# Patient Record
Sex: FEMALE | Race: BLACK OR AFRICAN AMERICAN | NOT HISPANIC OR LATINO
[De-identification: names, ages, dates, MRNs, and addresses within clinical notes are randomized per-mention and may not be internally consistent; named-entity substitution may affect disease eponyms.]

---

## 2017-01-03 ENCOUNTER — CLINICAL ADVICE (OUTPATIENT)
Age: 67
End: 2017-01-03

## 2017-04-20 ENCOUNTER — MEDICATION RENEWAL (OUTPATIENT)
Age: 67
End: 2017-04-20

## 2017-05-24 ENCOUNTER — APPOINTMENT (OUTPATIENT)
Dept: INTERNAL MEDICINE | Facility: CLINIC | Age: 67
End: 2017-05-24

## 2017-05-24 VITALS
SYSTOLIC BLOOD PRESSURE: 150 MMHG | DIASTOLIC BLOOD PRESSURE: 70 MMHG | OXYGEN SATURATION: 97 % | TEMPERATURE: 98.7 F | HEART RATE: 82 BPM | WEIGHT: 138 LBS | BODY MASS INDEX: 22.27 KG/M2

## 2017-06-19 ENCOUNTER — APPOINTMENT (OUTPATIENT)
Dept: INTERNAL MEDICINE | Facility: CLINIC | Age: 67
End: 2017-06-19

## 2017-06-19 VITALS
BODY MASS INDEX: 22.44 KG/M2 | WEIGHT: 139 LBS | SYSTOLIC BLOOD PRESSURE: 122 MMHG | DIASTOLIC BLOOD PRESSURE: 60 MMHG | TEMPERATURE: 99.5 F | OXYGEN SATURATION: 99 %

## 2017-06-20 LAB
ANION GAP SERPL CALC-SCNC: 14 MMOL/L
BUN SERPL-MCNC: 16 MG/DL
CALCIUM SERPL-MCNC: 9.7 MG/DL
CHLORIDE SERPL-SCNC: 105 MMOL/L
CO2 SERPL-SCNC: 23 MMOL/L
CREAT SERPL-MCNC: 1.12 MG/DL
GLUCOSE SERPL-MCNC: 97 MG/DL
POTASSIUM SERPL-SCNC: 4.9 MMOL/L
SODIUM SERPL-SCNC: 142 MMOL/L

## 2017-06-27 ENCOUNTER — OUTPATIENT (OUTPATIENT)
Dept: OUTPATIENT SERVICES | Facility: HOSPITAL | Age: 67
LOS: 1 days | End: 2017-06-27
Payer: MEDICARE

## 2017-06-27 DIAGNOSIS — D64.9 ANEMIA, UNSPECIFIED: ICD-10-CM

## 2017-06-27 DIAGNOSIS — Z51.89 ENCOUNTER FOR OTHER SPECIFIED AFTERCARE: ICD-10-CM

## 2017-06-27 PROCEDURE — 36430 TRANSFUSION BLD/BLD COMPNT: CPT

## 2017-06-27 PROCEDURE — 86923 COMPATIBILITY TEST ELECTRIC: CPT

## 2017-06-27 PROCEDURE — 86900 BLOOD TYPING SEROLOGIC ABO: CPT

## 2017-06-27 PROCEDURE — 36415 COLL VENOUS BLD VENIPUNCTURE: CPT

## 2017-06-27 PROCEDURE — 86901 BLOOD TYPING SEROLOGIC RH(D): CPT

## 2017-06-27 PROCEDURE — P9016: CPT

## 2017-06-27 PROCEDURE — 86850 RBC ANTIBODY SCREEN: CPT

## 2017-06-27 RX ORDER — ACETAMINOPHEN 500 MG
650 TABLET ORAL ONCE
Qty: 0 | Refills: 0 | Status: DISCONTINUED | OUTPATIENT
Start: 2017-06-27 | End: 2017-07-12

## 2017-06-27 RX ORDER — DIPHENHYDRAMINE HCL 50 MG
25 CAPSULE ORAL ONCE
Qty: 0 | Refills: 0 | Status: DISCONTINUED | OUTPATIENT
Start: 2017-06-27 | End: 2017-07-12

## 2017-08-21 ENCOUNTER — RX RENEWAL (OUTPATIENT)
Age: 67
End: 2017-08-21

## 2017-11-09 ENCOUNTER — APPOINTMENT (OUTPATIENT)
Dept: INTERNAL MEDICINE | Facility: CLINIC | Age: 67
End: 2017-11-09
Payer: MEDICARE

## 2017-11-09 VITALS
DIASTOLIC BLOOD PRESSURE: 71 MMHG | SYSTOLIC BLOOD PRESSURE: 134 MMHG | TEMPERATURE: 98.7 F | HEART RATE: 79 BPM | OXYGEN SATURATION: 99 % | WEIGHT: 134 LBS | BODY MASS INDEX: 21.63 KG/M2

## 2017-11-09 DIAGNOSIS — Z12.11 ENCOUNTER FOR SCREENING FOR MALIGNANT NEOPLASM OF COLON: ICD-10-CM

## 2017-11-09 PROCEDURE — 99213 OFFICE O/P EST LOW 20 MIN: CPT | Mod: GE

## 2017-12-11 ENCOUNTER — FORM ENCOUNTER (OUTPATIENT)
Age: 67
End: 2017-12-11

## 2017-12-12 ENCOUNTER — OUTPATIENT (OUTPATIENT)
Dept: OUTPATIENT SERVICES | Facility: HOSPITAL | Age: 67
LOS: 1 days | End: 2017-12-12
Payer: MEDICARE

## 2017-12-12 DIAGNOSIS — R01.1 CARDIAC MURMUR, UNSPECIFIED: ICD-10-CM

## 2017-12-12 PROCEDURE — 93306 TTE W/DOPPLER COMPLETE: CPT

## 2017-12-12 PROCEDURE — 93306 TTE W/DOPPLER COMPLETE: CPT | Mod: 26

## 2018-01-04 ENCOUNTER — RX RENEWAL (OUTPATIENT)
Age: 68
End: 2018-01-04

## 2018-02-15 ENCOUNTER — RX RENEWAL (OUTPATIENT)
Age: 68
End: 2018-02-15

## 2018-03-05 ENCOUNTER — RX RENEWAL (OUTPATIENT)
Age: 68
End: 2018-03-05

## 2018-03-07 ENCOUNTER — RX RENEWAL (OUTPATIENT)
Age: 68
End: 2018-03-07

## 2018-04-02 ENCOUNTER — RX RENEWAL (OUTPATIENT)
Age: 68
End: 2018-04-02

## 2018-04-02 RX ORDER — AMLODIPINE BESYLATE AND BENAZEPRIL HYDROCHLORIDE 10; 20 MG/1; MG/1
10-20 CAPSULE ORAL
Qty: 30 | Refills: 0 | Status: DISCONTINUED | COMMUNITY
Start: 2017-05-24 | End: 2018-04-02

## 2018-04-09 ENCOUNTER — MEDICATION RENEWAL (OUTPATIENT)
Age: 68
End: 2018-04-09

## 2018-05-01 ENCOUNTER — RX RENEWAL (OUTPATIENT)
Age: 68
End: 2018-05-01

## 2018-09-19 ENCOUNTER — APPOINTMENT (OUTPATIENT)
Dept: INTERNAL MEDICINE | Facility: CLINIC | Age: 68
End: 2018-09-19
Payer: MEDICARE

## 2018-09-19 VITALS
WEIGHT: 135 LBS | HEIGHT: 66 IN | OXYGEN SATURATION: 100 % | DIASTOLIC BLOOD PRESSURE: 74 MMHG | HEART RATE: 77 BPM | SYSTOLIC BLOOD PRESSURE: 155 MMHG | TEMPERATURE: 98.3 F | BODY MASS INDEX: 21.69 KG/M2

## 2018-09-19 DIAGNOSIS — Z23 ENCOUNTER FOR IMMUNIZATION: ICD-10-CM

## 2018-09-19 PROCEDURE — 36415 COLL VENOUS BLD VENIPUNCTURE: CPT

## 2018-09-19 PROCEDURE — 99214 OFFICE O/P EST MOD 30 MIN: CPT | Mod: 25,GC

## 2018-09-21 ENCOUNTER — RESULT REVIEW (OUTPATIENT)
Age: 68
End: 2018-09-21

## 2018-09-21 LAB
ANION GAP SERPL CALC-SCNC: 11 MMOL/L
BUN SERPL-MCNC: 21 MG/DL
CALCIUM SERPL-MCNC: 9.5 MG/DL
CHLORIDE SERPL-SCNC: 105 MMOL/L
CO2 SERPL-SCNC: 24 MMOL/L
CREAT SERPL-MCNC: 1.03 MG/DL
CREAT SPEC-SCNC: 87 MG/DL
GLUCOSE SERPL-MCNC: 81 MG/DL
MICROALBUMIN 24H UR DL<=1MG/L-MCNC: 5.8 MG/DL
MICROALBUMIN/CREAT 24H UR-RTO: 66 MG/G
POTASSIUM SERPL-SCNC: 5.1 MMOL/L
SODIUM SERPL-SCNC: 140 MMOL/L

## 2018-11-06 ENCOUNTER — FORM ENCOUNTER (OUTPATIENT)
Age: 68
End: 2018-11-06

## 2018-11-07 ENCOUNTER — OUTPATIENT (OUTPATIENT)
Dept: OUTPATIENT SERVICES | Facility: HOSPITAL | Age: 68
LOS: 1 days | End: 2018-11-07
Payer: MEDICARE

## 2018-11-07 PROCEDURE — 71048 X-RAY EXAM CHEST 4+ VIEWS: CPT

## 2018-11-07 PROCEDURE — 71048 X-RAY EXAM CHEST 4+ VIEWS: CPT | Mod: 26

## 2018-11-12 ENCOUNTER — OUTPATIENT (OUTPATIENT)
Dept: OUTPATIENT SERVICES | Facility: HOSPITAL | Age: 68
LOS: 1 days | End: 2018-11-12
Payer: MEDICARE

## 2018-11-12 ENCOUNTER — TRANSCRIPTION ENCOUNTER (OUTPATIENT)
Age: 68
End: 2018-11-12

## 2018-11-12 ENCOUNTER — APPOINTMENT (OUTPATIENT)
Dept: RADIOLOGY | Facility: CLINIC | Age: 68
End: 2018-11-12
Payer: MEDICARE

## 2018-11-12 PROCEDURE — 77080 DXA BONE DENSITY AXIAL: CPT | Mod: 26

## 2018-11-12 PROCEDURE — 77080 DXA BONE DENSITY AXIAL: CPT

## 2018-11-15 ENCOUNTER — RESULT REVIEW (OUTPATIENT)
Age: 68
End: 2018-11-15

## 2018-11-19 ENCOUNTER — RX CHANGE (OUTPATIENT)
Age: 68
End: 2018-11-19

## 2018-11-19 RX ORDER — BENAZEPRIL HYDROCHLORIDE 20 MG/1
20 TABLET, FILM COATED ORAL DAILY
Qty: 90 | Refills: 1 | Status: DISCONTINUED | COMMUNITY
Start: 2018-04-02 | End: 2018-11-19

## 2018-11-20 ENCOUNTER — RX RENEWAL (OUTPATIENT)
Age: 68
End: 2018-11-20

## 2018-12-17 ENCOUNTER — LABORATORY RESULT (OUTPATIENT)
Age: 68
End: 2018-12-17

## 2018-12-17 ENCOUNTER — APPOINTMENT (OUTPATIENT)
Dept: INTERNAL MEDICINE | Facility: CLINIC | Age: 68
End: 2018-12-17
Payer: MEDICARE

## 2018-12-17 VITALS
WEIGHT: 136 LBS | OXYGEN SATURATION: 99 % | HEIGHT: 66 IN | HEART RATE: 88 BPM | DIASTOLIC BLOOD PRESSURE: 68 MMHG | TEMPERATURE: 97.6 F | SYSTOLIC BLOOD PRESSURE: 140 MMHG | BODY MASS INDEX: 21.86 KG/M2

## 2018-12-17 PROCEDURE — 36415 COLL VENOUS BLD VENIPUNCTURE: CPT

## 2018-12-17 PROCEDURE — G0439: CPT | Mod: GC

## 2018-12-21 ENCOUNTER — RESULT REVIEW (OUTPATIENT)
Age: 68
End: 2018-12-21

## 2019-01-24 ENCOUNTER — RX RENEWAL (OUTPATIENT)
Age: 69
End: 2019-01-24

## 2019-01-29 ENCOUNTER — RX RENEWAL (OUTPATIENT)
Age: 69
End: 2019-01-29

## 2019-02-04 ENCOUNTER — FORM ENCOUNTER (OUTPATIENT)
Age: 69
End: 2019-02-04

## 2019-02-05 ENCOUNTER — OUTPATIENT (OUTPATIENT)
Dept: OUTPATIENT SERVICES | Facility: HOSPITAL | Age: 69
LOS: 1 days | End: 2019-02-05
Payer: MEDICARE

## 2019-02-05 DIAGNOSIS — I31.3 PERICARDIAL EFFUSION (NONINFLAMMATORY): ICD-10-CM

## 2019-02-05 PROCEDURE — 93306 TTE W/DOPPLER COMPLETE: CPT

## 2019-02-05 PROCEDURE — 93306 TTE W/DOPPLER COMPLETE: CPT | Mod: 26

## 2019-02-11 ENCOUNTER — RESULT REVIEW (OUTPATIENT)
Age: 69
End: 2019-02-11

## 2019-02-11 ENCOUNTER — TRANSCRIPTION ENCOUNTER (OUTPATIENT)
Age: 69
End: 2019-02-11

## 2019-02-27 ENCOUNTER — RX RENEWAL (OUTPATIENT)
Age: 69
End: 2019-02-27

## 2019-03-06 ENCOUNTER — TRANSCRIPTION ENCOUNTER (OUTPATIENT)
Age: 69
End: 2019-03-06

## 2019-03-22 ENCOUNTER — APPOINTMENT (OUTPATIENT)
Dept: CARDIOLOGY | Facility: CLINIC | Age: 69
End: 2019-03-22

## 2019-03-26 ENCOUNTER — RX RENEWAL (OUTPATIENT)
Age: 69
End: 2019-03-26

## 2019-03-27 ENCOUNTER — RX RENEWAL (OUTPATIENT)
Age: 69
End: 2019-03-27

## 2019-03-29 ENCOUNTER — APPOINTMENT (OUTPATIENT)
Dept: INTERNAL MEDICINE | Facility: CLINIC | Age: 69
End: 2019-03-29
Payer: MEDICARE

## 2019-03-29 VITALS
HEART RATE: 84 BPM | WEIGHT: 140 LBS | OXYGEN SATURATION: 98 % | SYSTOLIC BLOOD PRESSURE: 150 MMHG | DIASTOLIC BLOOD PRESSURE: 69 MMHG | BODY MASS INDEX: 22.5 KG/M2 | HEIGHT: 66 IN | TEMPERATURE: 97.6 F

## 2019-03-29 DIAGNOSIS — T75.3XXA MOTION SICKNESS, INITIAL ENCOUNTER: ICD-10-CM

## 2019-03-29 PROCEDURE — 36415 COLL VENOUS BLD VENIPUNCTURE: CPT

## 2019-03-29 PROCEDURE — 90732 PPSV23 VACC 2 YRS+ SUBQ/IM: CPT

## 2019-03-29 PROCEDURE — G0009: CPT

## 2019-03-29 PROCEDURE — 99214 OFFICE O/P EST MOD 30 MIN: CPT | Mod: GC,25

## 2019-04-01 LAB
ANION GAP SERPL CALC-SCNC: 11 MMOL/L
BUN SERPL-MCNC: 21 MG/DL
CALCIUM SERPL-MCNC: 9.5 MG/DL
CHLORIDE SERPL-SCNC: 105 MMOL/L
CO2 SERPL-SCNC: 25 MMOL/L
CREAT SERPL-MCNC: 1.05 MG/DL
GLUCOSE SERPL-MCNC: 87 MG/DL
POTASSIUM SERPL-SCNC: 4.9 MMOL/L
SODIUM SERPL-SCNC: 141 MMOL/L

## 2019-04-01 RX ORDER — LOSARTAN POTASSIUM 50 MG/1
50 TABLET, FILM COATED ORAL
Qty: 90 | Refills: 3 | Status: DISCONTINUED | COMMUNITY
Start: 2018-11-19 | End: 2019-04-01

## 2019-04-23 ENCOUNTER — APPOINTMENT (OUTPATIENT)
Dept: INTERNAL MEDICINE | Facility: CLINIC | Age: 69
End: 2019-04-23
Payer: MEDICARE

## 2019-04-23 VITALS
DIASTOLIC BLOOD PRESSURE: 65 MMHG | HEART RATE: 80 BPM | SYSTOLIC BLOOD PRESSURE: 139 MMHG | WEIGHT: 138.25 LBS | TEMPERATURE: 98.2 F | BODY MASS INDEX: 22.22 KG/M2 | HEIGHT: 66 IN | OXYGEN SATURATION: 99 %

## 2019-04-23 PROCEDURE — 99213 OFFICE O/P EST LOW 20 MIN: CPT | Mod: GC

## 2019-05-03 ENCOUNTER — RX RENEWAL (OUTPATIENT)
Age: 69
End: 2019-05-03

## 2019-07-02 ENCOUNTER — RX RENEWAL (OUTPATIENT)
Age: 69
End: 2019-07-02

## 2019-08-26 ENCOUNTER — RX RENEWAL (OUTPATIENT)
Age: 69
End: 2019-08-26

## 2019-10-03 ENCOUNTER — LABORATORY RESULT (OUTPATIENT)
Age: 69
End: 2019-10-03

## 2019-10-03 ENCOUNTER — RX RENEWAL (OUTPATIENT)
Age: 69
End: 2019-10-03

## 2019-10-03 ENCOUNTER — APPOINTMENT (OUTPATIENT)
Dept: INTERNAL MEDICINE | Facility: CLINIC | Age: 69
End: 2019-10-03
Payer: MEDICARE

## 2019-10-03 ENCOUNTER — MED ADMIN CHARGE (OUTPATIENT)
Age: 69
End: 2019-10-03

## 2019-10-03 VITALS — DIASTOLIC BLOOD PRESSURE: 62 MMHG | SYSTOLIC BLOOD PRESSURE: 130 MMHG

## 2019-10-03 VITALS — DIASTOLIC BLOOD PRESSURE: 62 MMHG | SYSTOLIC BLOOD PRESSURE: 132 MMHG

## 2019-10-03 VITALS
WEIGHT: 144 LBS | BODY MASS INDEX: 23.14 KG/M2 | SYSTOLIC BLOOD PRESSURE: 150 MMHG | DIASTOLIC BLOOD PRESSURE: 67 MMHG | OXYGEN SATURATION: 99 % | HEART RATE: 83 BPM | TEMPERATURE: 98 F | HEIGHT: 66 IN

## 2019-10-03 DIAGNOSIS — Z12.39 ENCOUNTER FOR OTHER SCREENING FOR MALIGNANT NEOPLASM OF BREAST: ICD-10-CM

## 2019-10-03 DIAGNOSIS — Z12.31 ENCOUNTER FOR SCREENING MAMMOGRAM FOR MALIGNANT NEOPLASM OF BREAST: ICD-10-CM

## 2019-10-03 DIAGNOSIS — Z23 ENCOUNTER FOR IMMUNIZATION: ICD-10-CM

## 2019-10-03 DIAGNOSIS — I10 ESSENTIAL (PRIMARY) HYPERTENSION: ICD-10-CM

## 2019-10-03 PROCEDURE — 90662 IIV NO PRSV INCREASED AG IM: CPT

## 2019-10-03 PROCEDURE — 99214 OFFICE O/P EST MOD 30 MIN: CPT | Mod: 25,GC

## 2019-10-03 PROCEDURE — 36415 COLL VENOUS BLD VENIPUNCTURE: CPT

## 2019-10-03 PROCEDURE — G0008: CPT

## 2019-10-03 RX ORDER — SCOPALAMINE 1 MG/3D
1 PATCH, EXTENDED RELEASE TRANSDERMAL
Qty: 4 | Refills: 2 | Status: DISCONTINUED | COMMUNITY
Start: 2017-05-24 | End: 2019-10-03

## 2019-10-14 LAB
25(OH)D3 SERPL-MCNC: 13.1 NG/ML
ALBUMIN MFR SERPL ELPH: 63.6 %
ALBUMIN SERPL ELPH-MCNC: 4.8 G/DL
ALBUMIN SERPL-MCNC: 4.7 G/DL
ALBUMIN/GLOB SERPL: 1.7 RATIO
ALP BLD-CCNC: 94 U/L
ALPHA1 GLOB MFR SERPL ELPH: 4.1 %
ALPHA1 GLOB SERPL ELPH-MCNC: 0.3 G/DL
ALPHA2 GLOB MFR SERPL ELPH: 6.9 %
ALPHA2 GLOB SERPL ELPH-MCNC: 0.5 G/DL
ALT SERPL-CCNC: 21 U/L
ANION GAP SERPL CALC-SCNC: 15 MMOL/L
AST SERPL-CCNC: 22 U/L
B-GLOBULIN MFR SERPL ELPH: 9.4 %
B-GLOBULIN SERPL ELPH-MCNC: 0.7 G/DL
BASOPHILS # BLD AUTO: 0.45 K/UL
BASOPHILS NFR BLD AUTO: 4.2 %
BILIRUB SERPL-MCNC: 0.4 MG/DL
BUN SERPL-MCNC: 21 MG/DL
CALCIUM SERPL-MCNC: 9.7 MG/DL
CHLORIDE SERPL-SCNC: 109 MMOL/L
CHOLEST SERPL-MCNC: 83 MG/DL
CHOLEST/HDLC SERPL: 3.6 RATIO
CO2 SERPL-SCNC: 21 MMOL/L
CREAT SERPL-MCNC: 1.24 MG/DL
EOSINOPHIL # BLD AUTO: 0 K/UL
EOSINOPHIL NFR BLD AUTO: 0 %
GAMMA GLOB FLD ELPH-MCNC: 1.2 G/DL
GAMMA GLOB MFR SERPL ELPH: 16 %
GLUCOSE SERPL-MCNC: 81 MG/DL
HCT VFR BLD CALC: 29 %
HDLC SERPL-MCNC: 23 MG/DL
HGB BLD-MCNC: 8.1 G/DL
IGA 24H UR QL IFE: NORMAL
INTERPRETATION SERPL IEP-IMP: NORMAL
LDLC SERPL CALC-MCNC: 28 MG/DL
LYMPHOCYTES # BLD AUTO: 2.02 K/UL
LYMPHOCYTES NFR BLD AUTO: 19 %
M PROTEIN SPEC IFE-MCNC: NORMAL
MAGNESIUM SERPL-MCNC: 2 MG/DL
MAN DIFF?: NORMAL
MCHC RBC-ENTMCNC: 27.9 GM/DL
MCHC RBC-ENTMCNC: 30.2 PG
MCV RBC AUTO: 108.2 FL
MONOCYTES # BLD AUTO: 0.7 K/UL
MONOCYTES NFR BLD AUTO: 6.6 %
NEUTROPHILS # BLD AUTO: 6.6 K/UL
NEUTROPHILS NFR BLD AUTO: 57 %
PHOSPHATE SERPL-MCNC: 3.5 MG/DL
PLATELET # BLD AUTO: 472 K/UL
POTASSIUM SERPL-SCNC: 4.8 MMOL/L
PROT SERPL-MCNC: 7.2 G/DL
PROT SERPL-MCNC: 7.4 G/DL
PROT SERPL-MCNC: 7.4 G/DL
RBC # BLD: 2.68 M/UL
RBC # FLD: 19.1 %
SODIUM SERPL-SCNC: 145 MMOL/L
TRIGL SERPL-MCNC: 161 MG/DL
WBC # FLD AUTO: 10.65 K/UL

## 2019-12-19 ENCOUNTER — RX RENEWAL (OUTPATIENT)
Age: 69
End: 2019-12-19

## 2019-12-19 ENCOUNTER — APPOINTMENT (OUTPATIENT)
Dept: INTERNAL MEDICINE | Facility: CLINIC | Age: 69
End: 2019-12-19
Payer: MEDICARE

## 2019-12-19 VITALS
BODY MASS INDEX: 22.34 KG/M2 | WEIGHT: 139 LBS | OXYGEN SATURATION: 100 % | TEMPERATURE: 97.8 F | HEART RATE: 95 BPM | DIASTOLIC BLOOD PRESSURE: 63 MMHG | SYSTOLIC BLOOD PRESSURE: 146 MMHG | HEIGHT: 66 IN

## 2019-12-19 VITALS — DIASTOLIC BLOOD PRESSURE: 61 MMHG | SYSTOLIC BLOOD PRESSURE: 134 MMHG

## 2019-12-19 PROCEDURE — G0444 DEPRESSION SCREEN ANNUAL: CPT | Mod: 59

## 2019-12-19 PROCEDURE — 99213 OFFICE O/P EST LOW 20 MIN: CPT | Mod: 25,GC

## 2019-12-19 PROCEDURE — G0439: CPT

## 2019-12-20 LAB
CREAT SPEC-SCNC: 79 MG/DL
MICROALBUMIN 24H UR DL<=1MG/L-MCNC: 8.7 MG/DL
MICROALBUMIN/CREAT 24H UR-RTO: 110 MG/G

## 2019-12-23 ENCOUNTER — OUTPATIENT (OUTPATIENT)
Dept: OUTPATIENT SERVICES | Facility: HOSPITAL | Age: 69
LOS: 1 days | End: 2019-12-23
Payer: MEDICARE

## 2019-12-23 ENCOUNTER — APPOINTMENT (OUTPATIENT)
Age: 69
End: 2019-12-23

## 2019-12-23 VITALS
RESPIRATION RATE: 18 BRPM | TEMPERATURE: 98 F | SYSTOLIC BLOOD PRESSURE: 136 MMHG | HEART RATE: 87 BPM | OXYGEN SATURATION: 98 % | DIASTOLIC BLOOD PRESSURE: 74 MMHG

## 2019-12-23 VITALS
OXYGEN SATURATION: 98 % | TEMPERATURE: 98 F | RESPIRATION RATE: 18 BRPM | DIASTOLIC BLOOD PRESSURE: 70 MMHG | HEART RATE: 83 BPM | SYSTOLIC BLOOD PRESSURE: 137 MMHG

## 2019-12-23 DIAGNOSIS — D64.9 ANEMIA, UNSPECIFIED: ICD-10-CM

## 2019-12-23 PROCEDURE — 86850 RBC ANTIBODY SCREEN: CPT

## 2019-12-23 PROCEDURE — 86901 BLOOD TYPING SEROLOGIC RH(D): CPT

## 2019-12-23 PROCEDURE — 86923 COMPATIBILITY TEST ELECTRIC: CPT

## 2019-12-23 PROCEDURE — 36415 COLL VENOUS BLD VENIPUNCTURE: CPT

## 2019-12-23 PROCEDURE — 86900 BLOOD TYPING SEROLOGIC ABO: CPT

## 2019-12-23 PROCEDURE — 36430 TRANSFUSION BLD/BLD COMPNT: CPT

## 2019-12-23 PROCEDURE — P9016: CPT

## 2020-01-09 ENCOUNTER — FORM ENCOUNTER (OUTPATIENT)
Age: 70
End: 2020-01-09

## 2020-01-10 ENCOUNTER — OUTPATIENT (OUTPATIENT)
Dept: OUTPATIENT SERVICES | Facility: HOSPITAL | Age: 70
LOS: 1 days | End: 2020-01-10
Payer: MEDICARE

## 2020-01-10 DIAGNOSIS — I31.3 PERICARDIAL EFFUSION (NONINFLAMMATORY): ICD-10-CM

## 2020-01-10 DIAGNOSIS — D75.81 MYELOFIBROSIS: ICD-10-CM

## 2020-01-10 PROCEDURE — 93306 TTE W/DOPPLER COMPLETE: CPT

## 2020-01-10 PROCEDURE — 93306 TTE W/DOPPLER COMPLETE: CPT | Mod: 26

## 2020-03-23 ENCOUNTER — RX RENEWAL (OUTPATIENT)
Age: 70
End: 2020-03-23

## 2020-04-03 ENCOUNTER — RX RENEWAL (OUTPATIENT)
Age: 70
End: 2020-04-03

## 2020-04-09 ENCOUNTER — APPOINTMENT (OUTPATIENT)
Dept: NEPHROLOGY | Facility: CLINIC | Age: 70
End: 2020-04-09

## 2020-06-24 ENCOUNTER — LABORATORY RESULT (OUTPATIENT)
Age: 70
End: 2020-06-24

## 2020-06-24 ENCOUNTER — APPOINTMENT (OUTPATIENT)
Dept: NEPHROLOGY | Facility: CLINIC | Age: 70
End: 2020-06-24
Payer: MEDICARE

## 2020-06-24 VITALS
WEIGHT: 139 LBS | HEART RATE: 76 BPM | SYSTOLIC BLOOD PRESSURE: 148 MMHG | DIASTOLIC BLOOD PRESSURE: 67 MMHG | HEIGHT: 66 IN | BODY MASS INDEX: 22.34 KG/M2

## 2020-06-24 PROCEDURE — 99205 OFFICE O/P NEW HI 60 MIN: CPT

## 2020-06-24 NOTE — PHYSICAL EXAM
[General Appearance - Alert] : alert [General Appearance - In No Acute Distress] : in no acute distress [Sclera] : the sclera and conjunctiva were normal [PERRL With Normal Accommodation] : pupils were equal in size, round, and reactive to light [Extraocular Movements] : extraocular movements were intact [Outer Ear] : the ears and nose were normal in appearance [Oropharynx] : the oropharynx was normal [Neck Cervical Mass (___cm)] : no neck mass was observed [Neck Appearance] : the appearance of the neck was normal [Jugular Venous Distention Increased] : there was no jugular-venous distention [Auscultation Breath Sounds / Voice Sounds] : lungs were clear to auscultation bilaterally [Heart Rate And Rhythm] : heart rate was normal and rhythm regular [Heart Sounds] : normal S1 and S2 [Heart Sounds Gallop] : no gallops [Murmurs] : no murmurs [Heart Sounds Pericardial Friction Rub] : no pericardial rub [Edema] : there was no peripheral edema [Full Pulse] : the pedal pulses are present [No CVA Tenderness] : no ~M costovertebral angle tenderness [No Spinal Tenderness] : no spinal tenderness [Skin Color & Pigmentation] : normal skin color and pigmentation [] : no rash [Skin Turgor] : normal skin turgor [Deep Tendon Reflexes (DTR)] : deep tendon reflexes were 2+ and symmetric [Sensation] : the sensory exam was normal to light touch and pinprick [No Focal Deficits] : no focal deficits [Oriented To Time, Place, And Person] : oriented to person, place, and time [Impaired Insight] : insight and judgment were intact [Affect] : the affect was normal [FreeTextEntry1] : left eye abnormal
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
## 2020-06-24 NOTE — CONSULT LETTER
[Consult Letter:] : I had the pleasure of evaluating your patient, [unfilled]. [Dear  ___] : Dear  [unfilled], [Please see my note below.] : Please see my note below. [Consult Closing:] : Thank you very much for allowing me to participate in the care of this patient.  If you have any questions, please do not hesitate to contact me. [Sincerely,] : Sincerely, [FreeTextEntry2] : Jf/ Enio [FreeTextEntry1] : Thanks very much - she is a remarkable and resilient individual!  Best regards, Gregorio Mcknight [FreeTextEntry3] : Sincerely, \par \par Nahum Mcknight MD, FACP

## 2020-06-25 LAB
ANA SER IF-ACNC: NEGATIVE
ANION GAP SERPL CALC-SCNC: 13 MMOL/L
APPEARANCE: CLEAR
BACTERIA: NEGATIVE
BILIRUBIN URINE: NEGATIVE
BLOOD URINE: NEGATIVE
BUN SERPL-MCNC: 30 MG/DL
C3 SERPL-MCNC: 98 MG/DL
C4 SERPL-MCNC: 31 MG/DL
CALCIUM SERPL-MCNC: 9.5 MG/DL
CALCIUM SERPL-MCNC: 9.5 MG/DL
CHLORIDE SERPL-SCNC: 106 MMOL/L
CO2 SERPL-SCNC: 23 MMOL/L
COLOR: NORMAL
CREAT SERPL-MCNC: 1.29 MG/DL
CREAT SPEC-SCNC: 82 MG/DL
DEPRECATED KAPPA LC FREE/LAMBDA SER: 1.04 RATIO
GLUCOSE QUALITATIVE U: NEGATIVE
GLUCOSE SERPL-MCNC: 89 MG/DL
HBV SURFACE AG SER QL: NONREACTIVE
HCV AB SER QL: ABNORMAL
HCV S/CO RATIO: 1.05 S/CO
HYALINE CASTS: 1 /LPF
KAPPA LC CSF-MCNC: 3.11 MG/DL
KAPPA LC SERPL-MCNC: 3.23 MG/DL
KETONES URINE: NEGATIVE
LEUKOCYTE ESTERASE URINE: NEGATIVE
MICROALBUMIN 24H UR DL<=1MG/L-MCNC: 6.2 MG/DL
MICROALBUMIN/CREAT 24H UR-RTO: 75 MG/G
MICROSCOPIC-UA: NORMAL
NITRITE URINE: NEGATIVE
PARATHYROID HORMONE INTACT: 107 PG/ML
PH URINE: 6
PHOSPHATE SERPL-MCNC: 4.5 MG/DL
POTASSIUM SERPL-SCNC: 5 MMOL/L
PROTEIN URINE: NORMAL
RED BLOOD CELLS URINE: 1 /HPF
SODIUM SERPL-SCNC: 143 MMOL/L
SPECIFIC GRAVITY URINE: 1.02
SQUAMOUS EPITHELIAL CELLS: 0 /HPF
UROBILINOGEN URINE: NORMAL
WHITE BLOOD CELLS URINE: 1 /HPF

## 2020-06-29 LAB — CRYOGLOB SERPL-MCNC: NEGATIVE

## 2020-06-30 ENCOUNTER — RX RENEWAL (OUTPATIENT)
Age: 70
End: 2020-06-30

## 2020-06-30 LAB
PHOSPHOLIPASE A2 RECEPTOR ELISA: <2 RU/ML
PHOSPHOLIPASE A2 RECEPTOR IFA: NEGATIVE

## 2020-07-07 ENCOUNTER — RX RENEWAL (OUTPATIENT)
Age: 70
End: 2020-07-07

## 2020-07-13 ENCOUNTER — APPOINTMENT (OUTPATIENT)
Dept: INTERNAL MEDICINE | Facility: CLINIC | Age: 70
End: 2020-07-13
Payer: MEDICARE

## 2020-07-13 VITALS
DIASTOLIC BLOOD PRESSURE: 70 MMHG | BODY MASS INDEX: 22.98 KG/M2 | TEMPERATURE: 98 F | OXYGEN SATURATION: 100 % | SYSTOLIC BLOOD PRESSURE: 129 MMHG | WEIGHT: 143 LBS | HEART RATE: 73 BPM | HEIGHT: 66 IN

## 2020-07-13 DIAGNOSIS — L60.8 OTHER NAIL DISORDERS: ICD-10-CM

## 2020-07-13 DIAGNOSIS — H40.059 OCULAR HYPERTENSION, UNSPECIFIED EYE: ICD-10-CM

## 2020-07-13 PROCEDURE — 99214 OFFICE O/P EST MOD 30 MIN: CPT

## 2020-07-13 RX ORDER — ATORVASTATIN CALCIUM 40 MG/1
40 TABLET, FILM COATED ORAL
Qty: 90 | Refills: 0 | Status: DISCONTINUED | COMMUNITY
Start: 2020-03-23 | End: 2020-07-13

## 2020-07-14 ENCOUNTER — NON-APPOINTMENT (OUTPATIENT)
Age: 70
End: 2020-07-14

## 2020-07-14 ENCOUNTER — APPOINTMENT (OUTPATIENT)
Dept: HEART AND VASCULAR | Facility: CLINIC | Age: 70
End: 2020-07-14
Payer: MEDICARE

## 2020-07-14 ENCOUNTER — OUTPATIENT (OUTPATIENT)
Dept: OUTPATIENT SERVICES | Facility: HOSPITAL | Age: 70
LOS: 1 days | End: 2020-07-14
Payer: MEDICARE

## 2020-07-14 ENCOUNTER — APPOINTMENT (OUTPATIENT)
Dept: ULTRASOUND IMAGING | Facility: HOSPITAL | Age: 70
End: 2020-07-14
Payer: MEDICARE

## 2020-07-14 VITALS
SYSTOLIC BLOOD PRESSURE: 136 MMHG | HEART RATE: 81 BPM | WEIGHT: 145 LBS | HEIGHT: 66 IN | BODY MASS INDEX: 23.3 KG/M2 | DIASTOLIC BLOOD PRESSURE: 68 MMHG

## 2020-07-14 VITALS — TEMPERATURE: 99.3 F

## 2020-07-14 PROCEDURE — 76770 US EXAM ABDO BACK WALL COMP: CPT

## 2020-07-14 PROCEDURE — 93000 ELECTROCARDIOGRAM COMPLETE: CPT

## 2020-07-14 PROCEDURE — 76770 US EXAM ABDO BACK WALL COMP: CPT | Mod: 26

## 2020-07-14 PROCEDURE — 99203 OFFICE O/P NEW LOW 30 MIN: CPT | Mod: 25
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
## 2020-07-14 NOTE — PHYSICAL EXAM
[General Appearance - Well Developed] : well developed [Well Groomed] : well groomed [Normal Appearance] : normal appearance [General Appearance - Well Nourished] : well nourished [No Deformities] : no deformities [General Appearance - In No Acute Distress] : no acute distress [Normal Oral Mucosa] : normal oral mucosa [No Oral Pallor] : no oral pallor [Normal Jugular Venous A Waves Present] : normal jugular venous A waves present [No Oral Cyanosis] : no oral cyanosis [Normal Jugular Venous V Waves Present] : normal jugular venous V waves present [No Jugular Venous Vanessa A Waves] : no jugular venous vanessa A waves [Heart Rate And Rhythm] : heart rate and rhythm were normal [Heart Sounds] : normal S1 and S2 [Edema] : no peripheral edema present [Arterial Pulses Normal] : the arterial pulses were normal [Systolic grade ___/6] : A grade [unfilled]/6 systolic murmur was heard. [Respiration, Rhythm And Depth] : normal respiratory rhythm and effort [Exaggerated Use Of Accessory Muscles For Inspiration] : no accessory muscle use [Auscultation Breath Sounds / Voice Sounds] : lungs were clear to auscultation bilaterally [Nail Clubbing] : no clubbing of the fingernails [Cyanosis, Localized] : no localized cyanosis [Petechial Hemorrhages (___cm)] : no petechial hemorrhages [] : no ischemic changes

## 2020-12-15 PROBLEM — Z12.11 ENCOUNTER FOR SCREENING COLONOSCOPY: Status: RESOLVED | Noted: 2017-11-09 | Resolved: 2020-12-15

## 2020-12-21 ENCOUNTER — APPOINTMENT (OUTPATIENT)
Dept: INTERNAL MEDICINE | Facility: CLINIC | Age: 70
End: 2020-12-21
Payer: MEDICARE

## 2020-12-21 VITALS
HEART RATE: 77 BPM | OXYGEN SATURATION: 99 % | BODY MASS INDEX: 23.89 KG/M2 | TEMPERATURE: 99.1 F | SYSTOLIC BLOOD PRESSURE: 147 MMHG | DIASTOLIC BLOOD PRESSURE: 74 MMHG | WEIGHT: 148 LBS

## 2020-12-21 VITALS — SYSTOLIC BLOOD PRESSURE: 138 MMHG | DIASTOLIC BLOOD PRESSURE: 70 MMHG

## 2020-12-21 DIAGNOSIS — I31.3 PERICARDIAL EFFUSION (NONINFLAMMATORY): ICD-10-CM

## 2020-12-21 DIAGNOSIS — Z12.4 ENCOUNTER FOR SCREENING FOR MALIGNANT NEOPLASM OF CERVIX: ICD-10-CM

## 2020-12-21 DIAGNOSIS — B35.1 TINEA UNGUIUM: ICD-10-CM

## 2020-12-21 PROCEDURE — G0439: CPT | Mod: GC

## 2020-12-22 ENCOUNTER — RX RENEWAL (OUTPATIENT)
Age: 70
End: 2020-12-22

## 2020-12-22 RX ORDER — LATANOPROST/PF 0.005 %
0.01 DROPS OPHTHALMIC (EYE)
Qty: 10 | Refills: 0 | Status: ACTIVE | COMMUNITY
Start: 2019-12-19 | End: 1900-01-01

## 2021-01-05 ENCOUNTER — APPOINTMENT (OUTPATIENT)
Dept: HEART AND VASCULAR | Facility: CLINIC | Age: 71
End: 2021-01-05
Payer: MEDICARE

## 2021-01-05 ENCOUNTER — NON-APPOINTMENT (OUTPATIENT)
Age: 71
End: 2021-01-05

## 2021-01-05 VITALS
BODY MASS INDEX: 23.46 KG/M2 | SYSTOLIC BLOOD PRESSURE: 160 MMHG | DIASTOLIC BLOOD PRESSURE: 82 MMHG | HEIGHT: 66 IN | TEMPERATURE: 97.6 F | HEART RATE: 69 BPM | WEIGHT: 146 LBS

## 2021-01-05 VITALS — SYSTOLIC BLOOD PRESSURE: 152 MMHG | DIASTOLIC BLOOD PRESSURE: 72 MMHG

## 2021-01-05 PROCEDURE — 93000 ELECTROCARDIOGRAM COMPLETE: CPT

## 2021-01-05 PROCEDURE — 93306 TTE W/DOPPLER COMPLETE: CPT

## 2021-01-05 PROCEDURE — 99214 OFFICE O/P EST MOD 30 MIN: CPT | Mod: 25
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
## 2021-01-05 NOTE — PHYSICAL EXAM
[General Appearance - Well Developed] : well developed [Normal Appearance] : normal appearance [Well Groomed] : well groomed [General Appearance - Well Nourished] : well nourished [No Deformities] : no deformities [General Appearance - In No Acute Distress] : no acute distress [Normal Oral Mucosa] : normal oral mucosa [No Oral Pallor] : no oral pallor [No Oral Cyanosis] : no oral cyanosis [Normal Jugular Venous A Waves Present] : normal jugular venous A waves present [Normal Jugular Venous V Waves Present] : normal jugular venous V waves present [No Jugular Venous Vanessa A Waves] : no jugular venous vanessa A waves [Respiration, Rhythm And Depth] : normal respiratory rhythm and effort [Exaggerated Use Of Accessory Muscles For Inspiration] : no accessory muscle use [Auscultation Breath Sounds / Voice Sounds] : lungs were clear to auscultation bilaterally [Heart Rate And Rhythm] : heart rate and rhythm were normal [Heart Sounds] : normal S1 and S2 [Systolic grade ___/6] : A grade [unfilled]/6 systolic murmur was heard. [Nail Clubbing] : no clubbing of the fingernails [Cyanosis, Localized] : no localized cyanosis [Petechial Hemorrhages (___cm)] : no petechial hemorrhages [] : no ischemic changes

## 2021-01-07 ENCOUNTER — APPOINTMENT (OUTPATIENT)
Dept: NEPHROLOGY | Facility: CLINIC | Age: 71
End: 2021-01-07
Payer: MEDICARE

## 2021-01-07 VITALS
WEIGHT: 146 LBS | HEIGHT: 66 IN | BODY MASS INDEX: 23.46 KG/M2 | SYSTOLIC BLOOD PRESSURE: 156 MMHG | DIASTOLIC BLOOD PRESSURE: 73 MMHG | HEART RATE: 72 BPM

## 2021-01-07 DIAGNOSIS — J31.0 CHRONIC RHINITIS: ICD-10-CM

## 2021-01-07 DIAGNOSIS — J32.9 CHRONIC RHINITIS: ICD-10-CM

## 2021-01-07 LAB
ANION GAP SERPL CALC-SCNC: 14 MMOL/L
BUN SERPL-MCNC: 28 MG/DL
CALCIUM SERPL-MCNC: 9.1 MG/DL
CHLORIDE SERPL-SCNC: 105 MMOL/L
CO2 SERPL-SCNC: 21 MMOL/L
CREAT SERPL-MCNC: 1.47 MG/DL
CREAT SPEC-SCNC: 108 MG/DL
GLUCOSE SERPL-MCNC: 85 MG/DL
MICROALBUMIN 24H UR DL<=1MG/L-MCNC: 5.9 MG/DL
MICROALBUMIN/CREAT 24H UR-RTO: 55 MG/G
POTASSIUM SERPL-SCNC: 4.4 MMOL/L
SODIUM SERPL-SCNC: 140 MMOL/L

## 2021-01-07 PROCEDURE — 99214 OFFICE O/P EST MOD 30 MIN: CPT

## 2021-01-07 NOTE — PHYSICAL EXAM
[General Appearance - Alert] : alert [General Appearance - In No Acute Distress] : in no acute distress [Sclera] : the sclera and conjunctiva were normal [PERRL With Normal Accommodation] : pupils were equal in size, round, and reactive to light [Extraocular Movements] : extraocular movements were intact [FreeTextEntry1] : left eye abnormal [Outer Ear] : the ears and nose were normal in appearance [Oropharynx] : the oropharynx was normal [Neck Appearance] : the appearance of the neck was normal [Neck Cervical Mass (___cm)] : no neck mass was observed [Jugular Venous Distention Increased] : there was no jugular-venous distention [Auscultation Breath Sounds / Voice Sounds] : lungs were clear to auscultation bilaterally [Heart Rate And Rhythm] : heart rate was normal and rhythm regular [Heart Sounds] : normal S1 and S2 [Heart Sounds Gallop] : no gallops [Murmurs] : no murmurs [Heart Sounds Pericardial Friction Rub] : no pericardial rub [Full Pulse] : the pedal pulses are present [Edema] : there was no peripheral edema [No CVA Tenderness] : no ~M costovertebral angle tenderness [No Spinal Tenderness] : no spinal tenderness [Skin Color & Pigmentation] : normal skin color and pigmentation [Skin Turgor] : normal skin turgor [] : no rash [Deep Tendon Reflexes (DTR)] : deep tendon reflexes were 2+ and symmetric [Sensation] : the sensory exam was normal to light touch and pinprick [No Focal Deficits] : no focal deficits [Oriented To Time, Place, And Person] : oriented to person, place, and time [Impaired Insight] : insight and judgment were intact [Affect] : the affect was normal
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
## 2021-01-07 NOTE — ASSESSMENT
[FreeTextEntry1] : 70-year-old woman with what appears to be suboptimally controlled hypertension, unless there is a huge component of whitecoat effect.  I suspect her BP is largely real and agree with the addition of hydrochlorothiazide.  If her K were lower, I would consider spironolactone, but this is not feasible at this time.  We will await the response to HCTZ over the next several weeks.  I have also asked her to buy an Omron cuff for upper arm and bring it to the next visit.  Depending on home readings as compared to the office, we may need to do 24-hour ambulatory monitoring to better clarify her real world blood pressure.  I have asked her to obtain a BMP, urinalysis, urine microalbumin.  Her kidneys were echogenic on ultrasound back in July, consistent with her CKD 3.

## 2021-01-11 LAB
APPEARANCE: CLEAR
BACTERIA: NEGATIVE
BILIRUBIN URINE: NEGATIVE
BLOOD URINE: NEGATIVE
COLOR: NORMAL
GLUCOSE QUALITATIVE U: NEGATIVE
HYALINE CASTS: 1 /LPF
KETONES URINE: NEGATIVE
LEUKOCYTE ESTERASE URINE: NEGATIVE
MICROSCOPIC-UA: NORMAL
NITRITE URINE: NEGATIVE
PH URINE: 6
PROTEIN URINE: NORMAL
RED BLOOD CELLS URINE: 1 /HPF
SPECIFIC GRAVITY URINE: 1.02
SQUAMOUS EPITHELIAL CELLS: 0 /HPF
UROBILINOGEN URINE: NORMAL
WHITE BLOOD CELLS URINE: 1 /HPF

## 2021-02-08 ENCOUNTER — APPOINTMENT (OUTPATIENT)
Dept: NEPHROLOGY | Facility: CLINIC | Age: 71
End: 2021-02-08
Payer: MEDICARE

## 2021-02-08 VITALS
HEIGHT: 66 IN | WEIGHT: 148 LBS | DIASTOLIC BLOOD PRESSURE: 77 MMHG | HEART RATE: 84 BPM | BODY MASS INDEX: 23.78 KG/M2 | SYSTOLIC BLOOD PRESSURE: 154 MMHG

## 2021-02-08 PROCEDURE — 99214 OFFICE O/P EST MOD 30 MIN: CPT

## 2021-02-08 NOTE — ASSESSMENT
[FreeTextEntry1] : 70-year-old woman with gradual progression of CKD, despite dramatic improvement in microalbuminuria and excellent BP control.  She clearly has whitecoat effect, with office readings in the 150s and home readings in the 120s.  I have checked her Omron home BP device and it is perfectly accurate.  She read 152 systolic here when ours read 154.  I have asked her to obtain labs in 4 months, including BMP, phosphorus, PTH, and urine microalbumin just prior to her next appointment.  She is going to [place] for several weeks and will have a Covid nasopharyngeal swab done 4 days before or less.  She will see Dr. Houser on February 19.  I explained her lab results to her item by item.
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
## 2021-02-08 NOTE — CONSULT LETTER
[Dear  ___] : Dear  [unfilled], [Consult Letter:] : I had the pleasure of evaluating your patient, [unfilled]. [Please see my note below.] : Please see my note below. [Consult Closing:] : Thank you very much for allowing me to participate in the care of this patient.  If you have any questions, please do not hesitate to contact me. [Sincerely,] : Sincerely, [FreeTextEntry2] : Dr Houser [FreeTextEntry3] : Sincerely, \par \par Nahum Mcknight MD, FACP

## 2021-02-08 NOTE — PHYSICAL EXAM
[General Appearance - Alert] : alert [General Appearance - In No Acute Distress] : in no acute distress [Sclera] : the sclera and conjunctiva were normal [PERRL With Normal Accommodation] : pupils were equal in size, round, and reactive to light [Extraocular Movements] : extraocular movements were intact [FreeTextEntry1] : left eye abnormal [Outer Ear] : the ears and nose were normal in appearance [Oropharynx] : the oropharynx was normal [Neck Appearance] : the appearance of the neck was normal [Neck Cervical Mass (___cm)] : no neck mass was observed [Jugular Venous Distention Increased] : there was no jugular-venous distention [Auscultation Breath Sounds / Voice Sounds] : lungs were clear to auscultation bilaterally [Heart Rate And Rhythm] : heart rate was normal and rhythm regular [Heart Sounds] : normal S1 and S2 [Murmurs] : no murmurs [Heart Sounds Gallop] : no gallops [Heart Sounds Pericardial Friction Rub] : no pericardial rub [Full Pulse] : the pedal pulses are present [No CVA Tenderness] : no ~M costovertebral angle tenderness [Edema] : there was no peripheral edema [No Spinal Tenderness] : no spinal tenderness [Skin Color & Pigmentation] : normal skin color and pigmentation [Skin Turgor] : normal skin turgor [] : no rash [Sensation] : the sensory exam was normal to light touch and pinprick [Deep Tendon Reflexes (DTR)] : deep tendon reflexes were 2+ and symmetric [No Focal Deficits] : no focal deficits [Oriented To Time, Place, And Person] : oriented to person, place, and time [Impaired Insight] : insight and judgment were intact [Affect] : the affect was normal

## 2021-03-24 LAB — SARS-COV-2 N GENE NPH QL NAA+PROBE: NOT DETECTED

## 2021-04-06 ENCOUNTER — APPOINTMENT (OUTPATIENT)
Dept: HEART AND VASCULAR | Facility: CLINIC | Age: 71
End: 2021-04-06
Payer: MEDICARE

## 2021-04-06 VITALS
DIASTOLIC BLOOD PRESSURE: 80 MMHG | WEIGHT: 144 LBS | SYSTOLIC BLOOD PRESSURE: 170 MMHG | HEART RATE: 66 BPM | BODY MASS INDEX: 23.14 KG/M2 | HEIGHT: 66 IN

## 2021-04-06 PROCEDURE — 99213 OFFICE O/P EST LOW 20 MIN: CPT

## 2021-04-06 NOTE — REASON FOR VISIT
[FreeTextEntry1] : Kusum Swanson is a 71 yo woman who is here for a BP check.\par \par She saw her nephrologist who checked her home cuff which was accurate and diagnosed "white coat syndrome."\par \par Her BP in the office today is 170/80 (pt didn’t take meds today bc she was running late). Of note - her BP at home this morning was 129/86.

## 2021-04-06 NOTE — ASSESSMENT
[FreeTextEntry1] : Kusum Swanson is a 69 yo woman who is here for a BP check.\par \par She saw her nephrologist who checked her home cuff which was accurate and diagnosed "white coat syndrome."\par \par Her BP in the office today is 170/80 (pt didn’t take meds today bc she was running late). Of note - her BP at home this morning was 129/86.\par \par We will continue her current regimen and she will return in December after her stay in [facility].

## 2021-05-05 ENCOUNTER — RX RENEWAL (OUTPATIENT)
Age: 71
End: 2021-05-05

## 2021-05-17 ENCOUNTER — RX RENEWAL (OUTPATIENT)
Age: 71
End: 2021-05-17

## 2021-05-20 ENCOUNTER — APPOINTMENT (OUTPATIENT)
Dept: INTERNAL MEDICINE | Facility: CLINIC | Age: 71
End: 2021-05-20
Payer: MEDICARE

## 2021-05-20 ENCOUNTER — NON-APPOINTMENT (OUTPATIENT)
Age: 71
End: 2021-05-20

## 2021-05-20 VITALS
SYSTOLIC BLOOD PRESSURE: 125 MMHG | TEMPERATURE: 98 F | DIASTOLIC BLOOD PRESSURE: 65 MMHG | HEART RATE: 71 BPM | OXYGEN SATURATION: 100 %

## 2021-05-20 DIAGNOSIS — G25.2 OTHER SPECIFIED FORMS OF TREMOR: ICD-10-CM

## 2021-05-20 PROCEDURE — 99214 OFFICE O/P EST MOD 30 MIN: CPT | Mod: GC,25

## 2021-05-20 PROCEDURE — 36415 COLL VENOUS BLD VENIPUNCTURE: CPT

## 2021-05-24 LAB
ANION GAP SERPL CALC-SCNC: 13 MMOL/L
BUN SERPL-MCNC: 28 MG/DL
CALCIUM SERPL-MCNC: 9.6 MG/DL
CALCIUM SERPL-MCNC: 9.6 MG/DL
CHLORIDE SERPL-SCNC: 105 MMOL/L
CO2 SERPL-SCNC: 22 MMOL/L
CREAT SERPL-MCNC: 1.52 MG/DL
CREAT SPEC-SCNC: 156 MG/DL
GLUCOSE SERPL-MCNC: 89 MG/DL
MICROALBUMIN 24H UR DL<=1MG/L-MCNC: 2.1 MG/DL
MICROALBUMIN/CREAT 24H UR-RTO: 13 MG/G
PARATHYROID HORMONE INTACT: 107 PG/ML
PHOSPHATE SERPL-MCNC: 4.4 MG/DL
POTASSIUM SERPL-SCNC: 4.9 MMOL/L
SODIUM SERPL-SCNC: 140 MMOL/L
TSH SERPL-ACNC: 5.11 UIU/ML

## 2021-06-07 ENCOUNTER — APPOINTMENT (OUTPATIENT)
Dept: NEPHROLOGY | Facility: CLINIC | Age: 71
End: 2021-06-07
Payer: MEDICARE

## 2021-06-07 VITALS
HEIGHT: 66 IN | WEIGHT: 144 LBS | SYSTOLIC BLOOD PRESSURE: 134 MMHG | BODY MASS INDEX: 23.14 KG/M2 | DIASTOLIC BLOOD PRESSURE: 67 MMHG | HEART RATE: 76 BPM

## 2021-06-07 DIAGNOSIS — R05 COUGH: ICD-10-CM

## 2021-06-07 PROCEDURE — 99214 OFFICE O/P EST MOD 30 MIN: CPT
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
## 2021-06-07 NOTE — PHYSICAL EXAM
[General Appearance - Alert] : alert [General Appearance - In No Acute Distress] : in no acute distress [Sclera] : the sclera and conjunctiva were normal [PERRL With Normal Accommodation] : pupils were equal in size, round, and reactive to light [Extraocular Movements] : extraocular movements were intact [FreeTextEntry1] : left eye abnormal [Outer Ear] : the ears and nose were normal in appearance [Oropharynx] : the oropharynx was normal [Neck Appearance] : the appearance of the neck was normal [Neck Cervical Mass (___cm)] : no neck mass was observed [Jugular Venous Distention Increased] : there was no jugular-venous distention [Auscultation Breath Sounds / Voice Sounds] : lungs were clear to auscultation bilaterally [Heart Rate And Rhythm] : heart rate was normal and rhythm regular [Heart Sounds] : normal S1 and S2 [Heart Sounds Gallop] : no gallops [Murmurs] : no murmurs [Heart Sounds Pericardial Friction Rub] : no pericardial rub [Full Pulse] : the pedal pulses are present [Edema] : there was no peripheral edema [No CVA Tenderness] : no ~M costovertebral angle tenderness [No Spinal Tenderness] : no spinal tenderness [Skin Color & Pigmentation] : normal skin color and pigmentation [Skin Turgor] : normal skin turgor [] : no rash [Deep Tendon Reflexes (DTR)] : deep tendon reflexes were 2+ and symmetric [No Focal Deficits] : no focal deficits [Sensation] : the sensory exam was normal to light touch and pinprick [Oriented To Time, Place, And Person] : oriented to person, place, and time [Impaired Insight] : insight and judgment were intact [Affect] : the affect was normal

## 2021-09-03 ENCOUNTER — APPOINTMENT (OUTPATIENT)
Age: 71
End: 2021-09-03

## 2021-09-03 ENCOUNTER — OUTPATIENT (OUTPATIENT)
Dept: OUTPATIENT SERVICES | Facility: HOSPITAL | Age: 71
LOS: 1 days | End: 2021-09-03
Payer: MEDICARE

## 2021-09-03 VITALS
SYSTOLIC BLOOD PRESSURE: 131 MMHG | WEIGHT: 141.1 LBS | HEART RATE: 63 BPM | TEMPERATURE: 97 F | RESPIRATION RATE: 18 BRPM | HEIGHT: 66 IN | DIASTOLIC BLOOD PRESSURE: 72 MMHG | OXYGEN SATURATION: 100 %

## 2021-09-03 DIAGNOSIS — D64.9 ANEMIA, UNSPECIFIED: ICD-10-CM

## 2021-09-06 ENCOUNTER — RX RENEWAL (OUTPATIENT)
Age: 71
End: 2021-09-06

## 2021-11-08 ENCOUNTER — APPOINTMENT (OUTPATIENT)
Age: 71
End: 2021-11-08

## 2021-11-08 ENCOUNTER — OUTPATIENT (OUTPATIENT)
Dept: OUTPATIENT SERVICES | Facility: HOSPITAL | Age: 71
LOS: 1 days | End: 2021-11-08
Payer: MEDICARE

## 2021-11-08 DIAGNOSIS — D64.9 ANEMIA, UNSPECIFIED: ICD-10-CM

## 2021-11-08 PROCEDURE — 86850 RBC ANTIBODY SCREEN: CPT

## 2021-11-08 PROCEDURE — 86900 BLOOD TYPING SEROLOGIC ABO: CPT

## 2021-11-08 PROCEDURE — P9040: CPT

## 2021-11-08 PROCEDURE — 36430 TRANSFUSION BLD/BLD COMPNT: CPT

## 2021-11-08 PROCEDURE — 86901 BLOOD TYPING SEROLOGIC RH(D): CPT

## 2021-11-08 PROCEDURE — 86923 COMPATIBILITY TEST ELECTRIC: CPT

## 2021-11-08 PROCEDURE — 36415 COLL VENOUS BLD VENIPUNCTURE: CPT

## 2021-11-10 ENCOUNTER — APPOINTMENT (OUTPATIENT)
Dept: NEPHROLOGY | Facility: CLINIC | Age: 71
End: 2021-11-10
Payer: MEDICARE

## 2021-11-10 VITALS
HEART RATE: 80 BPM | SYSTOLIC BLOOD PRESSURE: 132 MMHG | BODY MASS INDEX: 22.98 KG/M2 | WEIGHT: 143 LBS | HEIGHT: 66 IN | DIASTOLIC BLOOD PRESSURE: 66 MMHG

## 2021-11-10 DIAGNOSIS — Z87.09 PERSONAL HISTORY OF OTHER DISEASES OF THE RESPIRATORY SYSTEM: ICD-10-CM

## 2021-11-10 DIAGNOSIS — E88.81 METABOLIC SYNDROME: ICD-10-CM

## 2021-11-10 PROCEDURE — 99214 OFFICE O/P EST MOD 30 MIN: CPT

## 2021-11-10 NOTE — ASSESSMENT
[FreeTextEntry1] : 71-year-old woman with well-controlled hypertension, stable CKD, metabolic acidosis -I have asked her to increase sodium bicarbonate from 650 once a day to 3/day.  I explained that this can slow the progression of renal disease and keep her bones stronger.  It also helps muscle function.  I encouraged her to think positively and that the latest incident with severe anemia does not mean she is dying.  I have ordered labs to include BMP, PTH, and phosphorus, and she will return in 3 to 4 months.  She will have the labs drawn at [facility] on November 22.

## 2021-11-10 NOTE — CONSULT LETTER
[Dear  ___] : Dear  [unfilled], [Consult Letter:] : I had the pleasure of evaluating your patient, [unfilled]. [Please see my note below.] : Please see my note below. [Sincerely,] : Sincerely, [FreeTextEntry2] : [facility] [FreeTextEntry3] : Sincerely, \par \par Nahum Mcknight MD, FACP
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
## 2021-11-15 DIAGNOSIS — D75.81 MYELOFIBROSIS: ICD-10-CM

## 2021-12-03 ENCOUNTER — RX RENEWAL (OUTPATIENT)
Age: 71
End: 2021-12-03

## 2021-12-07 ENCOUNTER — APPOINTMENT (OUTPATIENT)
Dept: HEART AND VASCULAR | Facility: CLINIC | Age: 71
End: 2021-12-07
Payer: MEDICARE

## 2021-12-07 VITALS
SYSTOLIC BLOOD PRESSURE: 142 MMHG | BODY MASS INDEX: 22.82 KG/M2 | WEIGHT: 142 LBS | DIASTOLIC BLOOD PRESSURE: 70 MMHG | HEART RATE: 81 BPM | TEMPERATURE: 97.9 F | OXYGEN SATURATION: 99 % | HEIGHT: 66 IN

## 2021-12-07 PROCEDURE — 93000 ELECTROCARDIOGRAM COMPLETE: CPT

## 2021-12-07 PROCEDURE — 99213 OFFICE O/P EST LOW 20 MIN: CPT | Mod: 25
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
## 2021-12-23 ENCOUNTER — APPOINTMENT (OUTPATIENT)
Dept: INTERNAL MEDICINE | Facility: CLINIC | Age: 71
End: 2021-12-23
Payer: MEDICARE

## 2021-12-23 VITALS
TEMPERATURE: 98 F | OXYGEN SATURATION: 100 % | SYSTOLIC BLOOD PRESSURE: 132 MMHG | DIASTOLIC BLOOD PRESSURE: 72 MMHG | HEART RATE: 76 BPM | BODY MASS INDEX: 22.66 KG/M2 | HEIGHT: 66 IN | WEIGHT: 141 LBS

## 2021-12-23 DIAGNOSIS — Z71.89 OTHER SPECIFIED COUNSELING: ICD-10-CM

## 2021-12-23 DIAGNOSIS — Z00.00 ENCOUNTER FOR GENERAL ADULT MEDICAL EXAMINATION W/OUT ABNORMAL FINDINGS: ICD-10-CM

## 2021-12-23 DIAGNOSIS — Z12.39 ENCOUNTER FOR OTHER SCREENING FOR MALIGNANT NEOPLASM OF BREAST: ICD-10-CM

## 2021-12-23 PROCEDURE — G0439: CPT | Mod: GC

## 2021-12-24 PROBLEM — Z12.39 BREAST CANCER SCREENING: Status: ACTIVE | Noted: 2019-12-19

## 2021-12-24 PROBLEM — Z71.89 ADVANCE CARE PLANNING: Status: ACTIVE | Noted: 2021-12-24

## 2021-12-24 NOTE — PLAN
[FreeTextEntry1] : #HTN\par /71 today.\par - c/w home meds: amlodipine 10mg qd, losartan 100mg qd, HCTZ 12.5mg qd\par - follows with Dr. Keen (cards)\par \par #HLD\par - c/w atorvastatin 40mg qd\par \par #CKDIII\par Cr 1.34 (11/2021)\par - c/w sodium bicarb 650mg bid\par - follows with Dr. Mcknight\par \par #Myelofibrosis\par 2/2 polycythemia vera.\par - c/w Jakafi 20mg bid\par - follows with Dr. Stroud (heme/onc)\par \par #Vitamin D deficiency\par Likely 2/2 CKD\par - c/w vitamin d supplementation\par - DEXA 2018 w/o signs of osteoporosis\par \par #Anemia\par Likely 2/2 CKD\par \par #HCM\par - UTD vaccines\par - UTD C-scope\par - received flu vaccine 11/2021\par - received covid booster 12/12/2021\par - Screening mammogram referral  \par - patient wishes to be DNR/DNI\par \par F/u 6 months: can further discuss GOC and signing MOLST\par \par \par
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
## 2021-12-24 NOTE — END OF VISIT
[FreeTextEntry3] : I saw and evaluated the patient.  The findings and assessment were discussed with the resident and I agree with resident’s plan as documented in the above, in the resident’s note.\par \par Pertinent exam findings: Well-appearing, NAD.\par \par HTN controlled\par Myelofibrosis: follows w/ heme, on jakafi\par CKD III stable\par \par Mammo ordered.\par GOC discussion and advance care planning started.\par PHQ2 okay.

## 2021-12-24 NOTE — PHYSICAL EXAM
[No Acute Distress] : no acute distress [Well Nourished] : well nourished [Well Developed] : well developed [Well-Appearing] : well-appearing [Normal Oropharynx] : the oropharynx was normal [Normal TMs] : both tympanic membranes were normal [No Lymphadenopathy] : no lymphadenopathy [Supple] : supple [Thyroid Normal, No Nodules] : the thyroid was normal and there were no nodules present [Normal Rate] : normal rate  [Regular Rhythm] : with a regular rhythm [Normal S1, S2] : normal S1 and S2 [No Edema] : there was no peripheral edema [Soft] : abdomen soft [Non Tender] : non-tender [Non-distended] : non-distended [Normal Bowel Sounds] : normal bowel sounds [Normal] : affect was normal and insight and judgment were intact [de-identified] : elderly, dysphonia [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

## 2022-01-12 ENCOUNTER — INPATIENT (INPATIENT)
Facility: HOSPITAL | Age: 72
LOS: 1 days | Discharge: HOME | DRG: 811 | End: 2022-01-14
Attending: HOSPITALIST | Admitting: HOSPITALIST
Payer: MEDICARE

## 2022-01-12 VITALS
WEIGHT: 132.94 LBS | HEART RATE: 113 BPM | SYSTOLIC BLOOD PRESSURE: 123 MMHG | TEMPERATURE: 99 F | DIASTOLIC BLOOD PRESSURE: 79 MMHG | HEIGHT: 66 IN | OXYGEN SATURATION: 96 % | RESPIRATION RATE: 18 BRPM

## 2022-01-12 DIAGNOSIS — J06.9 ACUTE UPPER RESPIRATORY INFECTION, UNSPECIFIED: ICD-10-CM

## 2022-01-12 DIAGNOSIS — D46.9 MYELODYSPLASTIC SYNDROME, UNSPECIFIED: ICD-10-CM

## 2022-01-12 DIAGNOSIS — D63.0 ANEMIA IN NEOPLASTIC DISEASE: ICD-10-CM
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
## 2022-01-12 DIAGNOSIS — U07.1 COVID-19: ICD-10-CM

## 2022-01-12 DIAGNOSIS — R79.89 OTHER SPECIFIED ABNORMAL FINDINGS OF BLOOD CHEMISTRY: ICD-10-CM

## 2022-01-12 DIAGNOSIS — E87.2 ACIDOSIS: ICD-10-CM

## 2022-01-12 DIAGNOSIS — R00.0 TACHYCARDIA, UNSPECIFIED: ICD-10-CM

## 2022-01-12 DIAGNOSIS — Z29.9 ENCOUNTER FOR PROPHYLACTIC MEASURES, UNSPECIFIED: ICD-10-CM

## 2022-01-12 DIAGNOSIS — I10 ESSENTIAL (PRIMARY) HYPERTENSION: ICD-10-CM

## 2022-01-12 LAB
ALBUMIN SERPL ELPH-MCNC: 4.4 G/DL — SIGNIFICANT CHANGE UP (ref 3.3–5)
ALP SERPL-CCNC: 78 U/L — SIGNIFICANT CHANGE UP (ref 40–120)
ALT FLD-CCNC: 29 U/L — SIGNIFICANT CHANGE UP (ref 10–45)
ANION GAP SERPL CALC-SCNC: 18 MMOL/L — HIGH (ref 5–17)
ANISOCYTOSIS BLD QL: SLIGHT — SIGNIFICANT CHANGE UP
AST SERPL-CCNC: 27 U/L — SIGNIFICANT CHANGE UP (ref 10–40)
BASOPHILS # BLD AUTO: 0.08 K/UL — SIGNIFICANT CHANGE UP (ref 0–0.2)
BASOPHILS NFR BLD AUTO: 1.8 % — SIGNIFICANT CHANGE UP (ref 0–2)
BILIRUB SERPL-MCNC: 0.4 MG/DL — SIGNIFICANT CHANGE UP (ref 0.2–1.2)
BLD GP AB SCN SERPL QL: NEGATIVE — SIGNIFICANT CHANGE UP
BUN SERPL-MCNC: 85 MG/DL — HIGH (ref 7–23)
CALCIUM SERPL-MCNC: 9.7 MG/DL — SIGNIFICANT CHANGE UP (ref 8.4–10.5)
CHLORIDE SERPL-SCNC: 105 MMOL/L — SIGNIFICANT CHANGE UP (ref 96–108)
CO2 SERPL-SCNC: 18 MMOL/L — LOW (ref 22–31)
CREAT SERPL-MCNC: 3.86 MG/DL — HIGH (ref 0.5–1.3)
CRP SERPL-MCNC: 53.6 MG/L — HIGH (ref 0–4)
DACRYOCYTES BLD QL SMEAR: SIGNIFICANT CHANGE UP
EOSINOPHIL # BLD AUTO: 0 K/UL — SIGNIFICANT CHANGE UP (ref 0–0.5)
EOSINOPHIL NFR BLD AUTO: 0 % — SIGNIFICANT CHANGE UP (ref 0–6)
GIANT PLATELETS BLD QL SMEAR: PRESENT — SIGNIFICANT CHANGE UP
GLUCOSE SERPL-MCNC: 124 MG/DL — HIGH (ref 70–99)
HCT VFR BLD CALC: 21.5 % — LOW (ref 34.5–45)
HGB BLD-MCNC: 6.6 G/DL — CRITICAL LOW (ref 11.5–15.5)
HYPOCHROMIA BLD QL: SLIGHT — SIGNIFICANT CHANGE UP
LYMPHOCYTES # BLD AUTO: 0.76 K/UL — LOW (ref 1–3.3)
LYMPHOCYTES # BLD AUTO: 16.5 % — SIGNIFICANT CHANGE UP (ref 13–44)
MACROCYTES BLD QL: SLIGHT — SIGNIFICANT CHANGE UP
MANUAL SMEAR VERIFICATION: SIGNIFICANT CHANGE UP
MCHC RBC-ENTMCNC: 30.7 GM/DL — LOW (ref 32–36)
MCHC RBC-ENTMCNC: 31.1 PG — SIGNIFICANT CHANGE UP (ref 27–34)
MCV RBC AUTO: 101.4 FL — HIGH (ref 80–100)
MICROCYTES BLD QL: SLIGHT — SIGNIFICANT CHANGE UP
MONOCYTES # BLD AUTO: 0.13 K/UL — SIGNIFICANT CHANGE UP (ref 0–0.9)
MONOCYTES NFR BLD AUTO: 2.8 % — SIGNIFICANT CHANGE UP (ref 2–14)
MYELOCYTES NFR BLD: 15.6 % — HIGH (ref 0–0)
NEUTROPHILS # BLD AUTO: 2.91 K/UL — SIGNIFICANT CHANGE UP (ref 1.8–7.4)
NEUTROPHILS NFR BLD AUTO: 58.7 % — SIGNIFICANT CHANGE UP (ref 43–77)
NEUTS BAND # BLD: 4.6 % — SIGNIFICANT CHANGE UP (ref 0–8)
NRBC # BLD: 2 /100 — HIGH (ref 0–0)
NRBC # BLD: SIGNIFICANT CHANGE UP /100 WBCS (ref 0–0)
OVALOCYTES BLD QL SMEAR: SLIGHT — SIGNIFICANT CHANGE UP
PLAT MORPH BLD: ABNORMAL
PLATELET # BLD AUTO: 231 K/UL — SIGNIFICANT CHANGE UP (ref 150–400)
POIKILOCYTOSIS BLD QL AUTO: SIGNIFICANT CHANGE UP
POLYCHROMASIA BLD QL SMEAR: SLIGHT — SIGNIFICANT CHANGE UP
POTASSIUM SERPL-MCNC: 3.7 MMOL/L — SIGNIFICANT CHANGE UP (ref 3.5–5.3)
POTASSIUM SERPL-SCNC: 3.7 MMOL/L — SIGNIFICANT CHANGE UP (ref 3.5–5.3)
PROT SERPL-MCNC: 8 G/DL — SIGNIFICANT CHANGE UP (ref 6–8.3)
RBC # BLD: 2.12 M/UL — LOW (ref 3.8–5.2)
RBC # FLD: 19.2 % — HIGH (ref 10.3–14.5)
RBC BLD AUTO: ABNORMAL
RH IG SCN BLD-IMP: POSITIVE — SIGNIFICANT CHANGE UP
SARS-COV-2 RNA SPEC QL NAA+PROBE: DETECTED
SODIUM SERPL-SCNC: 141 MMOL/L — SIGNIFICANT CHANGE UP (ref 135–145)
WBC # BLD: 4.6 K/UL — SIGNIFICANT CHANGE UP (ref 3.8–10.5)
WBC # FLD AUTO: 4.6 K/UL — SIGNIFICANT CHANGE UP (ref 3.8–10.5)

## 2022-01-12 PROCEDURE — 99285 EMERGENCY DEPT VISIT HI MDM: CPT | Mod: CS

## 2022-01-12 PROCEDURE — 71045 X-RAY EXAM CHEST 1 VIEW: CPT | Mod: 26

## 2022-01-12 PROCEDURE — 99223 1ST HOSP IP/OBS HIGH 75: CPT

## 2022-01-12 RX ORDER — SODIUM BICARBONATE 1 MEQ/ML
650 SYRINGE (ML) INTRAVENOUS
Refills: 0 | Status: DISCONTINUED | OUTPATIENT
Start: 2022-01-12 | End: 2022-01-14

## 2022-01-12 RX ORDER — ACETAMINOPHEN 500 MG
650 TABLET ORAL EVERY 6 HOURS
Refills: 0 | Status: DISCONTINUED | OUTPATIENT
Start: 2022-01-12 | End: 2022-01-14

## 2022-01-12 RX ORDER — SODIUM CHLORIDE 9 MG/ML
1000 INJECTION INTRAMUSCULAR; INTRAVENOUS; SUBCUTANEOUS ONCE
Refills: 0 | Status: COMPLETED | OUTPATIENT
Start: 2022-01-12 | End: 2022-01-12

## 2022-01-12 RX ORDER — AMLODIPINE BESYLATE 2.5 MG/1
10 TABLET ORAL DAILY
Refills: 0 | Status: DISCONTINUED | OUTPATIENT
Start: 2022-01-13 | End: 2022-01-14

## 2022-01-12 RX ORDER — LANOLIN ALCOHOL/MO/W.PET/CERES
3 CREAM (GRAM) TOPICAL AT BEDTIME
Refills: 0 | Status: DISCONTINUED | OUTPATIENT
Start: 2022-01-12 | End: 2022-01-14

## 2022-01-12 RX ORDER — ATORVASTATIN CALCIUM 80 MG/1
40 TABLET, FILM COATED ORAL AT BEDTIME
Refills: 0 | Status: DISCONTINUED | OUTPATIENT
Start: 2022-01-12 | End: 2022-01-14

## 2022-01-12 RX ADMIN — SODIUM CHLORIDE 3000 MILLILITER(S): 9 INJECTION INTRAMUSCULAR; INTRAVENOUS; SUBCUTANEOUS at 18:23

## 2022-01-12 RX ADMIN — ATORVASTATIN CALCIUM 40 MILLIGRAM(S): 80 TABLET, FILM COATED ORAL at 22:00

## 2022-01-12 RX ADMIN — SODIUM CHLORIDE 2000 MILLILITER(S): 9 INJECTION INTRAMUSCULAR; INTRAVENOUS; SUBCUTANEOUS at 16:53

## 2022-01-12 NOTE — ED PROVIDER NOTE - NS ED ROS FT
CONSTITUTIONAL:  weakness, fatigue  HEENT:  nasal congestion  CARDIOVASCULAR:  No chest pain, chest pressure or chest discomfort. No palpitations.  RESPIRATORY:  cough, no sob   GASTROINTESTINAL:  No anorexia, nausea, vomiting or diarrhea. No abdominal pain or blood.  GENITOURINARY:  Denies hematuria, dysuria.   NEUROLOGICAL:  No headache, dizziness, syncope, paralysis, ataxia, numbness or tingling in the extremities. No change in bowel or bladder control.  MUSCULOSKELETAL:  No muscle, back pain, joint pain or stiffness  LYMPHATICS:  No enlarged nodes.   PSYCHIATRIC:  No history of depression or anxiety.  ENDOCRINOLOGIC:  No reports of sweating, cold or heat intolerance. No polyuria or polydipsia.  ALLERGIES:  No history of asthma, hives, eczema or rhinitis.

## 2022-01-12 NOTE — H&P ADULT - PROBLEM SELECTOR PLAN 8
F: s/p 2LNS  E: cautious repletion given decreased renal function  N: DASH/TLC diet  DVT ppx: SCDs  GI ppx: none    Code: FULL  Dispo: COVID RMF

## 2022-01-12 NOTE — H&P ADULT - ASSESSMENT
72 yo F w/ PMHx of HTN, HLD, MDS/Myelofibrosis on oral chemo (baseline Hgb 7-8, follows w/ Dr. Stroud), who presented after being sent to ED by Dr. Stroud for URI/flu-like symptoms and lethargy x 1 week since 1/6. Admitted for anemia requiring transfusion and elevated creatinine.

## 2022-01-12 NOTE — H&P ADULT - NSHPLABSRESULTS_GEN_ALL_CORE
LABS:                         6.6    4.60  )-----------( 231      ( 12 Jan 2022 17:00 )             21.5     01-12    141  |  105  |  85<H>  ----------------------------<  124<H>  3.7   |  18<L>  |  3.86<H>    Ca    9.7      12 Jan 2022 17:00    TPro  8.0  /  Alb  4.4  /  TBili  0.4  /  DBili  x   /  AST  27  /  ALT  29  /  AlkPhos  78  01-12        RADIOLOGY, EKG & ADDITIONAL TESTS: Reviewed.

## 2022-01-12 NOTE — H&P ADULT - NSHPPHYSICALEXAM_GEN_ALL_CORE
VITAL SIGNS:  T(C): 37.1 (01-12-22 @ 19:39), Max: 37.1 (01-12-22 @ 15:56)  T(F): 98.8 (01-12-22 @ 19:39), Max: 98.8 (01-12-22 @ 19:39)  HR: 92 (01-12-22 @ 19:39) (92 - 113)  BP: 143/66 (01-12-22 @ 19:39) (123/79 - 144/71)  BP(mean): --  RR: 17 (01-12-22 @ 19:39) (17 - 18)  SpO2: 95% (01-12-22 @ 19:39) (95% - 96%)  Wt(kg): --    PHYSICAL EXAM:  Constitutional: WDWN resting comfortably in bed; NAD  Head: NC/AT  Eyes: PERRL, EOMI, anicteric sclera  ENT: no nasal discharge; uvula midline, no oropharyngeal erythema or exudates; dry MM  Neck: supple; no JVD or thyromegaly  Respiratory: CTA B/L; no W/R/R, no retractions  Cardiac: +S1/S2; irregular rhythm, regular rate; no M/R/G; PMI non-displaced  Gastrointestinal: abdomen soft, NT/ND; no rebound or guarding; +BSx4  Extremities: WWP, no clubbing or cyanosis; no peripheral edema  Musculoskeletal: NROM x4; no joint swelling, tenderness or erythema  Vascular: 2+ radial, DP/PT pulses B/L  Dermatologic: skin warm, dry and intact; no rashes, wounds, or scars  Lymphatic: no submandibular or cervical LAD  Neurologic: AAOx3; CNII-XII grossly intact; no focal deficits  Psychiatric: affect and characteristics of appearance, verbalizations, behaviors are appropriate

## 2022-01-12 NOTE — H&P ADULT - PROBLEM SELECTOR PLAN 3
URI symptoms x 1 week starting 1/6, COVID positive this admission. No hypoxemia on presentation. No COVID contacts or travel. Pt is vaccinated J+J x2.   - Obtain approval for Sotrovimab infusion in AM  - F/u inflammatory markers in AM

## 2022-01-12 NOTE — ED PROVIDER NOTE - PHYSICAL EXAMINATION
VITAL SIGNS: I have reviewed nursing notes and confirm.  CONSTITUTIONAL: Well appearing, in no acute distress.   SKIN:  warm and dry, no acute rash.   HEAD:  normocephalic, atraumatic.  EYES: EOM intact; conjunctiva and sclera clear.  ENT: +rhinorrhea, dry MM  NECK: Supple; non tender.  CARD: S1, S2 normal; no murmurs, gallops, or rubs. tachycardic reg rhythm.   RESP:  Clear to auscultation b/l, no wheezes, rales or rhonchi.  ABD: Normal bowel sounds; soft; non-distended; non-tender; no guarding/ rebound.  EXT: Normal ROM. No clubbing, cyanosis or edema. 2+ pulses to b/l ue/le.  NEURO: Alert, oriented, grossly unremarkable  PSYCH: Cooperative, mood and affect appropriate.

## 2022-01-12 NOTE — H&P ADULT - PROBLEM SELECTOR PLAN 2
Presenting with BUN/creatinine 85/3.84, with baseline 28/1.52 in 5/2021. Concern for LUZMARIA on CKD stage 2. Pt denies urinary symptoms, unlikely post-obstructive LUZMARIA. Most likely pre-renal in setting of recent poor PO intake x 1 week and dehydration on exam. DDx include possible worsening renal injury from known MDS.  - S/p 2LNS in the ED, reassess for additional need for IVF in AM  - Trend daily BMP, Mg, Phos to monitor for renal failure  - F/u urinalysis, urine sodium, urine creatinine  - Consider renal ultrasound and Nephrology consult if worsening  - Holding home Losartan and HCTZ Presenting with BUN/creatinine 85/3.84, with baseline 28/1.52 in 5/2021. Concern for LUZMARIA on CKD stage 2. Pt denies urinary symptoms, unlikely post-obstructive LUZMARIA. Most likely pre-renal in setting of recent poor PO intake x 1 week and dehydration on exam. DDx include possible COVID ATN or worsening renal injury from known MDS.  - S/p 2LNS in the ED, reassess for additional need for IVF in AM  - Trend daily BMP, Mg, Phos to monitor for renal failure  - F/u urinalysis, urine sodium, urine creatinine  - Consider renal ultrasound and Nephrology consult if worsening  - Holding home Losartan and HCTZ

## 2022-01-12 NOTE — ED PROVIDER NOTE - CLINICAL SUMMARY MEDICAL DECISION MAKING FREE TEXT BOX
70 yo F h/o htn, MDS/Myelofibrosis on oral chemo (Hg 7-8) sent by hematologist for URI/flu like sx and lethargy x 6 days. PT endorses nasal congestion, myalgias, cough and fatigue. Covid vaccinnated. Decreased PO intake. AFebrile, satting well. + nasal congestion, dry MM, lungs CTABL, RRR. Suspect viral syndrome. r/o covid, r/o PNA. Concerning for dehydration.     Spoke with DR. Ibarra (hematology), states Hg 7.1 today in clinic (baseline), however pt looked lethargic to him so sent to ER. pending labs, covid test, and XR. NS 1L ordered. 72 yo F h/o htn, MDS/Myelofibrosis on oral chemo (Hg 7-8) sent by hematologist for URI/flu like sx and lethargy x 6 days. PT endorses nasal congestion, myalgias, cough and fatigue. Covid vaccinnated. Decreased PO intake. AFebrile, satting well. + nasal congestion, dry MM, lungs CTABL, RRR. Suspect viral syndrome. r/o covid, r/o PNA. Concerning for dehydration.     Spoke with DR. Ibarra (hematology), states Hg 7.1 today in clinic (baseline), however pt looked lethargic to him so sent to ER. pending labs, covid test, and XR. NS 1L ordered.    Hg 6.6, spoke with patient states she usually gets transfused when hemoglobin is in the 6s. Consent obtained. 1U pRBC ordered. Will admit for transfusion and IV hydration. 70 yo F h/o htn, MDS/Myelofibrosis on oral chemo (Hg 7-8) sent by hematologist for URI/flu like sx and lethargy x 6 days. PT endorses nasal congestion, myalgias, cough and fatigue. Covid vaccinnated. Decreased PO intake. AFebrile, satting well. + nasal congestion, dry MM, lungs CTABL, RRR. Suspect viral syndrome. r/o covid, r/o PNA. Concerning for dehydration.     Spoke with DR. Ibarra (hematology), states Hg 7.1 today in clinic (baseline), however pt looked lethargic to him so sent to ER. pending labs, covid test, and XR. NS 1L ordered.    Hg 6.6, spoke with patient states she usually gets transfused when hemoglobin is in the 6s. Consent obtained. 1U pRBC ordered. Will admit for transfusion and IV hydration. Cr 3.46 (Cr 1.5 in 5/21) so concern for LUZMARIA. UA/Urine electrolytes ordered.    Will admit for iV hydration and transfusion. 70 yo F h/o htn, MDS/Myelofibrosis on oral chemo (Hg 7-8) sent by hematologist for URI/flu like sx and lethargy x 6 days. PT endorses nasal congestion, myalgias, cough and fatigue. Covid vaccinnated. Decreased PO intake. AFebrile, satting well. + nasal congestion, dry MM, lungs CTABL, RRR. Suspect viral syndrome. r/o covid, r/o PNA. Concerning for dehydration.     Spoke with DR. Ibarra (hematology), states Hg 7.1 today in clinic (baseline), however pt looked lethargic to him so sent to ER. pending labs, covid test, and XR. NS 1L ordered.    Hg 6.6, spoke with patient states she usually gets transfused when hemoglobin is in the 6s. Consent obtained. 1U pRBC ordered. Will admit for transfusion and IV hydration. Cr 3.46 (Cr 1.5 in 5/21) so concern for LUZMARIA. UA/Urine electrolytes ordered.    covid positive after pt was admitted, pt made aware. PT is on isolation.     Will admit for iV hydration and transfusion.

## 2022-01-12 NOTE — ED ADULT TRIAGE NOTE - CHIEF COMPLAINT QUOTE
x 1 week of cough, generalized weakness and chills. sent in by hemologist to r/o covid. denies SOB, CP, fevers. speaking in clear, full coherent sentences.

## 2022-01-12 NOTE — ED PROVIDER NOTE - CARE PLAN
1 Principal Discharge DX:	Anemia in neoplastic disease  Secondary Diagnosis:	LUZMARIA (acute kidney injury)

## 2022-01-12 NOTE — H&P ADULT - PROBLEM SELECTOR PLAN 6
H/o HTN on Amlodipine 10mg daily, Losartan 100mg daily, HCTZ 12.5mg daily.  - Holding home Losartan and HCTZ for decreased renal function  - C/w Amlodipine 10mg daily

## 2022-01-12 NOTE — H&P ADULT - NSHPSOCIALHISTORY_GEN_ALL_CORE
Tobacco use:  EtOH use:  Illicit drug use:  Sexual hx:  Occupation:  Living situation:  Mobility: Tobacco use: last smoked 50 years ago  EtOH use: denies  Illicit drug use: denies  Occupation: Retired 2015, previously worked at [employer]  Living situation: Lives at home with sister  Mobility: Independent

## 2022-01-12 NOTE — H&P ADULT - PROBLEM SELECTOR PLAN 4
Tachycardic to 110s on admission, EKG with sinus tachycardia although has irregular rhythm on exam. Likely 2/2 dehydration and anemia to Hgb <7. Improved s/p IVF in ED.  - Repeat EKG in AM to r/o new a-fib/flutter

## 2022-01-12 NOTE — H&P ADULT - PROBLEM SELECTOR PLAN 5
AG to 18 on admission, likely 2/2 elevated BUN from reduced renal clearance. Did not meet sepsis criteria, warm and well-perfused on exam, unlikely lactic acidosis.  - F/u AM BMP

## 2022-01-12 NOTE — H&P ADULT - PROBLEM SELECTOR PLAN 1
Admitted for Hgb 6.6, macrocytic, requiring transfusion. Reported baseline Hgb 7-8. History & exam negative for s/s of anemic symptoms or acute blood loss. Low suspicion for GIB or hemolysis. Likely 2/2 known myelodysplasic syndrome, with possible transient worsening of myelosuppression in setting of COVID infection.  - Ordered for 1u pRBC night of 1/12  - Defer post-transfusion CBC to AM as pt is not actively bleeding  - Keep active T&S  - F/u AM B12 and folate level, TSH

## 2022-01-12 NOTE — H&P ADULT - PROBLEM SELECTOR PLAN 7
H/o MDS on Ruxolitinib (Jakafi) 20mg BID, follows with Dr. Stroud.  - Holding home Ruxolitinib for now, f/u Dr. Stroud to possibly restart in AM

## 2022-01-12 NOTE — H&P ADULT - HISTORY OF PRESENT ILLNESS
72 yo F w/ PMHx of HTN, HLD, MDS/Myelofibrosis on oral chemo (baseline Hgb 7-8, follows w/ Dr. Stroud), who presented after being sent to ED by Dr. Stroud for URI/flu-like symptoms and lethargy x 1 week since 1/6. Pt reports feeling cold / having chills, nasal congestion, sore throat, productive cough with thin mucus, generalized fatigue, decreased appetite, and heartburn.  Pt otherwise denied fever, CP, palpitations, SOB, abd pain, N/V/C/D, dysuria or hematuria, hematochezia or melena. No known COVID+ contacts, no recent travel. Covid vaccinated J+J x2.    Date of onset of symptoms: 1/6  Vaccination Status: [ ] Unvaccinated [x] 1 dose (J+J)  [ ] 2 doses [x] Booster (J+J)    ROS:   Fevers: N  Malaise: Y  Myalgias: N  SOB: N          At rest: N         With exertion: N         At baseline: N  Cough: Y         Productive: Y with thin mucus  Nausea: N  Vomiting: N  Diarrhea: N    ED vitals: T 98.7F, /79, , RR 18, SpO2 96% on RA   ED labs s/f: Hgb 6.6, creatinine 3.86  ED imaging: CXR w/ bibasilar scarring  EKG: sinus tachycardia  ED treatment: 2LNS, 1u pRBC

## 2022-01-12 NOTE — ED PROVIDER NOTE - OBJECTIVE STATEMENT
72 yo F h/o htn, MDS/Myelofibrosis on oral chemo (Hg 7-8) sent by hematologist for URI/flu like sx and lethargy x 6 days. PT endorses nasal congestion, myalgias, cough and fatigue. Covid vaccinated. Decreased PO intake. No vomiting, abd pain or diarrhea. Feels weak. No chest pain or sob. No known covid contacts. no recent travel.

## 2022-01-12 NOTE — H&P ADULT - ATTENDING COMMENTS
Patient was seen and examined in the ED. I discussed the case with the medical resident. I reviewed the medical resident's note. I agree to the physical exam, assessment and plan except as indicated below.    70 yo F w/ PMHx of HTN, HLD, MDS/Myelofibrosis on oral chemo (baseline Hgb 7-8, follows w/ Dr. Stroud), was sent to ED by Dr. Stroud for URI/flu-like symptoms and lethargy x 1 week . found to have COVID+, Hb of 6.6 and cr of 3.86     # COVID-19 pneumonia: without any oxygen requirement.     # Macrocytic anemia in the setting of Hx of MDS.    no obvious source of bleeding.   Transfuse for a goal of Hb>7.     # LUZMARIA on CKD vs CKD:  unknown baseline of Cr.   Obtain renal Ultrasound   Follow urine electrolytes.   possibly pre-renal however Pt received 2 L of NS in the ED with one unit of PRBCs.

## 2022-01-13 ENCOUNTER — TRANSCRIPTION ENCOUNTER (OUTPATIENT)
Age: 72
End: 2022-01-13

## 2022-01-13 LAB
A1C WITH ESTIMATED AVERAGE GLUCOSE RESULT: 5.4 % — SIGNIFICANT CHANGE UP (ref 4–5.6)
ANION GAP SERPL CALC-SCNC: 13 MMOL/L — SIGNIFICANT CHANGE UP (ref 5–17)
APPEARANCE UR: CLEAR — SIGNIFICANT CHANGE UP
BACTERIA # UR AUTO: PRESENT /HPF
BILIRUB UR-MCNC: NEGATIVE — SIGNIFICANT CHANGE UP
BUN SERPL-MCNC: 70 MG/DL — HIGH (ref 7–23)
CALCIUM SERPL-MCNC: 9.3 MG/DL — SIGNIFICANT CHANGE UP (ref 8.4–10.5)
CHLORIDE SERPL-SCNC: 113 MMOL/L — HIGH (ref 96–108)
CO2 SERPL-SCNC: 19 MMOL/L — LOW (ref 22–31)
COLOR SPEC: YELLOW — SIGNIFICANT CHANGE UP
CREAT ?TM UR-MCNC: 111 MG/DL — SIGNIFICANT CHANGE UP
CREAT SERPL-MCNC: 2.72 MG/DL — HIGH (ref 0.5–1.3)
CRP SERPL-MCNC: 29.5 MG/L — HIGH (ref 0–4)
D DIMER BLD IA.RAPID-MCNC: 634 NG/ML DDU — HIGH
DIFF PNL FLD: ABNORMAL
EPI CELLS # UR: SIGNIFICANT CHANGE UP /HPF (ref 0–5)
ESTIMATED AVERAGE GLUCOSE: 108 MG/DL — SIGNIFICANT CHANGE UP (ref 68–114)
FERRITIN SERPL-MCNC: 2098 NG/ML — HIGH (ref 15–150)
FOLATE SERPL-MCNC: 17.6 NG/ML — SIGNIFICANT CHANGE UP
GLUCOSE SERPL-MCNC: 106 MG/DL — HIGH (ref 70–99)
GLUCOSE UR QL: NEGATIVE — SIGNIFICANT CHANGE UP
HCT VFR BLD CALC: 21.8 % — LOW (ref 34.5–45)
HCT VFR BLD CALC: 22.5 % — LOW (ref 34.5–45)
HCT VFR BLD CALC: 26 % — LOW (ref 34.5–45)
HCV AB S/CO SERPL IA: 8.23 S/CO — HIGH
HCV AB SERPL-IMP: REACTIVE
HGB BLD-MCNC: 6.7 G/DL — CRITICAL LOW (ref 11.5–15.5)
HGB BLD-MCNC: 6.7 G/DL — CRITICAL LOW (ref 11.5–15.5)
HGB BLD-MCNC: 8 G/DL — LOW (ref 11.5–15.5)
HYALINE CASTS # UR AUTO: SIGNIFICANT CHANGE UP /LPF (ref 0–2)
KETONES UR-MCNC: NEGATIVE — SIGNIFICANT CHANGE UP
LEUKOCYTE ESTERASE UR-ACNC: NEGATIVE — SIGNIFICANT CHANGE UP
MAGNESIUM SERPL-MCNC: 2.2 MG/DL — SIGNIFICANT CHANGE UP (ref 1.6–2.6)
MCHC RBC-ENTMCNC: 29.3 PG — SIGNIFICANT CHANGE UP (ref 27–34)
MCHC RBC-ENTMCNC: 29.7 PG — SIGNIFICANT CHANGE UP (ref 27–34)
MCHC RBC-ENTMCNC: 29.8 GM/DL — LOW (ref 32–36)
MCHC RBC-ENTMCNC: 29.8 PG — SIGNIFICANT CHANGE UP (ref 27–34)
MCHC RBC-ENTMCNC: 30.7 GM/DL — LOW (ref 32–36)
MCHC RBC-ENTMCNC: 30.8 GM/DL — LOW (ref 32–36)
MCV RBC AUTO: 96.7 FL — SIGNIFICANT CHANGE UP (ref 80–100)
MCV RBC AUTO: 96.9 FL — SIGNIFICANT CHANGE UP (ref 80–100)
MCV RBC AUTO: 98.3 FL — SIGNIFICANT CHANGE UP (ref 80–100)
NITRITE UR-MCNC: NEGATIVE — SIGNIFICANT CHANGE UP
NRBC # BLD: 2 /100 WBCS — HIGH (ref 0–0)
PH UR: 6 — SIGNIFICANT CHANGE UP (ref 5–8)
PLATELET # BLD AUTO: 201 K/UL — SIGNIFICANT CHANGE UP (ref 150–400)
PLATELET # BLD AUTO: 209 K/UL — SIGNIFICANT CHANGE UP (ref 150–400)
PLATELET # BLD AUTO: 217 K/UL — SIGNIFICANT CHANGE UP (ref 150–400)
POTASSIUM SERPL-MCNC: 3.5 MMOL/L — SIGNIFICANT CHANGE UP (ref 3.5–5.3)
POTASSIUM SERPL-SCNC: 3.5 MMOL/L — SIGNIFICANT CHANGE UP (ref 3.5–5.3)
PROT UR-MCNC: 100 MG/DL
RBC # BLD: 2.25 M/UL — LOW (ref 3.8–5.2)
RBC # BLD: 2.29 M/UL — LOW (ref 3.8–5.2)
RBC # BLD: 2.69 M/UL — LOW (ref 3.8–5.2)
RBC # FLD: 21.2 % — HIGH (ref 10.3–14.5)
RBC # FLD: 22.3 % — HIGH (ref 10.3–14.5)
RBC # FLD: 22.4 % — HIGH (ref 10.3–14.5)
RBC CASTS # UR COMP ASSIST: ABNORMAL /HPF
SODIUM SERPL-SCNC: 145 MMOL/L — SIGNIFICANT CHANGE UP (ref 135–145)
SODIUM UR-SCNC: 74 MMOL/L — SIGNIFICANT CHANGE UP
SP GR SPEC: 1.02 — SIGNIFICANT CHANGE UP (ref 1–1.03)
TSH SERPL-MCNC: 3.48 UIU/ML — SIGNIFICANT CHANGE UP (ref 0.27–4.2)
UROBILINOGEN FLD QL: 0.2 E.U./DL — SIGNIFICANT CHANGE UP
VIT B12 SERPL-MCNC: 552 PG/ML — SIGNIFICANT CHANGE UP (ref 232–1245)
WBC # BLD: 4.06 K/UL — SIGNIFICANT CHANGE UP (ref 3.8–10.5)
WBC # BLD: 4.23 K/UL — SIGNIFICANT CHANGE UP (ref 3.8–10.5)
WBC # BLD: 4.94 K/UL — SIGNIFICANT CHANGE UP (ref 3.8–10.5)
WBC # FLD AUTO: 4.06 K/UL — SIGNIFICANT CHANGE UP (ref 3.8–10.5)
WBC # FLD AUTO: 4.23 K/UL — SIGNIFICANT CHANGE UP (ref 3.8–10.5)
WBC # FLD AUTO: 4.94 K/UL — SIGNIFICANT CHANGE UP (ref 3.8–10.5)
WBC UR QL: < 5 /HPF — SIGNIFICANT CHANGE UP

## 2022-01-13 PROCEDURE — 99233 SBSQ HOSP IP/OBS HIGH 50: CPT | Mod: GC

## 2022-01-13 PROCEDURE — 76775 US EXAM ABDO BACK WALL LIM: CPT | Mod: 26

## 2022-01-13 RX ORDER — SOTROVIMAB 62.5 MG/ML
500 INJECTION, SOLUTION, CONCENTRATE INTRAVENOUS ONCE
Refills: 0 | Status: DISCONTINUED | OUTPATIENT
Start: 2022-01-13 | End: 2022-01-13

## 2022-01-13 RX ORDER — SODIUM CHLORIDE 9 MG/ML
250 INJECTION INTRAMUSCULAR; INTRAVENOUS; SUBCUTANEOUS
Refills: 0 | Status: DISCONTINUED | OUTPATIENT
Start: 2022-01-13 | End: 2022-01-14

## 2022-01-13 RX ORDER — SOTROVIMAB 62.5 MG/ML
500 INJECTION, SOLUTION, CONCENTRATE INTRAVENOUS ONCE
Refills: 0 | Status: COMPLETED | OUTPATIENT
Start: 2022-01-13 | End: 2022-01-13

## 2022-01-13 RX ORDER — SODIUM CHLORIDE 9 MG/ML
250 INJECTION INTRAMUSCULAR; INTRAVENOUS; SUBCUTANEOUS
Refills: 0 | Status: DISCONTINUED | OUTPATIENT
Start: 2022-01-13 | End: 2022-01-13

## 2022-01-13 RX ORDER — LOSARTAN POTASSIUM 100 MG/1
1 TABLET, FILM COATED ORAL
Qty: 0 | Refills: 0 | DISCHARGE

## 2022-01-13 RX ADMIN — ATORVASTATIN CALCIUM 40 MILLIGRAM(S): 80 TABLET, FILM COATED ORAL at 22:08

## 2022-01-13 RX ADMIN — SOTROVIMAB 116 MILLIGRAM(S): 62.5 INJECTION, SOLUTION, CONCENTRATE INTRAVENOUS at 16:01

## 2022-01-13 RX ADMIN — AMLODIPINE BESYLATE 10 MILLIGRAM(S): 2.5 TABLET ORAL at 05:37

## 2022-01-13 RX ADMIN — Medication 650 MILLIGRAM(S): at 17:22

## 2022-01-13 RX ADMIN — SODIUM CHLORIDE 100 MILLILITER(S): 9 INJECTION INTRAMUSCULAR; INTRAVENOUS; SUBCUTANEOUS at 15:44

## 2022-01-13 RX ADMIN — Medication 650 MILLIGRAM(S): at 05:36

## 2022-01-13 NOTE — PROGRESS NOTE ADULT - PROBLEM SELECTOR PLAN 5
AG to 18 on admission, likely 2/2 elevated BUN from reduced renal clearance. Did not meet sepsis criteria, warm and well-perfused on exam, unlikely lactic acidosis.  - F/u AM BMP AG to 18 on admission, likely 2/2 elevated BUN from reduced renal clearance. Did not meet sepsis criteria, warm and well-perfused on exam, unlikely lactic acidosis.    - F/u BMP

## 2022-01-13 NOTE — DISCHARGE NOTE PROVIDER - NSDCCPCAREPLAN_GEN_ALL_CORE_FT
PRINCIPAL DISCHARGE DIAGNOSIS  Diagnosis: Anemia in neoplastic disease  Assessment and Plan of Treatment: Anemia is the medical term for when a person has too few red blood cells. Red blood cells are the cells in your blood that carry oxygen. If you have too few red blood cells, your body does not get all the oxygen it needs.   You received a blood transfusion during your hospital stay.   Most people with anemia have no symptoms. They find out they have it after their doctor does blood tests for another reason. People who do have symptoms might feel tired or weak, especially if they try to exercise or have headaches. If you experience these symptoms you should see your primary care provider for further evaluation.      SECONDARY DISCHARGE DIAGNOSES  Diagnosis: 2019 novel coronavirus disease (COVID-19)  Assessment and Plan of Treatment: COVID-19 is the disease caused by a coronavirus first discovered in December 2019. Coronaviruses generally cause upper respiratory (nose, throat, and lung) infections, such as a cold. The 2019 virus spreads quickly and easily. It can be spread starting 2 to 3 days before symptoms even begin.  DISCHARGE INSTRUCTIONS:  Call your local emergency number (911 in the ) if:  - You have trouble breathing or shortness of breath at rest.  - You have chest pain or pressure that lasts longer than 5 minutes.  - You become confused or hard to wake.  - Your lips or face are blue.  Seek care immediately if:  - You have a fever of 104°F (40°C) or higher.  Medicines you may need:  - Decongestants help reduce nasal congestion and help you breathe more easily. If you take decongestant pills, they may make you feel restless or cause problems with your sleep. Do not use decongestant sprays for more than a few days.  - Cough suppressants help reduce coughing. Ask your healthcare provider which type of cough medicine is best for you.  - Acetaminophen decreases pain and fever. It is available without a doctor's order. Ask how much to take and how often to take it. Follow directions. Read the labels of all other medicines you are using to see if they also contain acetaminophen, or ask your doctor or pharmacist. Acetaminophen can cause liver damage if not taken correctly. Do not use more than 4 grams (4,000 milligrams) total of acetaminophen in one day.  - NSAIDs, such as ibuprofen, help decrease swelling, pain, and fever. This medicine is available with or without a doctor's order. NSAIDs can cause stomach bleeding or kidney problems in certain people. If you take blood thinner medicine, always ask your healthcare provider if NSAIDs are safe for you. Always read the medicine label and follow directions.       PRINCIPAL DISCHARGE DIAGNOSIS  Diagnosis: Anemia in neoplastic disease  Assessment and Plan of Treatment: Anemia is the medical term for when a person has too few red blood cells. Red blood cells are the cells in your blood that carry oxygen. If you have too few red blood cells, your body does not get all the oxygen it needs.   You received a blood transfusion during your hospital stay.   Most people with anemia have no symptoms. They find out they have it after their doctor does blood tests for another reason. People who do have symptoms might feel tired or weak, especially if they try to exercise or have headaches. If you experience these symptoms you should see your primary care provider for further evaluation.      SECONDARY DISCHARGE DIAGNOSES  Diagnosis: 2019 novel coronavirus disease (COVID-19)  Assessment and Plan of Treatment: COVID-19 is the disease caused by a coronavirus first discovered in December 2019. Coronaviruses generally cause upper respiratory (nose, throat, and lung) infections, such as a cold. The 2019 virus spreads quickly and easily. It can be spread starting 2 to 3 days before symptoms even begin.  DISCHARGE INSTRUCTIONS:  Call your local emergency number (911 in the ) if:  - You have trouble breathing or shortness of breath at rest.  - You have chest pain or pressure that lasts longer than 5 minutes.  - You become confused or hard to wake.  - Your lips or face are blue.  Seek care immediately if:  - You have a fever of 104°F (40°C) or higher.  Medicines you may need:  - Decongestants help reduce nasal congestion and help you breathe more easily. If you take decongestant pills, they may make you feel restless or cause problems with your sleep. Do not use decongestant sprays for more than a few days.  - Cough suppressants help reduce coughing. Ask your healthcare provider which type of cough medicine is best for you.  - Acetaminophen decreases pain and fever. It is available without a doctor's order. Ask how much to take and how often to take it. Follow directions. Read the labels of all other medicines you are using to see if they also contain acetaminophen, or ask your doctor or pharmacist. Acetaminophen can cause liver damage if not taken correctly. Do not use more than 4 grams (4,000 milligrams) total of acetaminophen in one day.  - NSAIDs, such as ibuprofen, help decrease swelling, pain, and fever. This medicine is available with or without a doctor's order. NSAIDs can cause stomach bleeding or kidney problems in certain people. If you take blood thinner medicine, always ask your healthcare provider if NSAIDs are safe for you. Always read the medicine label and follow directions.      Diagnosis: Hypertension  Assessment and Plan of Treatment: Hypertension is the medical term for high blood pressure. Blood pressure refers to the pressure that blood applies to the inner walls of the arteries. Arteries carry blood from the heart to other organs and parts of the body. Untreated high blood pressure increases the strain on the heart and arteries, eventually causing organ damage. High blood pressure increases the risk of heart failure, heart attack (myocardial infarction), stroke, and kidney failure. High blood pressure does not usually cause any symptoms. Treatment of hypertension usually begins with lifestyle changes. Making these lifestyle changes involves little or no risk. Recommended changes often include reducing the amount of salt in your diet, losing weight if you are overweight or obese, avoiding drinking too much alcohol, stopping smoking and exercising at least 30 minutes per day most days of the week. If you are prescribed medication for your hypertension it is important to take these as prescribed to prevent the possible complications of uncontrolled hypertension.  Please continue taking amlodipine 10 mg once daily. Please stop taking hydrochlorothiazide and losartan. Please follow up with your appointment at [facility] clinic on Thursday January 20th to get a repeat lab.

## 2022-01-13 NOTE — DISCHARGE NOTE NURSING/CASE MANAGEMENT/SOCIAL WORK - NSDCPEFALRISK_GEN_ALL_CORE
For information on Fall & Injury Prevention, visit: [email or web address] OR  [email or web address] OR  https://www.cdc.gov/steadi/patient.html

## 2022-01-13 NOTE — DISCHARGE NOTE PROVIDER - NSDCFUSCHEDAPPT_GEN_ALL_CORE_FT
RACHEL ASHTON ; 03/10/2022 ; [facility] [address] [place] [address] RACHEL ASHTON ; 01/20/2022 ; [facility] [address]  RACHEL ASHTON ; 03/10/2022 ; [facility] [address] [facility] [address]

## 2022-01-13 NOTE — PROGRESS NOTE ADULT - PROBLEM SELECTOR PLAN 2
Presenting with BUN/creatinine 85/3.84, with baseline 28/1.52 in 5/2021. Concern for LUZMARIA on CKD stage 2. Pt denies urinary symptoms, unlikely post-obstructive LUZMARIA. Most likely pre-renal in setting of recent poor PO intake x 1 week and dehydration on exam. DDx include possible COVID ATN or worsening renal injury from known MDS.  - S/p 2LNS in the ED, reassess for additional need for IVF in AM  - Trend daily BMP, Mg, Phos to monitor for renal failure  - F/u urinalysis, urine sodium, urine creatinine  - Consider renal ultrasound and Nephrology consult if worsening  - Holding home Losartan and HCTZ Presenting with BUN/creatinine 85/3.84, with baseline 28/1.52 in 5/2021. Concern for LUZMARIA on CKD stage 2. Pt denies urinary symptoms, unlikely post-obstructive LUZMARIA. Most likely pre-renal in setting of recent poor PO intake x 1 week and dehydration on exam. DDx include possible COVID ATN or worsening renal injury from known MDS.  S/p 2LNS in the ED, reassess for additional need for IVF in AM    - Trend BMP  - Holding home losartan and HCTZ  - would order renal US and urine lytes if kidney function worsening.

## 2022-01-13 NOTE — PROGRESS NOTE ADULT - PROBLEM SELECTOR PLAN 3
URI symptoms x 1 week starting 1/6, COVID positive this admission. No hypoxemia on presentation. No COVID contacts or travel. Pt is vaccinated J+J x2.   - Obtain approval for Sotrovimab infusion in AM  - F/u inflammatory markers in AM URI symptoms x 1 week starting 1/6, COVID positive this admission. No hypoxemia on presentation. No COVID contacts or travel. Pt is vaccinated J+J x2.     - Pt will undergo treatment with Sotrovimab infusion  on 1/13  - trend inflammatory markers

## 2022-01-13 NOTE — DISCHARGE NOTE PROVIDER - NSDCMRMEDTOKEN_GEN_ALL_CORE_FT
amLODIPine 10 mg oral tablet: 1 tab(s) orally once a day  dorzolamide 2% ophthalmic solution: 1 drop(s) to each affected eye 3 times a day  erythromycin 0.5% ophthalmic ointment: 1 application to each affected eye once a day (at bedtime)  hydroCHLOROthiazide 12.5 mg oral capsule: 1 cap(s) orally once a day  latanoprost 0.005% ophthalmic solution: 1 drop(s) to each affected eye once a day (in the evening)  Lipitor 40 mg oral tablet: 1 tab(s) orally once a day  losartan 100 mg oral tablet: 1 tab(s) orally once a day  ruxolitinib 20 mg oral tablet: 1 tab(s) orally 2 times a day  sodium bicarbonate 650 mg oral tablet: 1 tab(s) orally 2 times a day   amLODIPine 10 mg oral tablet: 1 tab(s) orally once a day  dorzolamide 2% ophthalmic solution: 1 drop(s) to each affected eye 3 times a day  erythromycin 0.5% ophthalmic ointment: 1 application to each affected eye once a day (at bedtime)  latanoprost 0.005% ophthalmic solution: 1 drop(s) to each affected eye once a day (in the evening)  Lipitor 40 mg oral tablet: 1 tab(s) orally once a day  ruxolitinib 20 mg oral tablet: 1 tab(s) orally 2 times a day  sodium bicarbonate 650 mg oral tablet: 1 tab(s) orally 2 times a day

## 2022-01-13 NOTE — PROGRESS NOTE ADULT - PROBLEM SELECTOR PLAN 1
Admitted for Hgb 6.6, macrocytic, requiring transfusion. Reported baseline Hgb 7-8. History & exam negative for s/s of anemic symptoms or acute blood loss. Low suspicion for GIB or hemolysis. Likely 2/2 known myelodysplasic syndrome, with possible transient worsening of myelosuppression in setting of COVID infection.  - Ordered for 1u pRBC night of 1/12  - Defer post-transfusion CBC to AM as pt is not actively bleeding  - Keep active T&S  - F/u AM B12 and folate level, TSH Admitted for Hgb 6.6, macrocytic, requiring transfusion. Reported baseline Hgb 7-8. History & exam negative for s/s of anemic symptoms or acute blood loss. Low suspicion for GIB or hemolysis. Likely 2/2 known myelodysplasic syndrome, with possible transient worsening of myelosuppression in setting of COVID infection.  s/p 1U pRBC night of 1/12. Repeat CBC x2 showed Hgb of 6.7.    - Pt undergoing an additional 1U pRBC on 1/13/   - Keep active T&S  - F/u B12 and folate level, TSH

## 2022-01-13 NOTE — PROGRESS NOTE ADULT - PROBLEM SELECTOR PLAN 6
H/o HTN on Amlodipine 10mg daily, Losartan 100mg daily, HCTZ 12.5mg daily.  - Holding home Losartan and HCTZ for decreased renal function  - C/w Amlodipine 10mg daily H/o HTN on Amlodipine 10mg daily, Losartan 100mg daily, HCTZ 12.5mg daily.    - Holding home Losartan and HCTZ for decreased renal function  - C/w Amlodipine 10mg daily

## 2022-01-13 NOTE — DISCHARGE NOTE PROVIDER - HOSPITAL COURSE
#Discharge: do not delete  70 yo F w/ PMHx of HTN, HLD, MDS/Myelofibrosis on oral chemo (baseline Hgb 7-8, follows w/ Dr. Stroud), who presented after being sent to ED by Dr. Stroud for URI/flu-like symptoms and lethargy x 1 week since 1/6. Admitted for anemia requiring transfusion and elevated creatinine.    Problem List/Main Diagnoses (system-based):   #Anemia in neoplastic disease  Admitted for Hgb 6.6, macrocytic, requiring transfusion. Reported baseline Hgb 7-8. History & exam negative for s/s of anemic symptoms or acute blood loss. Low suspicion for GIB or hemolysis. Likely 2/2 known myelodysplasic syndrome, with possible transient worsening of myelosuppression in setting of COVID infection.  s/p 2U pRBC   Post-transfusion Hgb __.   B12 and folate level, TSH __.     #Elevated serum creatinine  Presenting with BUN/creatinine 85/3.84, with baseline 28/1.52 in 5/2021. Concern for LUZMARIA on CKD stage 2. Pt denies urinary symptoms, unlikely post-obstructive LUZMARIA. Most likely pre-renal in setting of recent poor PO intake x 1 week and dehydration on exam. DDx include possible COVID ATN or worsening renal injury from known MDS.  S/p 2LNS in the ED, reassess for additional need for IVF in AM  Held home losartan and HCTZ.    #2019 novel coronavirus disease (COVID-19)  URI symptoms x 1 week starting 1/6, COVID positive this admission. No hypoxemia on presentation. No COVID contacts or travel. Pt is vaccinated J+J x2.   Pt undergo treatment with Sotrovimab infusion  on 1/13    #Hypertension  H/o HTN on Amlodipine 10mg daily, Losartan 100mg daily, HCTZ 12.5mg daily.  Held home Losartan and HCTZ for decreased renal function  Cont. Amlodipine 10mg daily.    #MDS (myelodysplastic syndrome)  H/o MDS on Ruxolitinib (Jakafi) 20mg BID, follows with Dr. Stroud.  Held home Ruxolitinib for now, f/u Dr. Stroud to possibly restart in AM.    Inpatient treatment course: Amlodipine, Sotrovimab, pRBC, lipitor.     New medications: None    Labs to be followed outpatient: CBC    Exam to be followed outpatient: None   #Discharge: do not delete  72 yo F w/ PMHx of HTN, HLD, MDS/Myelofibrosis on oral chemo (baseline Hgb 7-8, follows w/ Dr. Stroud), who presented after being sent to ED by Dr. Stroud for URI/flu-like symptoms and lethargy x 1 week since 1/6. Admitted for anemia requiring transfusion and elevated creatinine.    Problem List/Main Diagnoses (system-based):   #Anemia in neoplastic disease  Admitted for Hgb 6.6, macrocytic, requiring transfusion. Reported baseline Hgb 7-8. History & exam negative for s/s of anemic symptoms or acute blood loss. Low suspicion for GIB or hemolysis. Likely 2/2 known myelodysplasic syndrome, with possible transient worsening of myelosuppression in setting of COVID infection.  s/p 2U pRBC   Post-transfusion Hgb 8.   B12 and folate level, TSH normal.     #Elevated serum creatinine  Presenting with BUN/creatinine 85/3.84, with baseline 28/1.52 in 5/2021. Concern for LUZMARIA on CKD stage 2. Pt denies urinary symptoms, unlikely post-obstructive LUZMARIA. Most likely pre-renal in setting of recent poor PO intake x 1 week and dehydration on exam. DDx include possible COVID ATN or worsening renal injury from known MDS.  S/p 2LNS in the ED, reassess for additional need for IVF in AM  Held home losartan and HCTZ.    #2019 novel coronavirus disease (COVID-19)  URI symptoms x 1 week starting 1/6, COVID positive this admission. No hypoxemia on presentation. No COVID contacts or travel. Pt is vaccinated J+J x2.   Pt undergo treatment with Sotrovimab infusion  on 1/13    #Hypertension  H/o HTN on Amlodipine 10mg daily, Losartan 100mg daily, HCTZ 12.5mg daily.  Held home Losartan and HCTZ for decreased renal function  Cont. Amlodipine 10mg daily.    #MDS (myelodysplastic syndrome)  H/o MDS on Ruxolitinib (Jakafi) 20mg BID, follows with Dr. Stroud.  Held home Ruxolitinib for now, f/u Dr. Stroud to possibly restart in AM.    Inpatient treatment course: Amlodipine, Sotrovimab, pRBC, lipitor.     New medications: None    Medications stopped: Losartan, HCTZ    Labs to be followed outpatient: CBC, BMP    Exam to be followed outpatient: None

## 2022-01-13 NOTE — PROGRESS NOTE ADULT - SUBJECTIVE AND OBJECTIVE BOX
*INCOMPLETE*    OVERNIGHT EVENTS:    SUBJECTIVE / INTERVAL HPI: Patient seen and examined at bedside. Denies f/c, n/v, HA, chest pain, SOB, abdominal pain, diarrhea, constipation, melena, hematochezia, hematuria, dysuria    MEDICATIONS  (STANDING):  amLODIPine   Tablet 10 milliGRAM(s) Oral daily  atorvastatin 40 milliGRAM(s) Oral at bedtime  sodium bicarbonate 650 milliGRAM(s) Oral two times a day    MEDICATIONS  (PRN):  acetaminophen     Tablet .. 650 milliGRAM(s) Oral every 6 hours PRN Temp greater or equal to 38C (100.4F), Mild Pain (1 - 3)  melatonin 3 milliGRAM(s) Oral at bedtime PRN Insomnia    Allergies    No Known Allergies    Intolerances        VITAL SIGNS:  Vital Signs Last 24 Hrs  T(C): 36.9 (2022 05:39), Max: 37.2 (2022 01:14)  T(F): 98.4 (2022 05:39), Max: 98.9 (2022 01:14)  HR: 90 (2022 05:39) (90 - 113)  BP: 120/66 (2022 05:39) (120/66 - 150/71)  BP(mean): --  RR: 18 (2022 05:39) (17 - 18)  SpO2: 95% (2022 05:39) (95% - 99%)        PHYSICAL EXAM:  General: NAD, Laying comfortably in bed  HEENT: NC/AT, anicteric sclera, MMM  Neck: supple  Cardiovascular: +S1/S2, RRR, No murmurs, rubs, gallops  Respiratory: CTA B/L, no W/R/R  Gastrointestinal: soft, NT/ND, +BSx4  Extremities: WWP, no edema, clubbing or cyanosis  Vascular: 2+ radial, DP/PT pulses B/L  Neurological: AAOx3, no focal deficits      LABS:                        6.6    4.60  )-----------( 231      ( 2022 17:00 )             21.5     01-12    141  |  105  |  85<H>  ----------------------------<  124<H>  3.7   |  18<L>  |  3.86<H>    Ca    9.7      2022 17:00    TPro  8.0  /  Alb  4.4  /  TBili  0.4  /  DBili  x   /  AST  27  /  ALT  29  /  AlkPhos  78  01-12      Urinalysis Basic - ( 2022 00:50 )    Color: Yellow / Appearance: Clear / S.020 / pH: x  Gluc: x / Ketone: NEGATIVE  / Bili: Negative / Urobili: 0.2 E.U./dL   Blood: x / Protein: 100 mg/dL / Nitrite: NEGATIVE   Leuk Esterase: NEGATIVE / RBC: 5-10 /HPF / WBC < 5 /HPF   Sq Epi: x / Non Sq Epi: 0-5 /HPF / Bacteria: Present /HPF      CAPILLARY BLOOD GLUCOSE            Urinalysis with Rflx Culture (collected 22 @ 00:50)        RADIOLOGY & ADDITIONAL TESTS: Reviewed. OVERNIGHT EVENTS: NAOE    SUBJECTIVE / INTERVAL HPI: Patient seen and examined at bedside. Pt denies any F/C, SOB/cough. Pt reports her appetite has improved. Pt reports continued URI symptoms such as sore throat, productive cough and rhinorrhea but overall improvement. Pt denies any bleeding symptoms, passed a BM this AM w/o blood. Pt denies any other new symptoms.     MEDICATIONS  (STANDING):  amLODIPine   Tablet 10 milliGRAM(s) Oral daily  atorvastatin 40 milliGRAM(s) Oral at bedtime  sodium bicarbonate 650 milliGRAM(s) Oral two times a day    MEDICATIONS  (PRN):  acetaminophen     Tablet .. 650 milliGRAM(s) Oral every 6 hours PRN Temp greater or equal to 38C (100.4F), Mild Pain (1 - 3)  melatonin 3 milliGRAM(s) Oral at bedtime PRN Insomnia    Allergies    No Known Allergies    Intolerances        VITAL SIGNS:  Vital Signs Last 24 Hrs  T(C): 36.9 (2022 05:39), Max: 37.2 (2022 01:14)  T(F): 98.4 (2022 05:39), Max: 98.9 (2022 01:14)  HR: 90 (2022 05:39) (90 - 113)  BP: 120/66 (2022 05:39) (120/66 - 150/71)  BP(mean): --  RR: 18 (2022 05:39) (17 - 18)  SpO2: 95% (2022 05:39) (95% - 99%)        PHYSICAL EXAM:  General: NAD, Laying comfortably in bed  HEENT: NC/AT, anicteric sclera, MMM  Neck: supple  Cardiovascular: +S1/S2, RRR, No murmurs, rubs, gallops  Respiratory: CTA B/L, no W/R/R  Gastrointestinal: soft, NT/ND, +BSx4, MURALI negative  Extremities: WWP, no edema, clubbing or cyanosis  Vascular: 2+ radial, DP/PT pulses B/L  Neurological: AAOx3, no focal deficits      LABS:                        6.6    4.60  )-----------( 231      ( 2022 17:00 )             21.5     01-12    141  |  105  |  85<H>  ----------------------------<  124<H>  3.7   |  18<L>  |  3.86<H>    Ca    9.7      2022 17:00    TPro  8.0  /  Alb  4.4  /  TBili  0.4  /  DBili  x   /  AST  27  /  ALT  29  /  AlkPhos  78  01-12      Urinalysis Basic - ( 2022 00:50 )    Color: Yellow / Appearance: Clear / S.020 / pH: x  Gluc: x / Ketone: NEGATIVE  / Bili: Negative / Urobili: 0.2 E.U./dL   Blood: x / Protein: 100 mg/dL / Nitrite: NEGATIVE   Leuk Esterase: NEGATIVE / RBC: 5-10 /HPF / WBC < 5 /HPF   Sq Epi: x / Non Sq Epi: 0-5 /HPF / Bacteria: Present /HPF      CAPILLARY BLOOD GLUCOSE            Urinalysis with Rflx Culture (collected 22 @ 00:50)        RADIOLOGY & ADDITIONAL TESTS: Reviewed.

## 2022-01-13 NOTE — PROGRESS NOTE ADULT - PROBLEM SELECTOR PLAN 4
Tachycardic to 110s on admission, EKG with sinus tachycardia although has irregular rhythm on exam. Likely 2/2 dehydration and anemia to Hgb <7. Improved s/p IVF in ED.  - Repeat EKG in AM to r/o new a-fib/flutter Tachycardic to 110s on admission, EKG with sinus tachycardia although has irregular rhythm on exam. Likely 2/2 dehydration and anemia to Hgb <7. Improved s/p IVF in ED.    - Repeat EKG in AM to r/o new a-fib/flutter

## 2022-01-13 NOTE — DISCHARGE NOTE PROVIDER - CARE PROVIDER_API CALL
Gerardo Tubbs)  Internal Medicine  [address] [place] [address]  Phone: [phone]  Fax: [phone]  Scheduled Appointment: 01/20/2022 05:30 PM

## 2022-01-13 NOTE — PATIENT PROFILE ADULT - FALL HARM RISK - HARM RISK INTERVENTIONS

## 2022-01-13 NOTE — DISCHARGE NOTE NURSING/CASE MANAGEMENT/SOCIAL WORK - PATIENT PORTAL LINK FT
You can access the FollowMyHealth Patient Portal offered by [facility] by registering at the following website: [email or web address]. By joining [employer]’s FollowMyHealth portal, you will also be able to view your health information using other applications (apps) compatible with our system.

## 2022-01-13 NOTE — PROGRESS NOTE ADULT - PROBLEM SELECTOR PLAN 7
H/o MDS on Ruxolitinib (Jakafi) 20mg BID, follows with Dr. Stroud.  - Holding home Ruxolitinib for now, f/u Dr. Stroud to possibly restart in AM H/o MDS on Ruxolitinib (Jakafi) 20mg BID, follows with Dr. Stroud.    - Holding home Ruxolitinib for now, f/u Dr. Stroud to possibly restart in AM

## 2022-01-14 VITALS
RESPIRATION RATE: 17 BRPM | HEART RATE: 89 BPM | OXYGEN SATURATION: 96 % | TEMPERATURE: 98 F | SYSTOLIC BLOOD PRESSURE: 134 MMHG | DIASTOLIC BLOOD PRESSURE: 77 MMHG

## 2022-01-14 PROCEDURE — 76775 US EXAM ABDO BACK WALL LIM: CPT

## 2022-01-14 PROCEDURE — 82607 VITAMIN B-12: CPT

## 2022-01-14 PROCEDURE — 36430 TRANSFUSION BLD/BLD COMPNT: CPT

## 2022-01-14 PROCEDURE — 85379 FIBRIN DEGRADATION QUANT: CPT

## 2022-01-14 PROCEDURE — 81001 URINALYSIS AUTO W/SCOPE: CPT

## 2022-01-14 PROCEDURE — 86140 C-REACTIVE PROTEIN: CPT

## 2022-01-14 PROCEDURE — 82728 ASSAY OF FERRITIN: CPT

## 2022-01-14 PROCEDURE — P9040: CPT

## 2022-01-14 PROCEDURE — 71045 X-RAY EXAM CHEST 1 VIEW: CPT

## 2022-01-14 PROCEDURE — 86901 BLOOD TYPING SEROLOGIC RH(D): CPT

## 2022-01-14 PROCEDURE — 82570 ASSAY OF URINE CREATININE: CPT

## 2022-01-14 PROCEDURE — U0005: CPT

## 2022-01-14 PROCEDURE — 82746 ASSAY OF FOLIC ACID SERUM: CPT

## 2022-01-14 PROCEDURE — 99285 EMERGENCY DEPT VISIT HI MDM: CPT

## 2022-01-14 PROCEDURE — 83036 HEMOGLOBIN GLYCOSYLATED A1C: CPT

## 2022-01-14 PROCEDURE — 86923 COMPATIBILITY TEST ELECTRIC: CPT

## 2022-01-14 PROCEDURE — 85025 COMPLETE CBC W/AUTO DIFF WBC: CPT

## 2022-01-14 PROCEDURE — 84300 ASSAY OF URINE SODIUM: CPT

## 2022-01-14 PROCEDURE — 80053 COMPREHEN METABOLIC PANEL: CPT

## 2022-01-14 PROCEDURE — 80048 BASIC METABOLIC PNL TOTAL CA: CPT

## 2022-01-14 PROCEDURE — 83735 ASSAY OF MAGNESIUM: CPT

## 2022-01-14 PROCEDURE — 99239 HOSP IP/OBS DSCHRG MGMT >30: CPT

## 2022-01-14 PROCEDURE — 86900 BLOOD TYPING SEROLOGIC ABO: CPT

## 2022-01-14 PROCEDURE — 86803 HEPATITIS C AB TEST: CPT

## 2022-01-14 PROCEDURE — 36415 COLL VENOUS BLD VENIPUNCTURE: CPT

## 2022-01-14 PROCEDURE — 85027 COMPLETE CBC AUTOMATED: CPT

## 2022-01-14 PROCEDURE — 84443 ASSAY THYROID STIM HORMONE: CPT

## 2022-01-14 PROCEDURE — 86850 RBC ANTIBODY SCREEN: CPT

## 2022-01-14 PROCEDURE — U0003: CPT

## 2022-01-14 RX ADMIN — AMLODIPINE BESYLATE 10 MILLIGRAM(S): 2.5 TABLET ORAL at 06:48

## 2022-01-14 RX ADMIN — Medication 650 MILLIGRAM(S): at 06:48

## 2022-01-14 NOTE — CHART NOTE - NSCHARTNOTEFT_GEN_A_CORE
Patient left with transport, arrived at home without a key. No one was home to answer the door so she was brought back by transport.

## 2022-01-14 NOTE — PROVIDER CONTACT NOTE (OTHER) - SITUATION
pt was sent home via ambulette but was brought back to the hospital since nobody was home to receive her.pt does not have a key to her home.

## 2022-01-15 ENCOUNTER — TRANSCRIPTION ENCOUNTER (OUTPATIENT)
Age: 72
End: 2022-01-15

## 2022-01-19 DIAGNOSIS — J12.82 PNEUMONIA DUE TO CORONAVIRUS DISEASE 2019: ICD-10-CM

## 2022-01-19 DIAGNOSIS — E86.0 DEHYDRATION: ICD-10-CM

## 2022-01-19 DIAGNOSIS — U07.1 COVID-19: ICD-10-CM

## 2022-01-19 DIAGNOSIS — D75.81 MYELOFIBROSIS: ICD-10-CM

## 2022-01-19 DIAGNOSIS — N17.9 ACUTE KIDNEY FAILURE, UNSPECIFIED: ICD-10-CM

## 2022-01-19 DIAGNOSIS — N18.2 CHRONIC KIDNEY DISEASE, STAGE 2 (MILD): ICD-10-CM

## 2022-01-19 DIAGNOSIS — D46.9 MYELODYSPLASTIC SYNDROME, UNSPECIFIED: ICD-10-CM

## 2022-01-19 DIAGNOSIS — I12.9 HYPERTENSIVE CHRONIC KIDNEY DISEASE WITH STAGE 1 THROUGH STAGE 4 CHRONIC KIDNEY DISEASE, OR UNSPECIFIED CHRONIC KIDNEY DISEASE: ICD-10-CM

## 2022-01-20 ENCOUNTER — APPOINTMENT (OUTPATIENT)
Dept: INTERNAL MEDICINE | Facility: CLINIC | Age: 72
End: 2022-01-20
Payer: MEDICARE

## 2022-01-20 PROCEDURE — 99442: CPT | Mod: CS,95

## 2022-01-20 NOTE — END OF VISIT
[FreeTextEntry3] : 72 yo female with hx HTN, HPL, CKD, myelodysplastic syndrome, for TTM for hosp d/c for SOB found to have worsening anemia hgb 6 and covid.  \par \par 1) anemia - multifactorial 2/2 MDS in setting myelosuppression due to covid, hgb on d/c 8.  no s/s active bleeding. Has appt with heme in 2 weeks. \par 2) covid - improving but still with some fatigue\par 3) LUZMARIA - on CKD, improved on d/c, repeat at next visit and f/u renal. [Time Spent: ___ minutes] : I have spent [unfilled] minutes of time on the encounter.
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
## 2022-01-20 NOTE — PLAN
[FreeTextEntry1] : #Covid \par No hypoxemia. Pt is vaccinated J+J x2.\par Pt undergo treatment with Sotrovimab infusion on 1/13, denies co, sob, fever\par -ntd at this time \par \par #Anemia\par likely 2/2 to myelodysplastic syndrome and CKD (b12, folate, tsh wnl)\par -currently asymptomatic. follows w Dr Coronado (next apt scheduled for 2/3/2022) and renal\par -repeat cbc at next visit \par \par #Elevated serum creatinine\par -encourage PO intake\par -repeat BMP at next visit \par \par F/u 4 months: can further discuss GOC and signing MOLST from prior visit

## 2022-03-03 LAB
ANION GAP SERPL CALC-SCNC: 12 MMOL/L
BUN SERPL-MCNC: 26 MG/DL
CALCIUM SERPL-MCNC: 9.2 MG/DL
CALCIUM SERPL-MCNC: 9.2 MG/DL
CHLORIDE SERPL-SCNC: 105 MMOL/L
CO2 SERPL-SCNC: 24 MMOL/L
CREAT SERPL-MCNC: 1.38 MG/DL
EGFR: 41 ML/MIN/1.73M2
GLUCOSE SERPL-MCNC: 82 MG/DL
PARATHYROID HORMONE INTACT: 144 PG/ML
PHOSPHATE SERPL-MCNC: 3.9 MG/DL
POTASSIUM SERPL-SCNC: 4.7 MMOL/L
SARS-COV-2 N GENE NPH QL NAA+PROBE: NOT DETECTED
SODIUM SERPL-SCNC: 141 MMOL/L

## 2022-03-10 ENCOUNTER — APPOINTMENT (OUTPATIENT)
Dept: NEPHROLOGY | Facility: CLINIC | Age: 72
End: 2022-03-10
Payer: MEDICARE

## 2022-03-10 VITALS
HEIGHT: 66 IN | DIASTOLIC BLOOD PRESSURE: 63 MMHG | WEIGHT: 141 LBS | BODY MASS INDEX: 22.66 KG/M2 | SYSTOLIC BLOOD PRESSURE: 128 MMHG | HEART RATE: 72 BPM

## 2022-03-10 DIAGNOSIS — L03.90 CELLULITIS, UNSPECIFIED: ICD-10-CM

## 2022-03-10 DIAGNOSIS — R76.8 OTHER SPECIFIED ABNORMAL IMMUNOLOGICAL FINDINGS IN SERUM: ICD-10-CM

## 2022-03-10 DIAGNOSIS — Z86.19 PERSONAL HISTORY OF OTHER INFECTIOUS AND PARASITIC DISEASES: ICD-10-CM

## 2022-03-10 DIAGNOSIS — U07.1 COVID-19: ICD-10-CM

## 2022-03-10 DIAGNOSIS — R25.1 TREMOR, UNSPECIFIED: ICD-10-CM

## 2022-03-10 PROBLEM — I10 ESSENTIAL (PRIMARY) HYPERTENSION: Chronic | Status: ACTIVE | Noted: 2022-01-12

## 2022-03-10 PROBLEM — D46.9 MYELODYSPLASTIC SYNDROME, UNSPECIFIED: Chronic | Status: ACTIVE | Noted: 2022-01-12

## 2022-03-10 PROCEDURE — 99214 OFFICE O/P EST MOD 30 MIN: CPT | Mod: CS

## 2022-03-10 NOTE — CONSULT LETTER
[Dear  ___] : Dear  [unfilled], [Consult Letter:] : I had the pleasure of evaluating your patient, [unfilled]. [Please see my note below.] : Please see my note below. [Consult Closing:] : Thank you very much for allowing me to participate in the care of this patient.  If you have any questions, please do not hesitate to contact me. [Sincerely,] : Sincerely, [FreeTextEntry2] : Dr graham [FreeTextEntry3] : Sincerely, \par \par Nahum Mcknight MD, FACP
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
## 2022-03-15 ENCOUNTER — OUTPATIENT (OUTPATIENT)
Dept: OUTPATIENT SERVICES | Facility: HOSPITAL | Age: 72
LOS: 1 days | End: 2022-03-15
Payer: MEDICARE

## 2022-03-15 ENCOUNTER — APPOINTMENT (OUTPATIENT)
Age: 72
End: 2022-03-15

## 2022-03-15 DIAGNOSIS — D64.9 ANEMIA, UNSPECIFIED: ICD-10-CM
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
## 2022-03-15 PROCEDURE — 36415 COLL VENOUS BLD VENIPUNCTURE: CPT

## 2022-03-15 PROCEDURE — 86900 BLOOD TYPING SEROLOGIC ABO: CPT

## 2022-03-15 PROCEDURE — 86850 RBC ANTIBODY SCREEN: CPT

## 2022-03-15 PROCEDURE — 86901 BLOOD TYPING SEROLOGIC RH(D): CPT

## 2022-03-15 PROCEDURE — 86923 COMPATIBILITY TEST ELECTRIC: CPT

## 2022-03-16 ENCOUNTER — OUTPATIENT (OUTPATIENT)
Dept: OUTPATIENT SERVICES | Facility: HOSPITAL | Age: 72
LOS: 1 days | End: 2022-03-16
Payer: MEDICARE

## 2022-03-16 ENCOUNTER — APPOINTMENT (OUTPATIENT)
Age: 72
End: 2022-03-16

## 2022-03-16 VITALS
OXYGEN SATURATION: 99 % | WEIGHT: 136.91 LBS | SYSTOLIC BLOOD PRESSURE: 127 MMHG | HEIGHT: 66 IN | DIASTOLIC BLOOD PRESSURE: 69 MMHG | HEART RATE: 77 BPM | TEMPERATURE: 97 F | RESPIRATION RATE: 18 BRPM
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
## 2022-03-16 DIAGNOSIS — D64.9 ANEMIA, UNSPECIFIED: ICD-10-CM

## 2022-03-16 PROCEDURE — P9040: CPT

## 2022-03-16 PROCEDURE — 36430 TRANSFUSION BLD/BLD COMPNT: CPT

## 2022-03-16 PROCEDURE — 86923 COMPATIBILITY TEST ELECTRIC: CPT

## 2022-05-12 ENCOUNTER — RX RENEWAL (OUTPATIENT)
Age: 72
End: 2022-05-12

## 2022-06-13 ENCOUNTER — APPOINTMENT (OUTPATIENT)
Dept: NEPHROLOGY | Facility: CLINIC | Age: 72
End: 2022-06-13
Payer: MEDICARE

## 2022-06-13 ENCOUNTER — LABORATORY RESULT (OUTPATIENT)
Age: 72
End: 2022-06-13

## 2022-06-13 ENCOUNTER — APPOINTMENT (OUTPATIENT)
Dept: INTERNAL MEDICINE | Facility: CLINIC | Age: 72
End: 2022-06-13
Payer: MEDICARE

## 2022-06-13 VITALS
BODY MASS INDEX: 23.66 KG/M2 | HEART RATE: 89 BPM | DIASTOLIC BLOOD PRESSURE: 65 MMHG | WEIGHT: 142 LBS | HEIGHT: 65 IN | SYSTOLIC BLOOD PRESSURE: 124 MMHG | OXYGEN SATURATION: 98 %

## 2022-06-13 VITALS
DIASTOLIC BLOOD PRESSURE: 62 MMHG | OXYGEN SATURATION: 100 % | HEART RATE: 67 BPM | TEMPERATURE: 98.1 F | SYSTOLIC BLOOD PRESSURE: 134 MMHG

## 2022-06-13 PROCEDURE — 99213 OFFICE O/P EST LOW 20 MIN: CPT | Mod: 25,GC

## 2022-06-13 PROCEDURE — 36415 COLL VENOUS BLD VENIPUNCTURE: CPT

## 2022-06-13 PROCEDURE — 99214 OFFICE O/P EST MOD 30 MIN: CPT
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
## 2022-06-13 NOTE — ASSESSMENT
[FreeTextEntry1] : 71-year-old woman with a history of hypertension, which is well controlled on 3 drugs.  Her CKD 3 has stabilized and I have asked her to have labs done today to include BMP, Cystatin C, PTH.  I suspect she may need calcitriol, depending in part on what her serum calcium is.  She will return in 4 to 5 months.

## 2022-06-14 ENCOUNTER — EMERGENCY (EMERGENCY)
Facility: HOSPITAL | Age: 72
LOS: 1 days | Discharge: HOME | End: 2022-06-14
Admitting: EMERGENCY MEDICINE
Payer: MEDICARE

## 2022-06-14 VITALS
HEIGHT: 66 IN | WEIGHT: 139.99 LBS | RESPIRATION RATE: 18 BRPM | SYSTOLIC BLOOD PRESSURE: 120 MMHG | TEMPERATURE: 98 F | OXYGEN SATURATION: 99 % | HEART RATE: 81 BPM | DIASTOLIC BLOOD PRESSURE: 61 MMHG

## 2022-06-14 DIAGNOSIS — M79.672 PAIN IN LEFT FOOT: ICD-10-CM
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
## 2022-06-14 DIAGNOSIS — D75.81 MYELOFIBROSIS: ICD-10-CM

## 2022-06-14 DIAGNOSIS — I10 ESSENTIAL (PRIMARY) HYPERTENSION: ICD-10-CM

## 2022-06-14 PROCEDURE — 73630 X-RAY EXAM OF FOOT: CPT | Mod: 26,LT

## 2022-06-14 PROCEDURE — 99283 EMERGENCY DEPT VISIT LOW MDM: CPT

## 2022-06-14 PROCEDURE — 73630 X-RAY EXAM OF FOOT: CPT

## 2022-06-14 NOTE — ED PROVIDER NOTE - PATIENT PORTAL LINK FT
You can access the FollowMyHealth Patient Portal offered by [facility] by registering at the following website: [email or web address]. By joining [employer]’s FollowMyHealth portal, you will also be able to view your health information using other applications (apps) compatible with our system.

## 2022-06-14 NOTE — ED PROVIDER NOTE - NSFOLLOWUPINSTRUCTIONS_ED_ALL_ED_FT
Foot Pain      Many things can cause foot pain. Some common causes are:  •An injury.      •A sprain.      •Arthritis.      •Blisters.      •Bunions.        Follow these instructions at home:      Managing pain, stiffness, and swelling   Bag of ice on a towel on the skin.   If directed, put ice on the painful area:  •Put ice in a plastic bag.      •Place a towel between your skin and the bag.      •Leave the ice on for 20 minutes, 2–3 times a day.      Activity     • Do not stand or walk for long periods.      •Return to your normal activities as told by your health care provider. Ask your health care provider what activities are safe for you.      •Do stretches to relieve foot pain and stiffness as told by your health care provider.      • Do not lift anything that is heavier than 10 lb (4.5 kg), or the limit that you are told, until your health care provider says that it is safe. Lifting a lot of weight can put added pressure on your feet.      Lifestyle     •Wear comfortable, supportive shoes that fit you well. Do not wear high heels.      •Keep your feet clean and dry.      General instructions     •Take over-the-counter and prescription medicines only as told by your health care provider.      •Rub your foot gently.      •Pay attention to any changes in your symptoms.      •Keep all follow-up visits as told by your health care provider. This is important.        Contact a health care provider if:    •Your pain does not get better after a few days of self-care.      •Your pain gets worse.      •You cannot stand on your foot.        Get help right away if:    •Your foot is numb or tingling.      •Your foot or toes are swollen.      •Your foot or toes turn white or blue.      •You have warmth and redness along your foot.        Summary    •Common causes of foot pain are injury, sprain, arthritis, blisters, or bunions.      •Ice, medicines, and comfortable shoes may help foot pain.      •Contact your health care provider if your pain does not get better after a few days of self-care.      This information is not intended to replace advice given to you by your health care provider. Make sure you discuss any questions you have with your health care provider.

## 2022-06-14 NOTE — ED PROVIDER NOTE - MUSCULOSKELETAL, MLM
left foot +mild tenderness to left heel area with no erythema, no wounds, no tenderness in the area of the achilles, dorsiflexion and plantar flexion intact.  DP/PT 2+

## 2022-06-14 NOTE — ED PROVIDER NOTE - OBJECTIVE STATEMENT
72 y/o with pmhx myelofibrosis, HTN p/w Left heel pain X5 days. Pt reports pain first started when she was [place] the city with [place]. Pt tried tylenol and icing her foot last night w/no relief. Pain worse with walking, better with rest. Denies numbness/tingling, loss of sensation, trauma.

## 2022-06-14 NOTE — ED PROVIDER NOTE - CLINICAL SUMMARY MEDICAL DECISION MAKING FREE TEXT BOX
70 y/o f presents c/o atraumatic left heel pain x 4 days; ext NVI, pt ambulatory in ED, xray neg.  Will d/c on naproxen for 1 week, f/u podiatry

## 2022-06-14 NOTE — ED ADULT NURSE NOTE - OBJECTIVE STATEMENT
.  71years female alert mental state (AOX3) received on foot.  -complain of Lt heel pain and swelling over three days.  Hx of HTN.   -denied chest pain, SOB, dizziness, headache, trauma, back pain.  Pt is in the chair comfortably at this time. Will continue to monitor and document any changes. .  71years female alert mental state (AOX3) received on foot.  -complain of Lt foot pain and swelling over three days.  Hx of HTN. pt states that Lt foot pain is worse when she move her foot.   hot sense of Lt foot. tenderness of Lt foot.   -denied chest pain, SOB, dizziness, headache, trauma, back pain, fever, chills, myalgia.  Pt is in the chair comfortably at this time. Will continue to monitor and document any changes.

## 2022-06-16 ENCOUNTER — NON-APPOINTMENT (OUTPATIENT)
Age: 72
End: 2022-06-16

## 2022-06-16 LAB
ANION GAP SERPL CALC-SCNC: 14 MMOL/L
APPEARANCE: ABNORMAL
BACTERIA: ABNORMAL
BASOPHILS # BLD AUTO: 0.21 K/UL
BASOPHILS NFR BLD AUTO: 3 %
BILIRUBIN URINE: NEGATIVE
BLOOD URINE: NEGATIVE
BUN SERPL-MCNC: 28 MG/DL
CALCIUM SERPL-MCNC: 9.9 MG/DL
CALCIUM SERPL-MCNC: 9.9 MG/DL
CHLORIDE SERPL-SCNC: 103 MMOL/L
CHOLEST SERPL-MCNC: 99 MG/DL
CHOLEST/HDLC SERPL: 3.9 RATIO
CO2 SERPL-SCNC: 24 MMOL/L
COLOR: YELLOW
CREAT SERPL-MCNC: 1.41 MG/DL
CREAT SPEC-SCNC: 102 MG/DL
CYSTATIN C SERPL-MCNC: 2.05 MG/L
EGFR: 40 ML/MIN/1.73M2
EOSINOPHIL # BLD AUTO: 0 K/UL
EOSINOPHIL NFR BLD AUTO: 0 %
ESTIMATED AVERAGE GLUCOSE: 97 MG/DL
GFR/BSA.PRED SERPLBLD CYS-BASED-ARV: 27 ML/MIN/1.73M2
GLUCOSE QUALITATIVE U: NEGATIVE
GLUCOSE SERPL-MCNC: 86 MG/DL
HBA1C MFR BLD HPLC: 5 %
HCT VFR BLD CALC: 30.9 %
HDLC SERPL-MCNC: 26 MG/DL
HGB BLD-MCNC: 7.7 G/DL
HYALINE CASTS: 0 /LPF
KETONES URINE: NEGATIVE
LDLC SERPL CALC-MCNC: 48 MG/DL
LEUKOCYTE ESTERASE URINE: NEGATIVE
LYMPHOCYTES # BLD AUTO: 1.35 K/UL
LYMPHOCYTES NFR BLD AUTO: 19 %
MAN DIFF?: NORMAL
MCHC RBC-ENTMCNC: 24.9 GM/DL
MCHC RBC-ENTMCNC: 30.9 PG
MCV RBC AUTO: 124.1 FL
MICROALBUMIN 24H UR DL<=1MG/L-MCNC: 4.3 MG/DL
MICROALBUMIN/CREAT 24H UR-RTO: 42 MG/G
MICROSCOPIC-UA: NORMAL
MONOCYTES # BLD AUTO: 0.14 K/UL
MONOCYTES NFR BLD AUTO: 2 %
NEUTROPHILS # BLD AUTO: 4.18 K/UL
NEUTROPHILS NFR BLD AUTO: 46 %
NITRITE URINE: POSITIVE
NONHDLC SERPL-MCNC: 74 MG/DL
PARATHYROID HORMONE INTACT: 76 PG/ML
PH URINE: 8.5
PLATELET # BLD AUTO: 380 K/UL
POTASSIUM SERPL-SCNC: 5.2 MMOL/L
PROTEIN URINE: NORMAL
RBC # BLD: 2.49 M/UL
RBC # FLD: 20.8 %
RED BLOOD CELLS URINE: 1 /HPF
SODIUM SERPL-SCNC: 141 MMOL/L
SPECIFIC GRAVITY URINE: 1.02
SQUAMOUS EPITHELIAL CELLS: 0 /HPF
TRIGL SERPL-MCNC: 127 MG/DL
UROBILINOGEN URINE: NORMAL
WBC # FLD AUTO: 7.08 K/UL
WHITE BLOOD CELLS URINE: 0 /HPF

## 2022-08-10 ENCOUNTER — RX RENEWAL (OUTPATIENT)
Age: 72
End: 2022-08-10

## 2022-11-10 ENCOUNTER — APPOINTMENT (OUTPATIENT)
Dept: NEPHROLOGY | Facility: CLINIC | Age: 72
End: 2022-11-10

## 2022-11-10 VITALS
WEIGHT: 142 LBS | HEART RATE: 72 BPM | DIASTOLIC BLOOD PRESSURE: 65 MMHG | HEIGHT: 65 IN | BODY MASS INDEX: 23.66 KG/M2 | SYSTOLIC BLOOD PRESSURE: 128 MMHG

## 2022-11-10 LAB
CYSTATIN C SERPL-MCNC: 1.99 MG/L
GFR/BSA.PRED SERPLBLD CYS-BASED-ARV: 28 ML/MIN/1.73M2
POTASSIUM SERPL-SCNC: 4 MMOL/L

## 2022-11-10 PROCEDURE — 99214 OFFICE O/P EST MOD 30 MIN: CPT

## 2022-11-10 NOTE — ASSESSMENT
[FreeTextEntry1] : 72-year-old woman with controlled hypertension on 3 agents, stable CKD -but with the caveat that her GFR is considerably lower by Cystatin C.  I am renewing her sodium bicarbonate prescription.  I have ordered labs to be done today, to include BMP, Cystatin C, plasma potassium, PTH, H&H.

## 2022-11-10 NOTE — CONSULT LETTER
[Dear  ___] : Dear  [unfilled], [Consult Letter:] : I had the pleasure of evaluating your patient, [unfilled]. [Please see my note below.] : Please see my note below. [Consult Closing:] : Thank you very much for allowing me to participate in the care of this patient.  If you have any questions, please do not hesitate to contact me. [Sincerely,] : Sincerely, [FreeTextEntry2] : Dr Stroud [FreeTextEntry3] : Sincerely, \par \par Nahum Mcknight MD, FACP\par
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
## 2022-11-14 ENCOUNTER — NON-APPOINTMENT (OUTPATIENT)
Age: 72
End: 2022-11-14

## 2022-11-15 LAB
ANION GAP SERPL CALC-SCNC: 15 MMOL/L
BUN SERPL-MCNC: 36 MG/DL
CALCIUM SERPL-MCNC: 9.3 MG/DL
CALCIUM SERPL-MCNC: 9.3 MG/DL
CHLORIDE SERPL-SCNC: 110 MMOL/L
CO2 SERPL-SCNC: 18 MMOL/L
CREAT SERPL-MCNC: 1.57 MG/DL
EGFR: 35 ML/MIN/1.73M2
GLUCOSE SERPL-MCNC: 96 MG/DL
HCT VFR BLD CALC: 24.3 %
HGB BLD-MCNC: 7.3 G/DL
PARATHYROID HORMONE INTACT: 111 PG/ML
POTASSIUM SERPL-SCNC: 4.3 MMOL/L
SODIUM SERPL-SCNC: 143 MMOL/L

## 2022-12-07 ENCOUNTER — TRANSCRIPTION ENCOUNTER (OUTPATIENT)
Age: 72
End: 2022-12-07

## 2022-12-08 ENCOUNTER — RX RENEWAL (OUTPATIENT)
Age: 72
End: 2022-12-08

## 2022-12-12 ENCOUNTER — APPOINTMENT (OUTPATIENT)
Dept: HEART AND VASCULAR | Facility: CLINIC | Age: 72
End: 2022-12-12

## 2022-12-16 ENCOUNTER — APPOINTMENT (OUTPATIENT)
Dept: HEART AND VASCULAR | Facility: CLINIC | Age: 72
End: 2022-12-16

## 2022-12-16 VITALS
DIASTOLIC BLOOD PRESSURE: 56 MMHG | TEMPERATURE: 97.9 F | WEIGHT: 140 LBS | HEIGHT: 65 IN | BODY MASS INDEX: 23.32 KG/M2 | OXYGEN SATURATION: 100 % | SYSTOLIC BLOOD PRESSURE: 132 MMHG | HEART RATE: 72 BPM

## 2022-12-16 DIAGNOSIS — R07.89 OTHER CHEST PAIN: ICD-10-CM

## 2022-12-16 PROCEDURE — 93000 ELECTROCARDIOGRAM COMPLETE: CPT

## 2022-12-16 PROCEDURE — 99214 OFFICE O/P EST MOD 30 MIN: CPT

## 2022-12-16 RX ORDER — MULTIVIT-MIN/FOLIC/VIT K/LYCOP 400-300MCG
25 MCG TABLET ORAL
Qty: 90 | Refills: 0 | Status: COMPLETED | COMMUNITY
Start: 2019-10-07 | End: 2022-12-16

## 2022-12-16 RX ORDER — SODIUM BICARBONATE 650 MG/1
650 TABLET ORAL
Qty: 360 | Refills: 2 | Status: COMPLETED | COMMUNITY
Start: 2021-11-10 | End: 2022-12-16
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
## 2022-12-16 NOTE — DISCUSSION/SUMMARY
[FreeTextEntry1] : Ms. Swanson is a 73yo W with HTN, HLD, CKD, Anemia 2/2 myelofibrosis on ruxolitinib who presents to establish care. \par \par CVD Risk: PAGAN and Atypical / Non Anginal chest discomfort. EKG NSR 71 bpm. Will send for echocardiogram to assess structure and NST to rule out CAD. \par HTN: Goal < 140/80. Controlled. Continue with Amlodipine 10mg, HCTZ 12.5mg, Losartan 100mg\par HLD: Goal <100. Controlled at  LDL 48 (6/2022) Continue with Atorvastatin 40mg\par CKD: Bun / Cr 36/1.57 with GFR 35 (11/2022)\par Anemia: Last H/H 7.3/24.3 (11/2022) Continued follow up with oncologist. \par \par Will follow up at [address] [place] [address]. [EKG obtained to assist in diagnosis and management of assessed problem(s)] : EKG obtained to assist in diagnosis and management of assessed problem(s)

## 2022-12-16 NOTE — REVIEW OF SYSTEMS
[Dyspnea on exertion] : dyspnea during exertion [Chest Discomfort] : chest discomfort [Heartburn] : heartburn [Fever] : no fever [Chills] : no chills [SOB] : no shortness of breath [Lower Ext Edema] : no extremity edema [Palpitations] : no palpitations [Orthopnea] : no orthopnea [PND] : no PND [Cough] : no cough [Nausea] : no nausea [Vomiting] : no vomiting [Diarrhea] : diarrhea [Dizziness] : no dizziness [Numbness (Hypoesthesia)] : no numbness [Weakness] : no weakness [Speech Disturbance] : no speech disturbance [Easy Bleeding] : no tendency for easy bleeding

## 2022-12-16 NOTE — PHYSICAL EXAM
[Well Developed] : well developed [Well Nourished] : well nourished [No Carotid Bruit] : no carotid bruit [Normal S1, S2] : normal S1, S2 [Clear Lung Fields] : clear lung fields [Good Air Entry] : good air entry [No Respiratory Distress] : no respiratory distress  [Normal Gait] : normal gait [Moves all extremities] : moves all extremities [No Focal Deficits] : no focal deficits [Normal Speech] : normal speech [Alert and Oriented] : alert and oriented [Normal memory] : normal memory [No Edema] : no edema [de-identified] : 2/6 systolic murmur

## 2022-12-16 NOTE — END OF VISIT
[FreeTextEntry3] : Patient seen and examined. Case discussed with PA. Agree with detailed plan above.

## 2022-12-16 NOTE — CARDIOLOGY SUMMARY
[de-identified] : \par 12/16/22 EKG:NSR 71 bpm [de-identified] : \par 1/10/2020 TTE: normal LV/RV size/fxn, severely dilated LA, mild TR, no pericardial effusion

## 2022-12-27 ENCOUNTER — APPOINTMENT (OUTPATIENT)
Dept: INTERNAL MEDICINE | Facility: CLINIC | Age: 72
End: 2022-12-27
Payer: MEDICARE

## 2022-12-27 VITALS
BODY MASS INDEX: 23.66 KG/M2 | HEART RATE: 73 BPM | SYSTOLIC BLOOD PRESSURE: 124 MMHG | DIASTOLIC BLOOD PRESSURE: 70 MMHG | WEIGHT: 142 LBS | OXYGEN SATURATION: 100 % | HEIGHT: 65 IN | TEMPERATURE: 98 F

## 2022-12-27 DIAGNOSIS — R01.1 CARDIAC MURMUR, UNSPECIFIED: ICD-10-CM

## 2022-12-27 DIAGNOSIS — E21.3 HYPERPARATHYROIDISM, UNSPECIFIED: ICD-10-CM

## 2022-12-27 PROCEDURE — 99397 PER PM REEVAL EST PAT 65+ YR: CPT | Mod: GC,GY

## 2023-01-03 ENCOUNTER — APPOINTMENT (OUTPATIENT)
Dept: HEART AND VASCULAR | Facility: CLINIC | Age: 73
End: 2023-01-03
Payer: MEDICARE

## 2023-01-03 ENCOUNTER — INPATIENT (INPATIENT)
Facility: HOSPITAL | Age: 73
LOS: 1 days | Discharge: HOME | DRG: 287 | End: 2023-01-05
Attending: INTERNAL MEDICINE | Admitting: INTERNAL MEDICINE
Payer: MEDICARE

## 2023-01-03 ENCOUNTER — NON-APPOINTMENT (OUTPATIENT)
Age: 73
End: 2023-01-03

## 2023-01-03 VITALS
OXYGEN SATURATION: 99 % | HEART RATE: 106 BPM | HEIGHT: 65 IN | WEIGHT: 141.1 LBS | SYSTOLIC BLOOD PRESSURE: 150 MMHG | DIASTOLIC BLOOD PRESSURE: 71 MMHG | TEMPERATURE: 98 F | RESPIRATION RATE: 18 BRPM

## 2023-01-03 VITALS — HEIGHT: 65 IN | WEIGHT: 142 LBS | BODY MASS INDEX: 23.66 KG/M2

## 2023-01-03 DIAGNOSIS — D63.8 ANEMIA IN OTHER CHRONIC DISEASES CLASSIFIED ELSEWHERE: ICD-10-CM

## 2023-01-03 DIAGNOSIS — N18.30 CHRONIC KIDNEY DISEASE, STAGE 3 UNSPECIFIED: ICD-10-CM

## 2023-01-03 DIAGNOSIS — I10 ESSENTIAL (PRIMARY) HYPERTENSION: ICD-10-CM

## 2023-01-03 DIAGNOSIS — R94.39 ABNORMAL RESULT OF OTHER CARDIOVASCULAR FUNCTION STUDY: ICD-10-CM
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
## 2023-01-03 LAB
ALBUMIN SERPL ELPH-MCNC: 4.9 G/DL — SIGNIFICANT CHANGE UP (ref 3.3–5)
ALP SERPL-CCNC: 90 U/L — SIGNIFICANT CHANGE UP (ref 40–120)
ALT FLD-CCNC: 30 U/L — SIGNIFICANT CHANGE UP (ref 10–45)
ANION GAP SERPL CALC-SCNC: 11 MMOL/L — SIGNIFICANT CHANGE UP (ref 5–17)
ANISOCYTOSIS BLD QL: SIGNIFICANT CHANGE UP
APTT BLD: 32.4 SEC — SIGNIFICANT CHANGE UP (ref 27.5–35.5)
AST SERPL-CCNC: 25 U/L — SIGNIFICANT CHANGE UP (ref 10–40)
BASOPHILS # BLD AUTO: 0.17 K/UL — SIGNIFICANT CHANGE UP (ref 0–0.2)
BASOPHILS NFR BLD AUTO: 2.7 % — HIGH (ref 0–2)
BILIRUB SERPL-MCNC: 0.4 MG/DL — SIGNIFICANT CHANGE UP (ref 0.2–1.2)
BLD GP AB SCN SERPL QL: NEGATIVE — SIGNIFICANT CHANGE UP
BUN SERPL-MCNC: 32 MG/DL — HIGH (ref 7–23)
CALCIUM SERPL-MCNC: 9.5 MG/DL — SIGNIFICANT CHANGE UP (ref 8.4–10.5)
CHLORIDE SERPL-SCNC: 105 MMOL/L — SIGNIFICANT CHANGE UP (ref 96–108)
CO2 SERPL-SCNC: 24 MMOL/L — SIGNIFICANT CHANGE UP (ref 22–31)
CREAT SERPL-MCNC: 1.41 MG/DL — HIGH (ref 0.5–1.3)
DACRYOCYTES BLD QL SMEAR: SIGNIFICANT CHANGE UP
EGFR: 39 ML/MIN/1.73M2 — LOW
EOSINOPHIL # BLD AUTO: 0 K/UL — SIGNIFICANT CHANGE UP (ref 0–0.5)
EOSINOPHIL NFR BLD AUTO: 0 % — SIGNIFICANT CHANGE UP (ref 0–6)
GIANT PLATELETS BLD QL SMEAR: PRESENT — SIGNIFICANT CHANGE UP
GLUCOSE SERPL-MCNC: 97 MG/DL — SIGNIFICANT CHANGE UP (ref 70–99)
HCT VFR BLD CALC: 24 % — LOW (ref 34.5–45)
HGB BLD-MCNC: 7.5 G/DL — LOW (ref 11.5–15.5)
INR BLD: 1.15 — SIGNIFICANT CHANGE UP (ref 0.88–1.16)
LYMPHOCYTES # BLD AUTO: 2.08 K/UL — SIGNIFICANT CHANGE UP (ref 1–3.3)
LYMPHOCYTES # BLD AUTO: 32.7 % — SIGNIFICANT CHANGE UP (ref 13–44)
MACROCYTES BLD QL: SIGNIFICANT CHANGE UP
MANUAL SMEAR VERIFICATION: SIGNIFICANT CHANGE UP
MCHC RBC-ENTMCNC: 31.3 GM/DL — LOW (ref 32–36)
MCHC RBC-ENTMCNC: 32.1 PG — SIGNIFICANT CHANGE UP (ref 27–34)
MCV RBC AUTO: 102.6 FL — HIGH (ref 80–100)
METAMYELOCYTES # FLD: 3.6 % — HIGH (ref 0–0)
MICROCYTES BLD QL: SLIGHT — SIGNIFICANT CHANGE UP
MONOCYTES # BLD AUTO: 0.29 K/UL — SIGNIFICANT CHANGE UP (ref 0–0.9)
MONOCYTES NFR BLD AUTO: 4.6 % — SIGNIFICANT CHANGE UP (ref 2–14)
MYELOCYTES NFR BLD: 5.5 % — HIGH (ref 0–0)
NEUTROPHILS # BLD AUTO: 3.23 K/UL — SIGNIFICANT CHANGE UP (ref 1.8–7.4)
NEUTROPHILS NFR BLD AUTO: 47.3 % — SIGNIFICANT CHANGE UP (ref 43–77)
NEUTS BAND # BLD: 3.6 % — SIGNIFICANT CHANGE UP (ref 0–8)
NRBC # BLD: 10 /100 — HIGH (ref 0–0)
NRBC # BLD: SIGNIFICANT CHANGE UP /100 WBCS (ref 0–0)
OVALOCYTES BLD QL SMEAR: SIGNIFICANT CHANGE UP
PLAT MORPH BLD: ABNORMAL
PLATELET # BLD AUTO: 286 K/UL — SIGNIFICANT CHANGE UP (ref 150–400)
POIKILOCYTOSIS BLD QL AUTO: SIGNIFICANT CHANGE UP
POLYCHROMASIA BLD QL SMEAR: SLIGHT — SIGNIFICANT CHANGE UP
POTASSIUM SERPL-MCNC: 4.8 MMOL/L — SIGNIFICANT CHANGE UP (ref 3.5–5.3)
POTASSIUM SERPL-SCNC: 4.8 MMOL/L — SIGNIFICANT CHANGE UP (ref 3.5–5.3)
PROT SERPL-MCNC: 8 G/DL — SIGNIFICANT CHANGE UP (ref 6–8.3)
PROTHROM AB SERPL-ACNC: 13.7 SEC — HIGH (ref 10.5–13.4)
RBC # BLD: 2.34 M/UL — LOW (ref 3.8–5.2)
RBC # FLD: 17.9 % — HIGH (ref 10.3–14.5)
RBC BLD AUTO: ABNORMAL
RH IG SCN BLD-IMP: POSITIVE — SIGNIFICANT CHANGE UP
SARS-COV-2 RNA SPEC QL NAA+PROBE: NEGATIVE — SIGNIFICANT CHANGE UP
SODIUM SERPL-SCNC: 140 MMOL/L — SIGNIFICANT CHANGE UP (ref 135–145)
TROPONIN T SERPL-MCNC: <0.01 NG/ML — SIGNIFICANT CHANGE UP (ref 0–0.01)
WBC # BLD: 6.35 K/UL — SIGNIFICANT CHANGE UP (ref 3.8–10.5)
WBC # FLD AUTO: 6.35 K/UL — SIGNIFICANT CHANGE UP (ref 3.8–10.5)

## 2023-01-03 PROCEDURE — 93015 CV STRESS TEST SUPVJ I&R: CPT

## 2023-01-03 PROCEDURE — 78452 HT MUSCLE IMAGE SPECT MULT: CPT

## 2023-01-03 PROCEDURE — 93306 TTE W/DOPPLER COMPLETE: CPT

## 2023-01-03 PROCEDURE — A9500: CPT

## 2023-01-03 PROCEDURE — 99285 EMERGENCY DEPT VISIT HI MDM: CPT

## 2023-01-03 PROCEDURE — 71045 X-RAY EXAM CHEST 1 VIEW: CPT | Mod: 26

## 2023-01-03 RX ORDER — SODIUM BICARBONATE 1 MEQ/ML
1 SYRINGE (ML) INTRAVENOUS
Qty: 0 | Refills: 0 | DISCHARGE

## 2023-01-03 RX ORDER — ATORVASTATIN CALCIUM 80 MG/1
40 TABLET, FILM COATED ORAL AT BEDTIME
Refills: 0 | Status: DISCONTINUED | OUTPATIENT
Start: 2023-01-03 | End: 2023-01-05

## 2023-01-03 RX ORDER — CALCITRIOL 0.5 UG/1
1 CAPSULE ORAL
Qty: 0 | Refills: 0 | DISCHARGE

## 2023-01-03 RX ORDER — SODIUM BICARBONATE 1 MEQ/ML
650 SYRINGE (ML) INTRAVENOUS
Refills: 0 | Status: DISCONTINUED | OUTPATIENT
Start: 2023-01-03 | End: 2023-01-05

## 2023-01-03 RX ORDER — LATANOPROST 0.05 MG/ML
1 SOLUTION/ DROPS OPHTHALMIC; TOPICAL
Qty: 0 | Refills: 0 | DISCHARGE

## 2023-01-03 RX ORDER — SODIUM CHLORIDE 9 MG/ML
1000 INJECTION INTRAMUSCULAR; INTRAVENOUS; SUBCUTANEOUS
Refills: 0 | Status: DISCONTINUED | OUTPATIENT
Start: 2023-01-03 | End: 2023-01-04

## 2023-01-03 RX ORDER — CALCITRIOL 0.5 UG/1
0.25 CAPSULE ORAL DAILY
Refills: 0 | Status: DISCONTINUED | OUTPATIENT
Start: 2023-01-04 | End: 2023-01-05

## 2023-01-03 RX ORDER — RUXOLITINIB 25 MG/1
1 TABLET ORAL
Qty: 0 | Refills: 0 | DISCHARGE

## 2023-01-03 RX ORDER — AMLODIPINE BESYLATE 2.5 MG/1
10 TABLET ORAL ONCE
Refills: 0 | Status: COMPLETED | OUTPATIENT
Start: 2023-01-04 | End: 2023-01-04

## 2023-01-03 RX ORDER — DORZOLAMIDE HYDROCHLORIDE 20 MG/ML
1 SOLUTION/ DROPS OPHTHALMIC
Qty: 0 | Refills: 0 | DISCHARGE

## 2023-01-03 RX ORDER — ERYTHROMYCIN BASE 5 MG/GRAM
1 OINTMENT (GRAM) OPHTHALMIC (EYE)
Qty: 0 | Refills: 0 | DISCHARGE

## 2023-01-03 RX ORDER — LATANOPROST 0.05 MG/ML
1 SOLUTION/ DROPS OPHTHALMIC; TOPICAL AT BEDTIME
Refills: 0 | Status: DISCONTINUED | OUTPATIENT
Start: 2023-01-03 | End: 2023-01-05

## 2023-01-03 RX ORDER — ASCORBIC ACID 60 MG
1000 TABLET,CHEWABLE ORAL DAILY
Refills: 0 | Status: DISCONTINUED | OUTPATIENT
Start: 2023-01-04 | End: 2023-01-05

## 2023-01-03 RX ORDER — ASCORBIC ACID 60 MG
1 TABLET,CHEWABLE ORAL
Qty: 0 | Refills: 0 | DISCHARGE

## 2023-01-03 RX ADMIN — ATORVASTATIN CALCIUM 40 MILLIGRAM(S): 80 TABLET, FILM COATED ORAL at 22:35

## 2023-01-03 RX ADMIN — SODIUM CHLORIDE 75 MILLILITER(S): 9 INJECTION INTRAMUSCULAR; INTRAVENOUS; SUBCUTANEOUS at 22:36

## 2023-01-03 NOTE — H&P ADULT - NSICDXFAMILYHX_GEN_ALL_CORE_FT
FAMILY HISTORY:  No pertinent family history in first degree relatives     FAMILY HISTORY:  Family history of early CAD     FAMILY HISTORY:  Sibling  Still living? Unknown  FH: CAD (coronary artery disease), Age at diagnosis: Age Unknown

## 2023-01-03 NOTE — H&P ADULT - NSICDXPASTMEDICALHX_GEN_ALL_CORE_FT
PAST MEDICAL HISTORY:  Anemia of chronic disease     Blind left eye     HTN (hypertension)     MDS (myelodysplastic syndrome)     Stage 3 chronic kidney disease

## 2023-01-03 NOTE — ED PEDIATRIC NURSE REASSESSMENT NOTE - NS ED NURSE REASSESS COMMENT FT2
Gave report to JENSEN CUMMINS 5LACHMAN.  Patient denies any discomfort at this time.   Patient is alert and oriented. A&O4, steady gait noted.

## 2023-01-03 NOTE — H&P ADULT - PROBLEM SELECTOR PLAN 1
HD stable, CP free, troponin neg x 1, EKG NSR with 1mm ST depression in II only     plan for cardiac cath 1/4/23  AFTER case is discussed with Dr Irizarry  issues: Hgb 7.5, Cr 1.41 - both at baseline   defer renal and heme consult until am (late admission) after case d/w Dr Irizarry     patient consented for cardiac cath, will need to be added to schedule after confirming with Dr Irizarry     pre-cath IVF hydration at 75 cc/h x 12 hrs overnight  as per protocol   as per Dr Jimenez office TTE 1/3/2023 with nl EF, mild MR/TR

## 2023-01-03 NOTE — H&P ADULT - NSHPOUTPATIENTPROVIDERS_GEN_ALL_CORE
cards Dr Jimenez, renal Dr Mcknight, heme Dr Stroud cards Dr Jimenez, renal Dr Mcknight, heme Dr Burton cards Dr Jimenez, renal Dr Mcknight, heme Dr Medinalec

## 2023-01-03 NOTE — H&P ADULT - ASSESSMENT
71yo F with HTN, HLD, CKD III Cr 1.57 in 11/2022 (followed by Dr Mcknight), Anemia of chronic disease secondary to MDS/myelofibrosis -on  ruxolitinib (Jakafi) (baseline  Hgb 8, Hgb was 7.3 in November 2022, saw Dr Estevez 1/2/23 for Hgb 7.2 - was told  no need to transfuse),  who was sent  to [facility] ED 1/03/23 by her cardiologist Dr Jimenez for abnormal stress test  (2.7mm ST depressions in inferior and lateral leads and runs of possible VT with exercise, nuclear images are reportedly normal) with plans for medical optimization prior to likely cardiac cath 1/4/23.    73yo F with HTN, HLD, CKD III Cr 1.57 in 11/2022 (followed by Dr Mcknight), Anemia of chronic disease secondary to MDS/myelofibrosis -on  ruxolitinib (Jakafi) (baseline  Hgb 8, Hgb was 7.3 in November 2022, saw Dr Estevez 1/2/23 for Hgb 7.2 - was told  no need to transfuse),  who was sent  to [facility] ED 1/03/23 by her cardiologist Dr Jimenez for abnormal stress test  (2.7mm ST depressions in inferior and lateral leads and runs of possible VT with exercise, nuclear images are reportedly normal) with plans for medical optimization prior to likely cardiac cath 1/4/23.

## 2023-01-03 NOTE — ED ADULT NURSE NOTE - OBJECTIVE STATEMENT
Pt presents to ED sent by outpatient cardiologist for abn stress test, Pt states " I had a routine cardiologist appointment, I go every year and haven't been in awhile, I told them I was having indigestion and they scheduled me for the stress test, they told me I have to come to the ER for a cardiac cath". Pt denies CP at this time. Hx of MDS.

## 2023-01-03 NOTE — H&P ADULT - NSHPLABSRESULTS_GEN_ALL_CORE
7.5    6.35  )-----------( 286      ( 03 Jan 2023 18:19 )             24.0 7.5    6.35  )-----------( 286      ( 03 Jan 2023 18:19 )             24.0     01-03    140  |  105  |  32<H>  ----------------------------<  97  4.8   |  24  |  1.41<H>    Ca    9.5      03 Jan 2023 18:19  Mg     2.0     01-03    TPro  8.0  /  Alb  4.9  /  TBili  0.4  /  DBili  x   /  AST  25  /  ALT  30  /  AlkPhos  90  01-03    CARDIAC MARKERS ( 03 Jan 2023 18:19 )  x     / <0.01 ng/mL / x     / x     / x        PT/INR - ( 03 Jan 2023 18:19 )   PT: 13.7 sec;   INR: 1.15          PTT - ( 03 Jan 2023 18:19 )  PTT:32.4 sec

## 2023-01-03 NOTE — ED PROVIDER NOTE - OBJECTIVE STATEMENT
73yo F with HTN, HLD, CKD III Cr 1.57 in 11/2022 (followed by Dr Mcknight), Anemia of chronic disease secondary to MDS/myelofibrosis (cosmo Hgb 8, was 7.3 in November 2022, on ruxolitinib (Jakafi), followed  by Dr Estevez),  who was sent  to [facility] ED 1/03/23 by her cardiologist Dr Jimenez for abnormal stress test  (2.7mmST depressions and runs of possible VT with exercise, nuclear images are reportedly normal) with plans for medical optimization prior to likely cardiac cath 1/4/23    Patient with reports of exertional dyspnea walking up a hill, but reports it to be  long standing and unchanged. Denies exertional or rest chest discomfort. ROS negative for edema, orthopnea, PND, dizziness, syncope.  Exercise tolerance is walking  4-5 blocks 73yo F with HTN, HLD, CKD III Cr 1.57 in 11/2022 (followed by Dr Mcknight), Anemia of chronic disease secondary to MDS/myelofibrosis (cosmo Hgb 8, was 7.3 in November 2022, on ruxolitinib (Jakafi), followed  by Dr Estevez),  who was sent  to [facility] ED 1/03/23 by her cardiologist Dr Jimenez for abnormal stress test  (2.7mmST depressions and runs of possible VT with exercise, nuclear images are reportedly normal) with plans for medical optimization prior to likely cardiac cath 1/4/23. Reports she had the test as routine screening/ was having some symptoms of indigestion. No chest pain, sob. Currently asymptomatic.

## 2023-01-03 NOTE — H&P ADULT - PROBLEM SELECTOR PLAN 2
baseline Hgb around 8, recent labs reported with Hgb 7.3-7.1, patient  saw heme Dr Estevez 7/2/23 and reports being told no blood transfusion needed    of note, last transfused in 3/2022 for Hgb in 6's range secondary to myelofibrosis - baseline Hgb around 8, recent labs reported with Hgb 7.3-7.1, patient  saw heme Dr Estevez 7/2/23 and reports being told no blood transfusion needed    at home on ruxolitinib (Jakafi) - does not have it with her on admit, pharmacy called, need special authorization and formed filled in am     of note, last transfused in 3/2022 for Hgb in 6's range secondary to myelofibrosis - baseline Hgb around 8, recent labs reported with Hgb 7.3-7.1, patient  saw heme Dr Estevez 7/2/23 and reports being told no blood transfusion needed    at home on ruxolitinib (Jakafi) - does not have it with her on admit, pharmacy called - don't have it and needs to be ordered after special authorization and formed filled in am     of note, last transfused in 3/2022 for Hgb in 6's range

## 2023-01-03 NOTE — ED ADULT NURSE NOTE - NSIMPLEMENTINTERV_GEN_ALL_ED
Implemented All Universal Safety Interventions:  [place] to call system. Call bell, personal items and telephone within reach. Instruct patient to call for assistance. Room bathroom lighting operational. Non-slip footwear when patient is off stretcher. Physically safe environment: no spills, clutter or unnecessary equipment. Stretcher in lowest position, wheels locked, appropriate side rails in place.

## 2023-01-03 NOTE — H&P ADULT - PROBLEM SELECTOR PLAN 3
at baseline  holding nephrotoxics  HCTZ and losartan on admit prior to cath   precath IVF at 75 cc/h x 12 hrs ordered

## 2023-01-03 NOTE — ED PROVIDER NOTE - CLINICAL SUMMARY MEDICAL DECISION MAKING FREE TEXT BOX
here with abnormal outpatient stress test. currently asymptomatic. concern for cad/ acs. labs/ ecg/cxr ordered. cxr neg for pneumonia, pneumothorax, widened mediastinum to suggest dissection. discussed with cardiology, admit for further workup, likely cath.  has new problems requiring additional workup and treatment  discussed case with cards fellow/pa  reviewed records from outpatient stress test  additional history from outpatient cardiology note  prior smoker increased cad risk

## 2023-01-03 NOTE — H&P ADULT - HISTORY OF PRESENT ILLNESS
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 ED 1/03/23 by her cardiologist Dr Jimenez for abnormal stress test  (2.7mmST depressions and runs of possible VT with exercise, nuclear images are reportedly normal) with plans for medical optimization prior to likely cardiac cath 1/4/23      Patient with reports of exertional dyspnea walking up a hill, but reports it to be  long standing and unchanged. Denies exeertinal or rest  chest discomfort. ROS negative for edema, orthopnea, PND, dizziness, syncope.  Exercise tolerance id walking  4-5 blocks  every other day.          INCOMPLETE     71yo F with HTN, HLD, CKD III Cr 1.57 in 11/2022 (followed by Dr Mcknight), Anemia of chronic disease secondary to myelofibrosis (cosmo Hgb 8, on ruxolitinib (Jakafi), followed  by Dr Estevez),  who was sent  to [facility] ED 1/03/23 by her cardiologist Dr Jimenez for abnormal stress test  (2.7mmST depressions and runs of possible VT with exercise, nuclear images are reportedly normal) with plans for medical optimization prior to likely cardiac cath 1/4/23    Patient with reports of exertional dyspnea walking up a hill, but reports it to be  long standing and unchanged. Denies exeertinal or rest  chest discomfort. ROS negative for edema, orthopnea, PND, dizziness, syncope.  Exercise tolerance id walking  4-5 blocks  every other day.          INCOMPLETE     73yo F with HTN, HLD, CKD III Cr 1.57 in 11/2022 (followed by Dr Mcknight), Anemia of chronic disease secondary to myelofibrosis (cosmo Hgb 8, was 7.3 in November 2022, on ruxolitinib (Jakafi), followed  by Dr Estevez),  who was sent  to [facility] ED 1/03/23 by her cardiologist Dr Jimenez for abnormal stress test  (2.7mmST depressions and runs of possible VT with exercise, nuclear images are reportedly normal) with plans for medical optimization prior to likely cardiac cath 1/4/23    Patient with reports of exertional dyspnea walking up a hill, but reports it to be  long standing and unchanged. Denies exeertinal or rest  chest discomfort. ROS negative for edema, orthopnea, PND, dizziness, syncope.  Exercise tolerance id walking  4-5 blocks  every other day.          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 Denies exeertinal or rest  chest discomfort. ROS negative for edema, orthopnea, PND, dizziness, syncope.  Exercise tolerance id walking  4-5 blocks  every other day.     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 Denies exeertinal or rest  chest discomfort. ROS negative for edema, orthopnea, PND, dizziness, syncope.  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 ED 1/03/23 by her cardiologist Dr Jimenez for abnormal stress test  (2.7mmST depressions and runs of possible VT with exercise, nuclear images are reportedly normal) with plans for medical optimization prior to likely cardiac cath 1/4/23    Patient with reports of exertional dyspnea walking up a hill, but reports it to be  long standing and unchanged. Denies exeertinal or rest  chest discomfort. ROS negative for edema, orthopnea, PND, dizziness, syncope.  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 Denies exertional or rest chest discomfort. ROS negative for edema, orthopnea, PND, dizziness, syncope.  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 ED 1/03/23 by her cardiologist Dr Jimenez for abnormal stress test  (2.7mmST depressions in inferior and lateral leads and runs of possible VT with exercise, nuclear images are reportedly normal) with plans for medical optimization prior to likely cardiac cath 1/4/23    Patient with reports of exertional dyspnea walking up a hill but it is  long standing and unchanged. Denies exertional or rest chest discomfort but noted burning indigestion sensation mid sternum occasionally for the past several weeks after meals, lasting about 5 min.  ROS negative for edema, orthopnea, PND, dizziness, syncope.  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 ED 1/03/23 by her cardiologist Dr Jimenez for abnormal stress test  (2.7mm ST depressions in inferior and lateral leads and runs of possible VT with exercise, nuclear images are reportedly normal) with plans for medical optimization prior to likely cardiac cath 1/4/23.     Patient with reports of exertional dyspnea walking up a hill but reports this to be long standing and unchanged. Denies exertional or rest chest discomfort but noted burning indigestion sensation mid sternum occasionally for the past several weeks after meals, lasting about 5 min.  ROS negative for edema, orthopnea, PND, dizziness, syncope.  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 ED 1/03/23 by her cardiologist Dr Jimenez for abnormal stress test  (2.7mm ST depressions in inferior and lateral leads and runs of possible VT with exercise, nuclear images are reportedly normal) with plans for medical optimization prior to likely cardiac cath 1/4/23.     Patient with reports of exertional dyspnea walking up a hill but reports this to be long standing and unchanged. Denies exertional or rest chest discomfort but noted burning indigestion sensation mid sternum occasionally for the past several weeks after meals, lasting about 5 min.  ROS negative for edema, orthopnea, PND, dizziness, syncope.  Exercise tolerance is walking  4-5 blocks     Patient underwent cardiac work up today:   office  TTE 1/3/2023 with nl EF, mild MR/TR (verbal report)  NST 1/3/2023 - verbal report   + 2.7mm ST depressions inferior and lateral leads and runs of possible VT with exercise, nuclear images are normal    In ED : asymptomatic,  /71--> 144/58, HR 70's,  RR 18, O2 sat 99% RA, EKG : NSR at 80, 1mm ST depression in II, troponin neg x 1  admit labs: Hgb 7.5, Cr 1.41, K 4.8, Mg 2.0

## 2023-01-04 ENCOUNTER — TRANSCRIPTION ENCOUNTER (OUTPATIENT)
Age: 73
End: 2023-01-04

## 2023-01-04 ENCOUNTER — NON-APPOINTMENT (OUTPATIENT)
Age: 73
End: 2023-01-04

## 2023-01-04 DIAGNOSIS — D64.9 ANEMIA, UNSPECIFIED: ICD-10-CM

## 2023-01-04 DIAGNOSIS — D75.81 MYELOFIBROSIS: ICD-10-CM

## 2023-01-04 LAB
ANION GAP SERPL CALC-SCNC: 10 MMOL/L — SIGNIFICANT CHANGE UP (ref 5–17)
ANION GAP SERPL CALC-SCNC: 8 MMOL/L — SIGNIFICANT CHANGE UP (ref 5–17)
BLD GP AB SCN SERPL QL: NEGATIVE — SIGNIFICANT CHANGE UP
BUN SERPL-MCNC: 22 MG/DL — SIGNIFICANT CHANGE UP (ref 7–23)
BUN SERPL-MCNC: 28 MG/DL — HIGH (ref 7–23)
CALCIUM SERPL-MCNC: 9 MG/DL — SIGNIFICANT CHANGE UP (ref 8.4–10.5)
CALCIUM SERPL-MCNC: 9.4 MG/DL — SIGNIFICANT CHANGE UP (ref 8.4–10.5)
CHLORIDE SERPL-SCNC: 108 MMOL/L — SIGNIFICANT CHANGE UP (ref 96–108)
CHLORIDE SERPL-SCNC: 109 MMOL/L — HIGH (ref 96–108)
CHOLEST SERPL-MCNC: 107 MG/DL — SIGNIFICANT CHANGE UP
CO2 SERPL-SCNC: 22 MMOL/L — SIGNIFICANT CHANGE UP (ref 22–31)
CO2 SERPL-SCNC: 24 MMOL/L — SIGNIFICANT CHANGE UP (ref 22–31)
CREAT SERPL-MCNC: 1.23 MG/DL — SIGNIFICANT CHANGE UP (ref 0.5–1.3)
CREAT SERPL-MCNC: 1.42 MG/DL — HIGH (ref 0.5–1.3)
EGFR: 39 ML/MIN/1.73M2 — LOW
EGFR: 46 ML/MIN/1.73M2 — LOW
GLUCOSE BLDC GLUCOMTR-MCNC: 84 MG/DL — SIGNIFICANT CHANGE UP (ref 70–99)
GLUCOSE SERPL-MCNC: 92 MG/DL — SIGNIFICANT CHANGE UP (ref 70–99)
GLUCOSE SERPL-MCNC: 92 MG/DL — SIGNIFICANT CHANGE UP (ref 70–99)
HCT VFR BLD CALC: 21.3 % — LOW (ref 34.5–45)
HCT VFR BLD CALC: 21.6 % — LOW (ref 34.5–45)
HCT VFR BLD CALC: 26 % — LOW (ref 34.5–45)
HCT VFR BLD CALC: 29.2 % — LOW (ref 34.5–45)
HDLC SERPL-MCNC: 25 MG/DL — LOW
HGB BLD-MCNC: 6.4 G/DL — CRITICAL LOW (ref 11.5–15.5)
HGB BLD-MCNC: 6.7 G/DL — CRITICAL LOW (ref 11.5–15.5)
HGB BLD-MCNC: 8.2 G/DL — LOW (ref 11.5–15.5)
HGB BLD-MCNC: 9.4 G/DL — LOW (ref 11.5–15.5)
LIPID PNL WITH DIRECT LDL SERPL: 54 MG/DL — SIGNIFICANT CHANGE UP
MAGNESIUM SERPL-MCNC: 1.8 MG/DL — SIGNIFICANT CHANGE UP (ref 1.6–2.6)
MAGNESIUM SERPL-MCNC: 1.9 MG/DL — SIGNIFICANT CHANGE UP (ref 1.6–2.6)
MCHC RBC-ENTMCNC: 30 GM/DL — LOW (ref 32–36)
MCHC RBC-ENTMCNC: 31 GM/DL — LOW (ref 32–36)
MCHC RBC-ENTMCNC: 31.3 PG — SIGNIFICANT CHANGE UP (ref 27–34)
MCHC RBC-ENTMCNC: 31.4 PG — SIGNIFICANT CHANGE UP (ref 27–34)
MCHC RBC-ENTMCNC: 31.5 GM/DL — LOW (ref 32–36)
MCHC RBC-ENTMCNC: 31.6 PG — SIGNIFICANT CHANGE UP (ref 27–34)
MCHC RBC-ENTMCNC: 31.8 PG — SIGNIFICANT CHANGE UP (ref 27–34)
MCHC RBC-ENTMCNC: 32.2 GM/DL — SIGNIFICANT CHANGE UP (ref 32–36)
MCV RBC AUTO: 101.9 FL — HIGH (ref 80–100)
MCV RBC AUTO: 106 FL — HIGH (ref 80–100)
MCV RBC AUTO: 97.3 FL — SIGNIFICANT CHANGE UP (ref 80–100)
MCV RBC AUTO: 99.6 FL — SIGNIFICANT CHANGE UP (ref 80–100)
NON HDL CHOLESTEROL: 82 MG/DL — SIGNIFICANT CHANGE UP
NRBC # BLD: 11 /100 WBCS — HIGH (ref 0–0)
NRBC # BLD: 7 /100 WBCS — HIGH (ref 0–0)
PHOSPHATE SERPL-MCNC: 3.2 MG/DL — SIGNIFICANT CHANGE UP (ref 2.5–4.5)
PLATELET # BLD AUTO: 183 K/UL — SIGNIFICANT CHANGE UP (ref 150–400)
PLATELET # BLD AUTO: 199 K/UL — SIGNIFICANT CHANGE UP (ref 150–400)
PLATELET # BLD AUTO: 199 K/UL — SIGNIFICANT CHANGE UP (ref 150–400)
PLATELET # BLD AUTO: 203 K/UL — SIGNIFICANT CHANGE UP (ref 150–400)
POTASSIUM SERPL-MCNC: 3.8 MMOL/L — SIGNIFICANT CHANGE UP (ref 3.5–5.3)
POTASSIUM SERPL-MCNC: 3.8 MMOL/L — SIGNIFICANT CHANGE UP (ref 3.5–5.3)
POTASSIUM SERPL-SCNC: 3.8 MMOL/L — SIGNIFICANT CHANGE UP (ref 3.5–5.3)
POTASSIUM SERPL-SCNC: 3.8 MMOL/L — SIGNIFICANT CHANGE UP (ref 3.5–5.3)
RBC # BLD: 2.01 M/UL — LOW (ref 3.8–5.2)
RBC # BLD: 2.12 M/UL — LOW (ref 3.8–5.2)
RBC # BLD: 2.61 M/UL — LOW (ref 3.8–5.2)
RBC # BLD: 3 M/UL — LOW (ref 3.8–5.2)
RBC # FLD: 17.5 % — HIGH (ref 10.3–14.5)
RBC # FLD: 17.6 % — HIGH (ref 10.3–14.5)
RBC # FLD: 17.9 % — HIGH (ref 10.3–14.5)
RBC # FLD: 18.1 % — HIGH (ref 10.3–14.5)
RH IG SCN BLD-IMP: POSITIVE — SIGNIFICANT CHANGE UP
SODIUM SERPL-SCNC: 139 MMOL/L — SIGNIFICANT CHANGE UP (ref 135–145)
SODIUM SERPL-SCNC: 142 MMOL/L — SIGNIFICANT CHANGE UP (ref 135–145)
TRIGL SERPL-MCNC: 140 MG/DL — SIGNIFICANT CHANGE UP
TROPONIN T SERPL-MCNC: 0.01 NG/ML — SIGNIFICANT CHANGE UP (ref 0–0.01)
WBC # BLD: 3.8 K/UL — SIGNIFICANT CHANGE UP (ref 3.8–10.5)
WBC # BLD: 3.86 K/UL — SIGNIFICANT CHANGE UP (ref 3.8–10.5)
WBC # BLD: 4.09 K/UL — SIGNIFICANT CHANGE UP (ref 3.8–10.5)
WBC # BLD: 4.63 K/UL — SIGNIFICANT CHANGE UP (ref 3.8–10.5)
WBC # FLD AUTO: 3.8 K/UL — SIGNIFICANT CHANGE UP (ref 3.8–10.5)
WBC # FLD AUTO: 3.86 K/UL — SIGNIFICANT CHANGE UP (ref 3.8–10.5)
WBC # FLD AUTO: 4.09 K/UL — SIGNIFICANT CHANGE UP (ref 3.8–10.5)
WBC # FLD AUTO: 4.63 K/UL — SIGNIFICANT CHANGE UP (ref 3.8–10.5)

## 2023-01-04 PROCEDURE — 93458 L HRT ARTERY/VENTRICLE ANGIO: CPT | Mod: 26

## 2023-01-04 PROCEDURE — 99223 1ST HOSP IP/OBS HIGH 75: CPT

## 2023-01-04 PROCEDURE — 99152 MOD SED SAME PHYS/QHP 5/>YRS: CPT

## 2023-01-04 RX ORDER — METOPROLOL TARTRATE 50 MG
25 TABLET ORAL EVERY 12 HOURS
Refills: 0 | Status: DISCONTINUED | OUTPATIENT
Start: 2023-01-04 | End: 2023-01-05

## 2023-01-04 RX ORDER — MAGNESIUM SULFATE 500 MG/ML
2 VIAL (ML) INJECTION ONCE
Refills: 0 | Status: COMPLETED | OUTPATIENT
Start: 2023-01-04 | End: 2023-01-04

## 2023-01-04 RX ORDER — METOPROLOL TARTRATE 50 MG
12.5 TABLET ORAL ONCE
Refills: 0 | Status: COMPLETED | OUTPATIENT
Start: 2023-01-04 | End: 2023-01-04

## 2023-01-04 RX ORDER — APIXABAN 2.5 MG/1
5 TABLET, FILM COATED ORAL EVERY 12 HOURS
Refills: 0 | Status: DISCONTINUED | OUTPATIENT
Start: 2023-01-04 | End: 2023-01-05

## 2023-01-04 RX ORDER — METOPROLOL TARTRATE 50 MG
2.5 TABLET ORAL ONCE
Refills: 0 | Status: COMPLETED | OUTPATIENT
Start: 2023-01-04 | End: 2023-01-04

## 2023-01-04 RX ORDER — POTASSIUM CHLORIDE 20 MEQ
20 PACKET (EA) ORAL ONCE
Refills: 0 | Status: COMPLETED | OUTPATIENT
Start: 2023-01-04 | End: 2023-01-04

## 2023-01-04 RX ORDER — METOPROLOL TARTRATE 50 MG
25 TABLET ORAL EVERY 12 HOURS
Refills: 0 | Status: DISCONTINUED | OUTPATIENT
Start: 2023-01-04 | End: 2023-01-04

## 2023-01-04 RX ORDER — SODIUM CHLORIDE 9 MG/ML
500 INJECTION INTRAMUSCULAR; INTRAVENOUS; SUBCUTANEOUS
Refills: 0 | Status: DISCONTINUED | OUTPATIENT
Start: 2023-01-04 | End: 2023-01-05

## 2023-01-04 RX ORDER — SODIUM CHLORIDE 9 MG/ML
1000 INJECTION INTRAMUSCULAR; INTRAVENOUS; SUBCUTANEOUS
Refills: 0 | Status: DISCONTINUED | OUTPATIENT
Start: 2023-01-04 | End: 2023-01-04

## 2023-01-04 RX ADMIN — Medication 2.5 MILLIGRAM(S): at 15:04

## 2023-01-04 RX ADMIN — SODIUM CHLORIDE 150 MILLILITER(S): 9 INJECTION INTRAMUSCULAR; INTRAVENOUS; SUBCUTANEOUS at 12:42

## 2023-01-04 RX ADMIN — APIXABAN 5 MILLIGRAM(S): 2.5 TABLET, FILM COATED ORAL at 18:54

## 2023-01-04 RX ADMIN — LATANOPROST 1 DROP(S): 0.05 SOLUTION/ DROPS OPHTHALMIC; TOPICAL at 21:32

## 2023-01-04 RX ADMIN — LATANOPROST 1 DROP(S): 0.05 SOLUTION/ DROPS OPHTHALMIC; TOPICAL at 00:39

## 2023-01-04 RX ADMIN — Medication 25 MILLIGRAM(S): at 18:54

## 2023-01-04 RX ADMIN — Medication 12.5 MILLIGRAM(S): at 14:45

## 2023-01-04 RX ADMIN — Medication 650 MILLIGRAM(S): at 18:28

## 2023-01-04 RX ADMIN — Medication 25 GRAM(S): at 18:28

## 2023-01-04 RX ADMIN — Medication 20 MILLIEQUIVALENT(S): at 17:39

## 2023-01-04 RX ADMIN — ATORVASTATIN CALCIUM 40 MILLIGRAM(S): 80 TABLET, FILM COATED ORAL at 21:31

## 2023-01-04 RX ADMIN — Medication 1000 MILLIGRAM(S): at 14:06

## 2023-01-04 RX ADMIN — CALCITRIOL 0.25 MICROGRAM(S): 0.5 CAPSULE ORAL at 14:05

## 2023-01-04 RX ADMIN — Medication 2.5 MILLIGRAM(S): at 14:44

## 2023-01-04 RX ADMIN — SODIUM CHLORIDE 30 MILLILITER(S): 9 INJECTION INTRAMUSCULAR; INTRAVENOUS; SUBCUTANEOUS at 09:16

## 2023-01-04 RX ADMIN — Medication 650 MILLIGRAM(S): at 05:26

## 2023-01-04 RX ADMIN — AMLODIPINE BESYLATE 10 MILLIGRAM(S): 2.5 TABLET ORAL at 05:27
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
## 2023-01-04 NOTE — DISCHARGE NOTE PROVIDER - NSDCFUADDAPPT_GEN_ALL_CORE_FT
Please reach out with Dr. Jimenez to see if she would like a follow up in 1-2 weeks  Appointment w/ Dr. Jimenez on 01/19/23 at 2PM  [address] [id number] [phone]    EP will reach out to you to schedule an appointment for management of your a-fib

## 2023-01-04 NOTE — DISCHARGE NOTE PROVIDER - CARE PROVIDER_API CALL
Abelardo Stroud)  Hematology; Medical Oncology  [address], [address]  [place] [address]  Phone: [phone]  Fax: [phone]  Scheduled Appointment: 01/20/2023 01:15 PM

## 2023-01-04 NOTE — PROVIDER CONTACT NOTE (CRITICAL VALUE NOTIFICATION) - BACKGROUND
pt admitted for abnormal stress test.   pt pending possible cath.  pt baseline hgb 8  hgb during admission 7.5
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
## 2023-01-04 NOTE — DISCHARGE NOTE PROVIDER - NSDCMRMEDTOKEN_GEN_ALL_CORE_FT
amLODIPine 10 mg oral tablet: 1 tab(s) orally once a day  calcitriol 0.25 mcg oral capsule: 1 cap(s) orally once a day  hydroCHLOROthiazide 12.5 mg oral tablet: 1 tab(s) orally once a day  latanoprost 0.005% ophthalmic solution: 1 drop(s) to each affected eye once a day (in the evening)  Lipitor 40 mg oral tablet: 1 tab(s) orally once a day  losartan 100 mg oral tablet: 1 tab(s) orally once a day  ruxolitinib 20 mg oral tablet: 1 tab(s) orally 2 times a day  sodium bicarbonate 650 mg oral tablet: 1 tab(s) orally 2 times a day  Vitamin C 1000 mg oral tablet: 1 tab(s) orally once a day   amiodarone 200 mg oral tablet: Please take 1 tablet every 12 hours for 5 days, then 1 tablet once a day moving foward  amLODIPine 10 mg oral tablet: 1 tab(s) orally once a day  apixaban 5 mg oral tablet: 1 tab(s) orally every 12 hours  calcitriol 0.25 mcg oral capsule: 1 cap(s) orally once a day  latanoprost 0.005% ophthalmic solution: 1 drop(s) to each affected eye once a day (in the evening)  Lipitor 10 mg oral tablet: 1 tab(s) orally once a day  metoprolol tartrate 25 mg oral tablet: 1 tab(s) orally every 12 hours  ruxolitinib 20 mg oral tablet: 1 tab(s) orally 2 times a day  sodium bicarbonate 650 mg oral tablet: 1 tab(s) orally 2 times a day  Vitamin C 1000 mg oral tablet: 1 tab(s) orally once a day

## 2023-01-04 NOTE — DISCHARGE NOTE PROVIDER - NSDCFUSCHEDAPPT_GEN_ALL_CORE_FT
Nahum Mcknight  [facility]  NE[address]  Scheduled Appointment: 03/14/2023     Radha Jimenez  [facility]  HEART[address]  Scheduled Appointment: 01/19/2023    Nahum Mcknight  [facility]  [address]  Scheduled Appointment: 03/14/2023

## 2023-01-04 NOTE — PROGRESS NOTE ADULT - ASSESSMENT
71yo F with HTN, HLD, CKD III Cr 1.57 in 11/2022 (followed by Dr Mcknight), Anemia of chronic disease secondary to MDS/myelofibrosis -on  ruxolitinib (Jakafi) (baseline  Hgb 8, Hgb was 7.3 in November 2022, saw Dr Estevez 1/2/23 for Hgb 7.2 - was told  no need to transfuse),  who was sent  to [facility] ED 1/03/23 by her cardiologist Dr Jimenez for abnormal stress test  (2.7mm ST depressions in inferior and lateral leads and runs of possible VT with exercise, nuclear images are reportedly normal) with plans for medical optimization prior to likely cardiac cath 1/4/23.

## 2023-01-04 NOTE — PROGRESS NOTE ADULT - SUBJECTIVE AND OBJECTIVE BOX
Interventional Cardiology PA Pre-cath Note    Hospital course:  71 y/o Female, FHx CAD, w/ PMHx of HTN, HLD, CKD stage III (Baseline Cr 1.57 11/2022), AOCD 2/2 MDS/myelofibrosis on Ruxolitinib (Baseline Hgb 8) sent into [facility] ED 1/3/22 by outpatient cardiologist, Dr. Jimenez, after found to have abnormal stress test. Pt endorses PAGAN walking uphill, but reports this is longstanding and unchanged in nature from prior episodes. ET walking 4-5 blocks. Also reports occasionally midsternal burning/indigestion after meals, lasting approximately 5 minutes for the past several weeks. Stress test 1/3/22 revealed 2.7mm STD in inferolateral leads and runs of possible VT w/ exercise; nuclear images reportedly normal. TTE 1/3/2023 with nl EF, mild MR/TR. Vitals in ER unremarkable. NSR at 80bpm w/ STD in II. Troponin T negative x1. Labs notable for Hgb 7.5, Cr 1.41. Admitted to cardiac telemetry with plan for cardiac catheterization w/ Dr. Long 1/4/22. CBC on 1/4/22 revealed severe anemia w/ Hgb 6.4 (repeat CBC confirming Hgb 6.7). Per interventional team, pt to be given 2U pRBC prior to procedure and will proceed with cath as scheduled.    RADIOLOGY  CXR 1/3/22: Mild bibasilar atelectasis/infiltrates.    CARDIOLOGY  TTE 1/3/2023: nl EF, mild MR/TR.   Stress test 1/3/22: 2.7mm STD in inferolateral leads and runs of possible VT w/ exercise; nuclear images reportedly normal    PMH:  HTN, HLD, CKD stage III (Baseline Cr 1.57 11/2022), AOCD 2/2 MDS/myelofibrosis on Ruxolitinib (Baseline Hgb 8)   PSH:  N/A  FHx: CAD    Social Hx: Denies tobacco, alcohol, or recreational drug use    ALLERGIES: NKDA, NKFA. Denies shellfish/contrast dye allergy    MEDS:   ascorbic acid 1000 milliGRAM(s) Oral daily  atorvastatin 40 milliGRAM(s) Oral at bedtime  calcitriol   Capsule 0.25 MICROGram(s) Oral daily  latanoprost 0.005% Ophthalmic Solution 1 Drop(s) Right EYE at bedtime  sodium bicarbonate 650 milliGRAM(s) Oral two times a day  sodium chloride 0.9%. 1000 milliLiter(s) IV Continuous <Continuous>    VITALS:  T(C): 37.1 (01-04-23 @ 09:06), Max: 37.1 (01-04-23 @ 09:06)  HR: 71 (01-04-23 @ 08:30) (68 - 106)  BP: 134/64 (01-04-23 @ 08:30) (115/65 - 150/71)  RR: 18 (01-04-23 @ 08:30) (18 - 18)  SpO2: 98% (01-04-23 @ 08:30) (98% - 100%)    PHYSICAL EXAMINATION:  As per HPI  HEENT: NCAT, +L prosthetic eye  NECK: No JVD, No carotid bruits B/L, +2 Carotid pulses B/L  PULM:  CTA B/L No W/R/R  CARD: RRR, +S1, +S2, No M/R/G  ABD: ND, +BS, NT, no masses  EXT: Warm, pedal edema yes/no, pitting/non-pitting  RECTAL: Guaiac negative/positive   NEURO: A & O x 3, no focal neurologic deficits  PULSES:	 radial 2+ b/l, femoral 2 + b/l ( no bruits b/l), DP 1+ b/l, PT 2+ b/l     Labs:                        6.7    4.09  )-----------( 199      ( 04 Jan 2023 07:54 )             21.6     01-04    142  |  108  |  28<H>  ----------------------------<  92  3.8   |  24  |  1.42<H>    Ca    9.4      04 Jan 2023 05:30  Mg     1.9     01-04    TPro  8.0  /  Alb  4.9  /  TBili  0.4  /  DBili  x   /  AST  25  /  ALT  30  /  AlkPhos  90  01-03    CARDIAC MARKERS ( 04 Jan 2023 05:30 )  x     / 0.01 ng/mL / x     / x     / x      CARDIAC MARKERS ( 03 Jan 2023 18:19 )  x     / <0.01 ng/mL / x     / x     / x        A/P:    71 y/o Female, FHx CAD, w/ PMHx of HTN, HLD, CKD stage III (Baseline Cr 1.57 11/2022), AOCD 2/2 MDS/myelofibrosis on Ruxolitinib (Baseline Hgb 8) sent into [facility] ED 1/3/22 by outpatient cardiologist, Dr. Jimenez, after found to have abnormal stress test. Hospital course c/b anemia, undergoing 2U pRBC transfusion. Plan for cardiac catheterization w/ Dr. Long.    - ASA II, Mallampati III  - H/H 6.7. 2U pRBC prior to cath per IC team. No load per IC fellow, will load on table if needed.  - Cr 1.42. Hx of CKD stage III; baseline Cr 1.57. s/p 75cc/hr x12hrs overnight.  - Suitable candidate for moderate sedation  - Pt consented by admitting provider Interventional Cardiology PA Pre-cath Note    Hospital course:  73 y/o Female, FHx CAD, w/ PMHx of HTN, HLD, CKD stage III (Baseline Cr 1.57 11/2022), AOCD 2/2 MDS/myelofibrosis on Ruxolitinib (Baseline Hgb 8) sent into [facility] ED 1/3/22 by outpatient cardiologist, Dr. Jimenez, after found to have abnormal stress test. Pt endorses PAGAN walking uphill, but reports this is longstanding and unchanged in nature from prior episodes. ET walking 4-5 blocks. Also reports occasionally midsternal burning/indigestion after meals, lasting approximately 5 minutes for the past several weeks. Stress test 1/3/22 revealed 2.7mm STD in inferolateral leads and runs of possible VT w/ exercise; nuclear images reportedly normal. TTE 1/3/2023 with nl EF, mild MR/TR. Vitals in ER unremarkable. NSR at 80bpm w/ STD in II. Troponin T negative x1. Labs notable for Hgb 7.5, Cr 1.41. Admitted to cardiac telemetry with plan for cardiac catheterization w/ Dr. Long 1/4/22. CBC on 1/4/22 revealed severe anemia w/ Hgb 6.4 (repeat CBC confirming Hgb 6.7). Per interventional team, pt to be given 2U pRBC prior to procedure and will proceed with cath as scheduled.    RADIOLOGY  CXR 1/3/22: Mild bibasilar atelectasis/infiltrates.    CARDIOLOGY  TTE 1/3/2023: nl EF, mild MR/TR.   Stress test 1/3/22: 2.7mm STD in inferolateral leads and runs of possible VT w/ exercise; nuclear images reportedly normal    PMH:  HTN, HLD, CKD stage III (Baseline Cr 1.57 11/2022), AOCD 2/2 MDS/myelofibrosis on Ruxolitinib (Baseline Hgb 8)   PSH:  N/A  FHx: CAD    Social Hx: Denies tobacco, alcohol, or recreational drug use    ALLERGIES: NKDA, NKFA. Denies shellfish/contrast dye allergy    MEDS:   ascorbic acid 1000 milliGRAM(s) Oral daily  atorvastatin 40 milliGRAM(s) Oral at bedtime  calcitriol   Capsule 0.25 MICROGram(s) Oral daily  latanoprost 0.005% Ophthalmic Solution 1 Drop(s) Right EYE at bedtime  sodium bicarbonate 650 milliGRAM(s) Oral two times a day  sodium chloride 0.9%. 1000 milliLiter(s) IV Continuous <Continuous>    VITALS:  T(C): 37.1 (01-04-23 @ 09:06), Max: 37.1 (01-04-23 @ 09:06)  HR: 71 (01-04-23 @ 08:30) (68 - 106)  BP: 134/64 (01-04-23 @ 08:30) (115/65 - 150/71)  RR: 18 (01-04-23 @ 08:30) (18 - 18)  SpO2: 98% (01-04-23 @ 08:30) (98% - 100%)    PHYSICAL EXAMINATION:  As per HPI  HEENT: NCAT, +L prosthetic eye  NECK: No JVD, No carotid bruits B/L, +2 Carotid pulses B/L  PULM:  CTA B/L No W/R/R  CARD: RRR, +S1, +S2, No M/R/G  ABD: ND, +BS, NT, no masses  EXT: Warm, no pedal edema  NEURO: A & O x 3, no focal neurologic deficits  PULSES:	 radial 2+ b/l, femoral 2 + b/l (no bruits b/l), DP 1+ b/l, PT 2+ b/l     Labs:                        6.7    4.09  )-----------( 199      ( 04 Jan 2023 07:54 )             21.6     01-04    142  |  108  |  28<H>  ----------------------------<  92  3.8   |  24  |  1.42<H>    Ca    9.4      04 Jan 2023 05:30  Mg     1.9     01-04    TPro  8.0  /  Alb  4.9  /  TBili  0.4  /  DBili  x   /  AST  25  /  ALT  30  /  AlkPhos  90  01-03    CARDIAC MARKERS ( 04 Jan 2023 05:30 )  x     / 0.01 ng/mL / x     / x     / x      CARDIAC MARKERS ( 03 Jan 2023 18:19 )  x     / <0.01 ng/mL / x     / x     / x        A/P:    73 y/o Female, FHx CAD, w/ PMHx of HTN, HLD, CKD stage III (Baseline Cr 1.57 11/2022), AOCD 2/2 MDS/myelofibrosis on Ruxolitinib (Baseline Hgb 8) sent into [facility] ED 1/3/22 by outpatient cardiologist, Dr. Jimenez, after found to have abnormal stress test. Hospital course c/b anemia, undergoing 2U pRBC transfusion. Plan for cardiac catheterization w/ Dr. Long.    - ASA II, Mallampati III  - H/H 6.7. 2U pRBC prior to cath per IC team. No load per IC fellow, will load on table if needed.  - Cr 1.42. Hx of CKD stage III; baseline Cr 1.57. s/p 75cc/hr x12hrs overnight.  - Suitable candidate for moderate sedation  - Pt consented by admitting provider
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
## 2023-01-04 NOTE — PROGRESS NOTE ADULT - PROBLEM SELECTOR PLAN 3
Home on ruxolitinib (Jakafi) - does not have it with her on admit, pharmacy called - don't have it and needs to be ordered after special authorization and formed filled in am

## 2023-01-04 NOTE — CHART NOTE - NSCHARTNOTEFT_GEN_A_CORE
New onset of AF/Flatter with RVR.    Patient was noticed to be with HR in the 140s-160s at 2:15pm. Patient just came back from diagnostic angiogram that was normal, patient completed 1U pRBC during her angiogram as had AM hgb 6.4, hemodynamically stable, with no acute bleed, in the setting of myelofibrosis requiring occasional blood transfusions.    At bedside patient is talking on the phone, asymptomatic, admits of mild pain and numbness at TR bend location. denies CP/SOB, dizziness, light headiness or palpitations. Vitals - -160, /87, T 98 and satting 99% on RA. Gluc 84. Labs during event hgb 8.2 from 6.7 after 1U pRBC, K3.8, Mg 1.8, Phos 3.2, Cr 1.23 (improving).  EKG: AF with RVR @127, 1mm STD on V5-6    Gave Lopressor IV 2.5mg x2 and Lopressor 12.5mg oral, HR corrected to 110-120s. EP consulted for new onset AF w/RVR

## 2023-01-04 NOTE — PROGRESS NOTE ADULT - ATTENDING COMMENTS
73yo F with HTN, HLD, CKD III Cr 1.57 in 11/2022 (followed by Dr Mcknight), Anemia of chronic disease secondary to MDS/myelofibrosis -on Jakafi for few years, (baseline  Hgb 8, Hgb was 7.3 in November 2022, saw Dr Estevez 1/2/23 for Hgb 7.2 - was told  no need to transfuse), baseline SOB at modest workload, no rest CP or SOB,  who was sent  to [facility] ED 1/03/23 by her cardiologist Dr Jimenez for abnormal stress test  (2.7mm ST depressions in inferior and lateral leads and runs of possible VT with exercise, nuclear images are reportedly normal).  Pain free at this time  Exam w conjunctival pallor, splenomegaly.   Trop neg  By hx, PAGAN is chronic and not substantively worse  Suspect sx mostly demand related with worsening anemia  Plan  cardiac cath today as per PMD

## 2023-01-04 NOTE — DISCHARGE NOTE PROVIDER - NSDCCPCAREPLAN_GEN_ALL_CORE_FT
PRINCIPAL DISCHARGE DIAGNOSIS  Diagnosis: Abnormal stress test  Assessment and Plan of Treatment: You presented after abnormal findings on your stress test. A stress test is used to see if there a re underlying issues in oxygen reaching your heart during physical activity when heart rate normally increases. Because we needed to rule out underlying occlusion in the blood vessles supplying your heart, we did a cardiac catheterization which did not find any major occlusions to explain your symtpoms. You did not need further intervention and you should follow up with your cardiologist in 2 weeks. If you develop worsening symtpoms of shortness of breath or develop new chest pain or pressure, reach out to your nearest emergency room.      SECONDARY DISCHARGE DIAGNOSES  Diagnosis: Anemia  Assessment and Plan of Treatment: When you came to the hospital you were found to have anemia. Anemia is the medical term for when a person has too few red blood cells. Red blood cells carry oxygen throughout your body; if you have too few red blood cells, your body is not able to get all the oxygen it needs. Most people with anemia have no symptoms, however common symptoms include: increased fatigue, shortness of breath, tiring easily, and headaches. Your anemia is likely caused by your diagnosis of myelofibrosis and this anemia is what is likely contributing to your shortness of breath and weakness. It can also explain the results of the stress test as your heart wasnt recieving enough blood during the test. If you experience any of these symptoms, or have any episodes of bloody vomit or bloody stool (can be red stool or black stool), please see your primary care provider or go to your emergency room for further evaluation.     PRINCIPAL DISCHARGE DIAGNOSIS  Diagnosis: Abnormal stress test  Assessment and Plan of Treatment: You presented after abnormal findings on your stress test. A stress test is used to see if there a re underlying issues in oxygen reaching your heart during physical activity when heart rate normally increases. Because we needed to rule out underlying occlusion in the blood vessles supplying your heart, we did a cardiac catheterization which did not find any major occlusions to explain your symtpoms. You did not need further intervention and you should follow up with your cardiologist in 2 weeks. If you develop worsening symtpoms of shortness of breath or develop new chest pain or pressure, reach out to your nearest emergency room.      SECONDARY DISCHARGE DIAGNOSES  Diagnosis: A-fib  Assessment and Plan of Treatment: You have a new diagnosis of atrial fibrillation on this admission, which is an issue with the electrical signlas of the heart leading to irregular heart rhythm. This is a condition where the atria, the small chambers of your heart,  not contract properly which increases your risk of stroke or heart attack.    -Please continue to take your new medications of Eliquis 5mg twice a day, Metoprolol 25mg bianca a day, and Amiodorone 200mg twice a day for 5 days (last day 01/09) then once a day moving foward.    -Please follow up with your cardiologist within 1-2 weeks of discharge for evaluation and to determine whether any changes need to be made to your medications.   - If you begin to experience new onset severe bleeding, chest pain, dizziness, falls, or very low heart rates (40s and below) please stop taking elqiuis and metoprolol and go to your nearest emergency department if you cannot contact your cardiologist.    Diagnosis: Anemia  Assessment and Plan of Treatment: When you came to the hospital you were found to have anemia. Anemia is the medical term for when a person has too few red blood cells. Red blood cells carry oxygen throughout your body; if you have too few red blood cells, your body is not able to get all the oxygen it needs. Most people with anemia have no symptoms, however common symptoms include: increased fatigue, shortness of breath, tiring easily, and headaches. Your anemia is likely caused by your diagnosis of myelofibrosis and this anemia is what is likely contributing to your shortness of breath and weakness. It can also explain the results of the stress test as your heart wasnt recieving enough blood during the test. If you experience any of these symptoms, or have any episodes of bloody vomit or bloody stool (can be red stool or black stool), please see your primary care provider or go to your emergency room for further evaluation.

## 2023-01-04 NOTE — PROGRESS NOTE ADULT - PROBLEM SELECTOR PLAN 5
Pt presents w/ Cr of 1.4, likely at baseline  Plan:  - holding nephrotoxics  HCTZ and losartan on admit prior to cath   - precath IVF at 75 cc/h x 12 hrs ordered

## 2023-01-04 NOTE — PROGRESS NOTE ADULT - PROBLEM SELECTOR PLAN 6
Presents with controlled BP, 134/64  Plan:  - continue norvasc 10mg   - hold HCTZ and losartan prior to cath given CKD stage III

## 2023-01-04 NOTE — PROGRESS NOTE ADULT - PROBLEM SELECTOR PLAN 2
Pt presents with chronic anemia, likely secondary to myelofibrosis - baseline Hgb around 8. Patient saw heme Dr Estevez 7/2/23 and reports being told no blood transfusion needed unless HgB < 7. AM labs today showing HgB 6.4. Last transfused in 3/2022 for Hgb in 6's range  Plan:  - ordered 2 units of blood prior to cath, f/u post transfusion cbc  - maintain active type and screen  - monitor H&H

## 2023-01-04 NOTE — PROGRESS NOTE ADULT - PROBLEM SELECTOR PLAN 1
Pt sent to [facility] ED 1/03/23 by her cardiologist Dr Jimenez for abnormal stress test  (2.7mm ST depressions in inferior and lateral leads and runs of possible VT with exercise, nuclear images are reportedly normal) with plans for medical optimization prior to cardiac cath on 1/4/23. HD stable, CP free, troponin neg x 1, EKG NSR with 1mm ST depression in II only. Possibly 2/2 ACS vs Anemia  Plan:  - cardiac cath on 1/4/23    - outpatient TTE on 1/3/2023 with nl EF, mild MR/TR    pre-cath IVF hydration at 75 cc/h x 12 hrs overnight  as per protocol

## 2023-01-05 ENCOUNTER — TRANSCRIPTION ENCOUNTER (OUTPATIENT)
Age: 73
End: 2023-01-05

## 2023-01-05 VITALS — TEMPERATURE: 98 F

## 2023-01-05 DIAGNOSIS — I48.91 UNSPECIFIED ATRIAL FIBRILLATION: ICD-10-CM

## 2023-01-05 PROBLEM — H54.40 BLINDNESS, ONE EYE, UNSPECIFIED EYE: Chronic | Status: ACTIVE | Noted: 2023-01-03

## 2023-01-05 PROBLEM — D63.8 ANEMIA IN OTHER CHRONIC DISEASES CLASSIFIED ELSEWHERE: Chronic | Status: ACTIVE | Noted: 2023-01-03

## 2023-01-05 PROBLEM — N18.30 CHRONIC KIDNEY DISEASE, STAGE 3 UNSPECIFIED: Chronic | Status: ACTIVE | Noted: 2023-01-03

## 2023-01-05 LAB
ALBUMIN SERPL ELPH-MCNC: 4.7 G/DL — SIGNIFICANT CHANGE UP (ref 3.3–5)
ALP SERPL-CCNC: 97 U/L — SIGNIFICANT CHANGE UP (ref 40–120)
ALT FLD-CCNC: 29 U/L — SIGNIFICANT CHANGE UP (ref 10–45)
ANION GAP SERPL CALC-SCNC: 9 MMOL/L — SIGNIFICANT CHANGE UP (ref 5–17)
ANISOCYTOSIS BLD QL: SLIGHT — SIGNIFICANT CHANGE UP
AST SERPL-CCNC: 21 U/L — SIGNIFICANT CHANGE UP (ref 10–40)
BASOPHILS # BLD AUTO: 0.13 K/UL — SIGNIFICANT CHANGE UP (ref 0–0.2)
BASOPHILS NFR BLD AUTO: 2.7 % — HIGH (ref 0–2)
BILIRUB SERPL-MCNC: 0.7 MG/DL — SIGNIFICANT CHANGE UP (ref 0.2–1.2)
BUN SERPL-MCNC: 20 MG/DL — SIGNIFICANT CHANGE UP (ref 7–23)
CALCIUM SERPL-MCNC: 9.6 MG/DL — SIGNIFICANT CHANGE UP (ref 8.4–10.5)
CHLORIDE SERPL-SCNC: 108 MMOL/L — SIGNIFICANT CHANGE UP (ref 96–108)
CO2 SERPL-SCNC: 22 MMOL/L — SIGNIFICANT CHANGE UP (ref 22–31)
CREAT SERPL-MCNC: 1.33 MG/DL — HIGH (ref 0.5–1.3)
DACRYOCYTES BLD QL SMEAR: SIGNIFICANT CHANGE UP
EGFR: 42 ML/MIN/1.73M2 — LOW
ELLIPTOCYTES BLD QL SMEAR: SLIGHT — SIGNIFICANT CHANGE UP
EOSINOPHIL # BLD AUTO: 0 K/UL — SIGNIFICANT CHANGE UP (ref 0–0.5)
EOSINOPHIL NFR BLD AUTO: 0 % — SIGNIFICANT CHANGE UP (ref 0–6)
GIANT PLATELETS BLD QL SMEAR: PRESENT — SIGNIFICANT CHANGE UP
GLUCOSE SERPL-MCNC: 88 MG/DL — SIGNIFICANT CHANGE UP (ref 70–99)
HCT VFR BLD CALC: 31.2 % — LOW (ref 34.5–45)
HGB BLD-MCNC: 10.1 G/DL — LOW (ref 11.5–15.5)
LYMPHOCYTES # BLD AUTO: 0.63 K/UL — LOW (ref 1–3.3)
LYMPHOCYTES # BLD AUTO: 13.5 % — SIGNIFICANT CHANGE UP (ref 13–44)
MAGNESIUM SERPL-MCNC: 2.2 MG/DL — SIGNIFICANT CHANGE UP (ref 1.6–2.6)
MANUAL SMEAR VERIFICATION: SIGNIFICANT CHANGE UP
MCHC RBC-ENTMCNC: 31.7 PG — SIGNIFICANT CHANGE UP (ref 27–34)
MCHC RBC-ENTMCNC: 32.4 GM/DL — SIGNIFICANT CHANGE UP (ref 32–36)
MCV RBC AUTO: 97.8 FL — SIGNIFICANT CHANGE UP (ref 80–100)
METAMYELOCYTES # FLD: 0.9 % — HIGH (ref 0–0)
MICROCYTES BLD QL: SLIGHT — SIGNIFICANT CHANGE UP
MONOCYTES # BLD AUTO: 0.13 K/UL — SIGNIFICANT CHANGE UP (ref 0–0.9)
MONOCYTES NFR BLD AUTO: 2.7 % — SIGNIFICANT CHANGE UP (ref 2–14)
MYELOCYTES NFR BLD: 4.5 % — HIGH (ref 0–0)
NEUTROPHILS # BLD AUTO: 3.54 K/UL — SIGNIFICANT CHANGE UP (ref 1.8–7.4)
NEUTROPHILS NFR BLD AUTO: 74.8 % — SIGNIFICANT CHANGE UP (ref 43–77)
NEUTS BAND # BLD: 0.9 % — SIGNIFICANT CHANGE UP (ref 0–8)
NRBC # BLD: 12 /100 — HIGH (ref 0–0)
NRBC # BLD: SIGNIFICANT CHANGE UP /100 WBCS (ref 0–0)
OVALOCYTES BLD QL SMEAR: SIGNIFICANT CHANGE UP
PHOSPHATE SERPL-MCNC: 3 MG/DL — SIGNIFICANT CHANGE UP (ref 2.5–4.5)
PLAT MORPH BLD: ABNORMAL
PLATELET # BLD AUTO: 189 K/UL — SIGNIFICANT CHANGE UP (ref 150–400)
POIKILOCYTOSIS BLD QL AUTO: SIGNIFICANT CHANGE UP
POLYCHROMASIA BLD QL SMEAR: SLIGHT — SIGNIFICANT CHANGE UP
POTASSIUM SERPL-MCNC: 4.3 MMOL/L — SIGNIFICANT CHANGE UP (ref 3.5–5.3)
POTASSIUM SERPL-SCNC: 4.3 MMOL/L — SIGNIFICANT CHANGE UP (ref 3.5–5.3)
PROT SERPL-MCNC: 7.6 G/DL — SIGNIFICANT CHANGE UP (ref 6–8.3)
RBC # BLD: 3.19 M/UL — LOW (ref 3.8–5.2)
RBC # FLD: 18.5 % — HIGH (ref 10.3–14.5)
RBC BLD AUTO: ABNORMAL
SODIUM SERPL-SCNC: 139 MMOL/L — SIGNIFICANT CHANGE UP (ref 135–145)
WBC # BLD: 4.67 K/UL — SIGNIFICANT CHANGE UP (ref 3.8–10.5)
WBC # FLD AUTO: 4.67 K/UL — SIGNIFICANT CHANGE UP (ref 3.8–10.5)

## 2023-01-05 PROCEDURE — 85730 THROMBOPLASTIN TIME PARTIAL: CPT

## 2023-01-05 PROCEDURE — C1887: CPT

## 2023-01-05 PROCEDURE — 83735 ASSAY OF MAGNESIUM: CPT

## 2023-01-05 PROCEDURE — 99285 EMERGENCY DEPT VISIT HI MDM: CPT | Mod: 25

## 2023-01-05 PROCEDURE — 84100 ASSAY OF PHOSPHORUS: CPT

## 2023-01-05 PROCEDURE — C1894: CPT

## 2023-01-05 PROCEDURE — 36415 COLL VENOUS BLD VENIPUNCTURE: CPT

## 2023-01-05 PROCEDURE — 80048 BASIC METABOLIC PNL TOTAL CA: CPT

## 2023-01-05 PROCEDURE — 86850 RBC ANTIBODY SCREEN: CPT

## 2023-01-05 PROCEDURE — 80053 COMPREHEN METABOLIC PANEL: CPT

## 2023-01-05 PROCEDURE — 82962 GLUCOSE BLOOD TEST: CPT

## 2023-01-05 PROCEDURE — 86900 BLOOD TYPING SEROLOGIC ABO: CPT

## 2023-01-05 PROCEDURE — 85610 PROTHROMBIN TIME: CPT

## 2023-01-05 PROCEDURE — P9040: CPT

## 2023-01-05 PROCEDURE — C1769: CPT

## 2023-01-05 PROCEDURE — 71045 X-RAY EXAM CHEST 1 VIEW: CPT

## 2023-01-05 PROCEDURE — 36430 TRANSFUSION BLD/BLD COMPNT: CPT

## 2023-01-05 PROCEDURE — 99233 SBSQ HOSP IP/OBS HIGH 50: CPT

## 2023-01-05 PROCEDURE — 86923 COMPATIBILITY TEST ELECTRIC: CPT

## 2023-01-05 PROCEDURE — 87635 SARS-COV-2 COVID-19 AMP PRB: CPT

## 2023-01-05 PROCEDURE — 86901 BLOOD TYPING SEROLOGIC RH(D): CPT

## 2023-01-05 PROCEDURE — 85025 COMPLETE CBC W/AUTO DIFF WBC: CPT

## 2023-01-05 PROCEDURE — 85027 COMPLETE CBC AUTOMATED: CPT

## 2023-01-05 PROCEDURE — 84484 ASSAY OF TROPONIN QUANT: CPT

## 2023-01-05 PROCEDURE — 80061 LIPID PANEL: CPT

## 2023-01-05 RX ORDER — ATORVASTATIN CALCIUM 80 MG/1
1 TABLET, FILM COATED ORAL
Qty: 30 | Refills: 0
Start: 2023-01-05 | End: 2023-02-03

## 2023-01-05 RX ORDER — METOPROLOL TARTRATE 50 MG
1 TABLET ORAL
Qty: 60 | Refills: 0
Start: 2023-01-05 | End: 2023-02-03

## 2023-01-05 RX ORDER — AMIODARONE HYDROCHLORIDE 400 MG/1
1 TABLET ORAL
Qty: 40 | Refills: 0
Start: 2023-01-05 | End: 2023-02-03

## 2023-01-05 RX ORDER — AMLODIPINE BESYLATE 2.5 MG/1
1 TABLET ORAL
Qty: 0 | Refills: 0 | DISCHARGE

## 2023-01-05 RX ORDER — AMIODARONE HYDROCHLORIDE 400 MG/1
200 TABLET ORAL EVERY 12 HOURS
Refills: 0 | Status: DISCONTINUED | OUTPATIENT
Start: 2023-01-05 | End: 2023-01-05

## 2023-01-05 RX ORDER — LOSARTAN POTASSIUM 100 MG/1
1 TABLET, FILM COATED ORAL
Qty: 0 | Refills: 0 | DISCHARGE

## 2023-01-05 RX ORDER — AMLODIPINE BESYLATE 2.5 MG/1
1 TABLET ORAL
Qty: 30 | Refills: 0
Start: 2023-01-05 | End: 2023-02-03

## 2023-01-05 RX ORDER — HYDROCHLOROTHIAZIDE 25 MG
1 TABLET ORAL
Qty: 0 | Refills: 0 | DISCHARGE

## 2023-01-05 RX ORDER — APIXABAN 2.5 MG/1
1 TABLET, FILM COATED ORAL
Qty: 60 | Refills: 0
Start: 2023-01-05 | End: 2023-02-03

## 2023-01-05 RX ORDER — ATORVASTATIN CALCIUM 80 MG/1
1 TABLET, FILM COATED ORAL
Qty: 0 | Refills: 0 | DISCHARGE

## 2023-01-05 RX ORDER — ATORVASTATIN CALCIUM 80 MG/1
10 TABLET, FILM COATED ORAL AT BEDTIME
Refills: 0 | Status: DISCONTINUED | OUTPATIENT
Start: 2023-01-06 | End: 2023-01-05

## 2023-01-05 RX ADMIN — Medication 1000 MILLIGRAM(S): at 12:02

## 2023-01-05 RX ADMIN — APIXABAN 5 MILLIGRAM(S): 2.5 TABLET, FILM COATED ORAL at 05:01

## 2023-01-05 RX ADMIN — Medication 25 MILLIGRAM(S): at 05:01

## 2023-01-05 RX ADMIN — Medication 650 MILLIGRAM(S): at 17:16

## 2023-01-05 RX ADMIN — AMIODARONE HYDROCHLORIDE 200 MILLIGRAM(S): 400 TABLET ORAL at 12:32

## 2023-01-05 RX ADMIN — Medication 650 MILLIGRAM(S): at 05:01

## 2023-01-05 RX ADMIN — Medication 25 MILLIGRAM(S): at 17:16

## 2023-01-05 RX ADMIN — APIXABAN 5 MILLIGRAM(S): 2.5 TABLET, FILM COATED ORAL at 17:16

## 2023-01-05 RX ADMIN — CALCITRIOL 0.25 MICROGRAM(S): 0.5 CAPSULE ORAL at 12:01

## 2023-01-05 NOTE — DISCHARGE NOTE NURSING/CASE MANAGEMENT/SOCIAL WORK - PATIENT PORTAL LINK FT
You can access the FollowMyHealth Patient Portal offered by [facility] by registering at the following website: [email or web address]. By joining [facility]’s FollowMyHealth portal, you will also be able to view your health information using other applications (apps) compatible with our system.

## 2023-01-05 NOTE — DISCHARGE NOTE NURSING/CASE MANAGEMENT/SOCIAL WORK - NSDCFUADDAPPT_GEN_ALL_CORE_FT
Appointment w/ Dr. Jimenez on 01/19/23 at 2PM  [address] [address] [phone]    EP will reach out to you to schedule an appointment for management of your a-fib
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
## 2023-01-05 NOTE — DISCHARGE NOTE NURSING/CASE MANAGEMENT/SOCIAL WORK - NSDCPEFALRISK_GEN_ALL_CORE
For information on Fall & Injury Prevention, visit: [email or web address] OR  [email or web address] OR  https://www.cdc.gov/steadi/patient.html

## 2023-01-11 DIAGNOSIS — I12.9 HYPERTENSIVE CHRONIC KIDNEY DISEASE WITH STAGE 1 THROUGH STAGE 4 CHRONIC KIDNEY DISEASE, OR UNSPECIFIED CHRONIC KIDNEY DISEASE: ICD-10-CM

## 2023-01-11 DIAGNOSIS — N18.30 CHRONIC KIDNEY DISEASE, STAGE 3 UNSPECIFIED: ICD-10-CM

## 2023-01-11 DIAGNOSIS — D75.81 MYELOFIBROSIS: ICD-10-CM

## 2023-01-11 DIAGNOSIS — E78.5 HYPERLIPIDEMIA, UNSPECIFIED: ICD-10-CM

## 2023-01-11 DIAGNOSIS — I24.8 OTHER FORMS OF ACUTE ISCHEMIC HEART DISEASE: ICD-10-CM

## 2023-01-11 DIAGNOSIS — D46.9 MYELODYSPLASTIC SYNDROME, UNSPECIFIED: ICD-10-CM

## 2023-01-11 DIAGNOSIS — R94.39 ABNORMAL RESULT OF OTHER CARDIOVASCULAR FUNCTION STUDY: ICD-10-CM

## 2023-01-11 DIAGNOSIS — I48.91 UNSPECIFIED ATRIAL FIBRILLATION: ICD-10-CM

## 2023-01-11 DIAGNOSIS — D63.8 ANEMIA IN OTHER CHRONIC DISEASES CLASSIFIED ELSEWHERE: ICD-10-CM

## 2023-01-11 DIAGNOSIS — H54.62 UNQUALIFIED VISUAL LOSS, LEFT EYE, NORMAL VISION RIGHT EYE: ICD-10-CM

## 2023-01-19 ENCOUNTER — NON-APPOINTMENT (OUTPATIENT)
Age: 73
End: 2023-01-19

## 2023-01-19 ENCOUNTER — APPOINTMENT (OUTPATIENT)
Dept: HEART AND VASCULAR | Facility: CLINIC | Age: 73
End: 2023-01-19
Payer: MEDICARE

## 2023-01-19 VITALS
BODY MASS INDEX: 23.49 KG/M2 | SYSTOLIC BLOOD PRESSURE: 144 MMHG | WEIGHT: 141 LBS | DIASTOLIC BLOOD PRESSURE: 80 MMHG | HEIGHT: 65 IN

## 2023-01-19 VITALS
BODY MASS INDEX: 23.49 KG/M2 | DIASTOLIC BLOOD PRESSURE: 70 MMHG | WEIGHT: 141 LBS | TEMPERATURE: 98.3 F | HEIGHT: 65 IN | OXYGEN SATURATION: 99 % | HEART RATE: 65 BPM | SYSTOLIC BLOOD PRESSURE: 142 MMHG

## 2023-01-19 PROCEDURE — 93000 ELECTROCARDIOGRAM COMPLETE: CPT

## 2023-01-19 PROCEDURE — 99214 OFFICE O/P EST MOD 30 MIN: CPT

## 2023-01-19 NOTE — PHYSICAL EXAM
[Well Developed] : well developed [Well Nourished] : well nourished [Normal S1, S2] : normal S1, S2 [No Murmur] : no murmur [Clear Lung Fields] : clear lung fields [Good Air Entry] : good air entry [Soft] : abdomen soft [Non Tender] : non-tender [Normal Gait] : normal gait [No Edema] : no edema [Moves all extremities] : moves all extremities [Alert and Oriented] : alert and oriented

## 2023-01-20 NOTE — END OF VISIT
[] : Resident [FreeTextEntry3] : Patient seen and examined. Case discussed with resident. Agree with plan as detailed above.

## 2023-01-20 NOTE — ASSESSMENT
[FreeTextEntry1] : Ms. Swanson is a 71yo W w/ PMHx of HTN, HLD, CKD, Anemia 2/2 myelofibrosis on ruxolitinib, and newly diagnosed atrial fibrillation who presents for follow-up post [facility] admission.\par \par #Atrial Fibrillation\par Repeat EKG shows sinus krysten, asymptomatic - denies HA, dizziness, CP, palpitations, SOB/PAGAN, or LE swelling. Has not been taking amio due to potential adverse drug interaction. \par Plan:\par - C/w metoprolol tartate 25 mg BID\par - C/w eliquis 5 mg Qd; verify with insurance of medication coverage -- pt pending repeat labs to be done with her hematologist on 1/20 - pt prefers not to have another lab draw today; based on those labs, will see if apixaban dose needs to be adjusted, weight is borderline and Cr has been borderline also \par - given lower HRs in sinus, will hold off on restarting amio for now \par - Refer to EP -- was to get a call from EP for scheduling but hasn't yet\par \par #HTN\par Repeat /80 with manual cuff. No repeat labs, holding losartan and HCTZ. Plan to have repeat labs with Dr. Stroud. Currently asymptomatic.\par Plan:\par - pt reports eating more salty food recently, has not been checking home BPs\par - discussed restarting losartan vs waiting for labs/home BPs -- pt prefers waiting for labs before restarting but she will limit salt and start checking home BPs\par \par RTC in March before [place] visit
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
## 2023-01-20 NOTE — CARDIOLOGY SUMMARY
[de-identified] : \par 12/16/22 EKG:NSR 71 bpm\par 1/19/23 EKG: sinus krysten ~55 bpm [de-identified] : \par 1/3/23 Nuc Stress: normal imaging, normal EF, no chest pain, ST depressions, two runs of probable SVT [de-identified] : \par 1/10/2020 TTE: normal LV/RV size/fxn, severely dilated LA, mild TR, no pericardial effusion\par 1/3/23 TTE: normal LV function, EF 60-65%, grade 2 DD, prbly normal RV, mildly dilated LA, mild MR, mild-mod TR, PASP 40mmHg, mild PH, trace pericardial effusion [de-identified] : \par 1/4/23 LHC: no sig CAD

## 2023-01-30 ENCOUNTER — NON-APPOINTMENT (OUTPATIENT)
Age: 73
End: 2023-01-30

## 2023-02-13 ENCOUNTER — APPOINTMENT (OUTPATIENT)
Dept: NEPHROLOGY | Facility: CLINIC | Age: 73
End: 2023-02-13
Payer: MEDICARE

## 2023-02-13 ENCOUNTER — OUTPATIENT (OUTPATIENT)
Dept: OUTPATIENT SERVICES | Facility: HOSPITAL | Age: 73
LOS: 1 days | End: 2023-02-13
Payer: MEDICARE

## 2023-02-13 ENCOUNTER — APPOINTMENT (OUTPATIENT)
Dept: RADIOLOGY | Facility: HOSPITAL | Age: 73
End: 2023-02-13
Payer: MEDICARE

## 2023-02-13 VITALS
HEIGHT: 65 IN | WEIGHT: 141 LBS | SYSTOLIC BLOOD PRESSURE: 142 MMHG | BODY MASS INDEX: 23.49 KG/M2 | HEART RATE: 68 BPM | DIASTOLIC BLOOD PRESSURE: 69 MMHG
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
## 2023-02-13 PROCEDURE — 77080 DXA BONE DENSITY AXIAL: CPT | Mod: 26

## 2023-02-13 PROCEDURE — 99214 OFFICE O/P EST MOD 30 MIN: CPT

## 2023-02-13 PROCEDURE — 77080 DXA BONE DENSITY AXIAL: CPT

## 2023-02-13 NOTE — PHYSICAL EXAM
[General Appearance - Alert] : alert [General Appearance - In No Acute Distress] : in no acute distress [Sclera] : the sclera and conjunctiva were normal [PERRL With Normal Accommodation] : pupils were equal in size, round, and reactive to light [Extraocular Movements] : extraocular movements were intact [FreeTextEntry1] : left eye abnormal [Outer Ear] : the ears and nose were normal in appearance [Oropharynx] : the oropharynx was normal [Neck Appearance] : the appearance of the neck was normal [Neck Cervical Mass (___cm)] : no neck mass was observed [Jugular Venous Distention Increased] : there was no jugular-venous distention [Auscultation Breath Sounds / Voice Sounds] : lungs were clear to auscultation bilaterally [Heart Rate And Rhythm] : heart rate was normal and rhythm regular [Heart Sounds] : normal S1 and S2 [Heart Sounds Gallop] : no gallops [Murmurs] : no murmurs [Heart Sounds Pericardial Friction Rub] : no pericardial rub [Edema] : there was no peripheral edema [Full Pulse] : the pedal pulses are present [No CVA Tenderness] : no ~M costovertebral angle tenderness [No Spinal Tenderness] : no spinal tenderness [Skin Color & Pigmentation] : normal skin color and pigmentation [Skin Turgor] : normal skin turgor [] : no rash [Deep Tendon Reflexes (DTR)] : deep tendon reflexes were 2+ and symmetric [Sensation] : the sensory exam was normal to light touch and pinprick [No Focal Deficits] : no focal deficits [Oriented To Time, Place, And Person] : oriented to person, place, and time [Impaired Insight] : insight and judgment were intact [Affect] : the affect was normal

## 2023-02-13 NOTE — ASSESSMENT
[FreeTextEntry1] : 72-year-old woman with stable/improved kidney function, adequate BP control, and good rate control in A-fib.  She will return in 4 to 5 months.
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
## 2023-02-15 ENCOUNTER — NON-APPOINTMENT (OUTPATIENT)
Age: 73
End: 2023-02-15

## 2023-02-21 ENCOUNTER — TRANSCRIPTION ENCOUNTER (OUTPATIENT)
Age: 73
End: 2023-02-21

## 2023-03-01 ENCOUNTER — APPOINTMENT (OUTPATIENT)
Dept: HEART AND VASCULAR | Facility: CLINIC | Age: 73
End: 2023-03-01
Payer: MEDICARE

## 2023-03-01 VITALS
SYSTOLIC BLOOD PRESSURE: 124 MMHG | HEIGHT: 65 IN | BODY MASS INDEX: 23.32 KG/M2 | HEART RATE: 69 BPM | WEIGHT: 140 LBS | DIASTOLIC BLOOD PRESSURE: 69 MMHG

## 2023-03-01 PROCEDURE — 99214 OFFICE O/P EST MOD 30 MIN: CPT
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
## 2023-03-01 NOTE — CARDIOLOGY SUMMARY
[de-identified] : \par 12/16/22 EKG:NSR 71 bpm\par 1/19/23 EKG: sinus krysten ~55 bpm [de-identified] : \par 1/3/23 Nuc Stress: normal imaging, normal EF, no chest pain, ST depressions, two runs of probable SVT [de-identified] : \par 1/10/2020 TTE: normal LV/RV size/fxn, severely dilated LA, mild TR, no pericardial effusion\par 1/3/23 TTE: normal LV function, EF 60-65%, grade 2 DD, prbly normal RV, mildly dilated LA, mild MR, mild-mod TR, PASP 40mmHg, mild PH, trace pericardial effusion [de-identified] : \par 1/4/23 LHC: no sig CAD

## 2023-03-01 NOTE — DISCUSSION/SUMMARY
[FreeTextEntry1] : Kusum Swanson is a 71yo F w/ PMHx of HTN, HLD, CKD, Anemia 2/2 myelofibrosis on ruxolitinib, normal cors on LHC in Jan 2023, and newly diagnosed atrial fibrillation on apixaban who presents for follow-up. \par \par #Atrial fibrillation:\par -has been off amiodarone\par -restarting apixaban - will verify re: insurance coverage, in the meantime gave samples\par -f/u with EP as planned\par -continue metoprolol\par \par #HTN:\par -BP at goal on amlodipine alone; no further losartan/hctz\par -CMP ordered per internist request\par \par #HL: \par -LDL on atorvastatin, normal cors on recent LHC, LDL 54 in Jan 2023\par \par Follow-up 6 months or sooner as needed

## 2023-03-02 ENCOUNTER — TRANSCRIPTION ENCOUNTER (OUTPATIENT)
Age: 73
End: 2023-03-02

## 2023-03-02 LAB
ALBUMIN SERPL ELPH-MCNC: 4.9 G/DL
ALP BLD-CCNC: 87 U/L
ALT SERPL-CCNC: 26 U/L
ANION GAP SERPL CALC-SCNC: 15 MMOL/L
AST SERPL-CCNC: 19 U/L
BILIRUB SERPL-MCNC: 0.6 MG/DL
BUN SERPL-MCNC: 25 MG/DL
CALCIUM SERPL-MCNC: 9.3 MG/DL
CHLORIDE SERPL-SCNC: 103 MMOL/L
CO2 SERPL-SCNC: 22 MMOL/L
CREAT SERPL-MCNC: 1.58 MG/DL
EGFR: 34 ML/MIN/1.73M2
GLUCOSE SERPL-MCNC: 92 MG/DL
POTASSIUM SERPL-SCNC: 4.4 MMOL/L
PROT SERPL-MCNC: 7.4 G/DL
SODIUM SERPL-SCNC: 141 MMOL/L

## 2023-03-03 ENCOUNTER — APPOINTMENT (OUTPATIENT)
Dept: HEART AND VASCULAR | Facility: CLINIC | Age: 73
End: 2023-03-03
Payer: MEDICARE

## 2023-03-03 ENCOUNTER — NON-APPOINTMENT (OUTPATIENT)
Age: 73
End: 2023-03-03

## 2023-03-03 VITALS
WEIGHT: 140 LBS | BODY MASS INDEX: 23.32 KG/M2 | HEART RATE: 72 BPM | HEIGHT: 65 IN | DIASTOLIC BLOOD PRESSURE: 60 MMHG | TEMPERATURE: 95 F | SYSTOLIC BLOOD PRESSURE: 131 MMHG

## 2023-03-03 PROCEDURE — 93000 ELECTROCARDIOGRAM COMPLETE: CPT

## 2023-03-03 PROCEDURE — 99214 OFFICE O/P EST MOD 30 MIN: CPT

## 2023-03-03 RX ORDER — DORZOLAMIDE HYDROCHLORIDE 20 MG/ML
2 SOLUTION OPHTHALMIC
Qty: 10 | Refills: 0 | Status: ACTIVE | COMMUNITY
Start: 2022-06-14

## 2023-03-03 RX ORDER — METOPROLOL TARTRATE 25 MG/1
25 TABLET, FILM COATED ORAL
Qty: 60 | Refills: 1 | Status: DISCONTINUED | COMMUNITY
End: 2023-03-03

## 2023-03-03 NOTE — DISCUSSION/SUMMARY
[EKG obtained to assist in diagnosis and management of assessed problem(s)] : EKG obtained to assist in diagnosis and management of assessed problem(s) [FreeTextEntry1] : I, Dr. Jose Reynoso, personally performed the services described in the documentation, reviewed the documentation as recorded by the scribe, in my presence.  It accurately records my words and actions.\par \par I, CRISTINA Romero, am scribing for and in the presence of DR. REYNOSO for the following sections: history of present illness, past medical/surgical/family/social history, medication reconciliation, allergies, review of systems, vital signs, physical exam and disposition.\par
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
## 2023-03-03 NOTE — CARDIOLOGY SUMMARY
[de-identified] : 12/16/22 EKG:NSR 71 bpm\par 1/19/23 EKG: sinus krysten ~55 bpm\par 3/3/23: NSR 70 bpm. Normal intervals ( ms, QRS 98 ms , QTc 409 ms)  [de-identified] : \par 1/3/23 Nuc Stress: normal imaging, normal EF, no chest pain, ST depressions, two runs of probable SVT [de-identified] : \par 1/10/2020 TTE: normal LV/RV size/fxn, severely dilated LA, mild TR, no pericardial effusion\par 1/3/23 TTE: normal LV function, EF 60-65%, grade 2 DD, prbly normal RV, mildly dilated LA, mild MR, mild-mod TR, PASP 40mmHg, mild PH, trace pericardial effusion [de-identified] : \par 1/4/23 LHC: no sig CAD

## 2023-04-09 ENCOUNTER — NON-APPOINTMENT (OUTPATIENT)
Age: 73
End: 2023-04-09

## 2023-04-12 ENCOUNTER — NON-APPOINTMENT (OUTPATIENT)
Age: 73
End: 2023-04-12

## 2023-04-12 ENCOUNTER — APPOINTMENT (OUTPATIENT)
Dept: INFUSION THERAPY | Facility: CLINIC | Age: 73
End: 2023-04-12

## 2023-04-12 ENCOUNTER — OUTPATIENT (OUTPATIENT)
Dept: OUTPATIENT SERVICES | Facility: HOSPITAL | Age: 73
LOS: 1 days | End: 2023-04-12
Payer: MEDICARE
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
## 2023-04-12 DIAGNOSIS — D64.9 ANEMIA, UNSPECIFIED: ICD-10-CM

## 2023-04-12 PROCEDURE — 86900 BLOOD TYPING SEROLOGIC ABO: CPT

## 2023-04-12 PROCEDURE — 86850 RBC ANTIBODY SCREEN: CPT

## 2023-04-12 PROCEDURE — 36415 COLL VENOUS BLD VENIPUNCTURE: CPT

## 2023-04-12 PROCEDURE — 86901 BLOOD TYPING SEROLOGIC RH(D): CPT

## 2023-04-13 ENCOUNTER — APPOINTMENT (OUTPATIENT)
Dept: INFUSION THERAPY | Facility: CLINIC | Age: 73
End: 2023-04-13

## 2023-04-13 ENCOUNTER — OUTPATIENT (OUTPATIENT)
Dept: OUTPATIENT SERVICES | Facility: HOSPITAL | Age: 73
LOS: 1 days | End: 2023-04-13
Payer: MEDICARE

## 2023-04-13 VITALS
OXYGEN SATURATION: 99 % | RESPIRATION RATE: 18 BRPM | TEMPERATURE: 98 F | DIASTOLIC BLOOD PRESSURE: 70 MMHG | HEART RATE: 74 BPM | SYSTOLIC BLOOD PRESSURE: 127 MMHG

## 2023-04-13 VITALS
OXYGEN SATURATION: 99 % | SYSTOLIC BLOOD PRESSURE: 124 MMHG | DIASTOLIC BLOOD PRESSURE: 74 MMHG | TEMPERATURE: 98 F | HEART RATE: 77 BPM | HEIGHT: 66 IN | WEIGHT: 136.91 LBS | RESPIRATION RATE: 18 BRPM

## 2023-04-13 DIAGNOSIS — D64.9 ANEMIA, UNSPECIFIED: ICD-10-CM
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
## 2023-04-13 PROCEDURE — 36430 TRANSFUSION BLD/BLD COMPNT: CPT

## 2023-04-13 PROCEDURE — P9040: CPT

## 2023-04-14 ENCOUNTER — APPOINTMENT (OUTPATIENT)
Dept: ENDOCRINOLOGY | Facility: CLINIC | Age: 73
End: 2023-04-14
Payer: MEDICARE

## 2023-04-14 VITALS
DIASTOLIC BLOOD PRESSURE: 71 MMHG | WEIGHT: 134 LBS | BODY MASS INDEX: 22.3 KG/M2 | HEART RATE: 77 BPM | SYSTOLIC BLOOD PRESSURE: 163 MMHG

## 2023-04-14 PROCEDURE — 99204 OFFICE O/P NEW MOD 45 MIN: CPT | Mod: GC

## 2023-04-14 NOTE — END OF VISIT
[] : Fellow [FreeTextEntry3] : Agree with Dr. Milli Langley assessment above. The patient has osteoporosis based on T score of the 1/3 radius. Patient also has CKD. 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  Options for treatment including oral bisphosphonates and denosumab were discussed. While she does have CKD, her GFR is not a complete contraindication to renal dosed oral bisphosphonate.  However, the patient reports increased pill burden and is ameanble to denosumab. Side effects including hypoglycemia/ONJ/AFF were discussed. Educated about the importance of fall precautions. She is travelling and will return in six week. Insurance authorization to be obtained by Dr. Morley. When she returns from [facility], she will call to schedule the injection [Time Spent: ___ minutes] : I have spent [unfilled] minutes of time on the encounter. [>50% of the face to face encounter time was spent on counseling and/or coordination of care for ___] : Greater than 50% of the face to face encounter time was spent on counseling and/or coordination of care for [unfilled]

## 2023-04-14 NOTE — REVIEW OF SYSTEMS
[Fatigue] : no fatigue [Chest Pain] : no chest pain [Shortness Of Breath] : no shortness of breath [Nausea] : no nausea [Vomiting] : no vomiting

## 2023-04-14 NOTE — ASSESSMENT
[Denosumab Therapy] : Risks  and benefits of denosumab therapy were discussed with the patient including eczema, cellulitis, osteonecrosis of the jaw and atypical femur fractures [FreeTextEntry1] : Ms. Swanson is a 72-year-old female with a past medical history of hypertension, CKD stage 3b (with secondary hyperparathyroidism), Polycythemia/myelofibrosis (over the past 30 years, following with Dr. Houser) who presents to clinic for evaluation of newly diagnosed osteoporosis. \par \par # Osteoporosis \par - She has CKD 3b with GFR of 33. 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 PTH was 111 (11/2022). \par - The patient reports having prior history of ankle fracture after mechanical fall in 2015. \par - Discussed different treatment options including bisphosphonates versus prolia. Patient preferred continuing with Prolia. \par - Will prescribe Denosumab and check Vitamin D25, D1,25 and BMP (for calcium) prior to administration of medication. The patient will be out of the country for the next 6 weeks. Will receive medication when she comes back. \par -Educated about the importance of fall precautions\par - Discussed about safety measures to prevent falls. She verbalized understanding. \par \par RTC in 2 months.\par She is going to [facility] next week and will be staying there for 6 weeks

## 2023-04-14 NOTE — PHYSICAL EXAM
[Alert] : alert [Well Nourished] : well nourished [No Acute Distress] : no acute distress [Normal Sclera/Conjunctiva] : normal sclera/conjunctiva [Thyroid Not Enlarged] : the thyroid was not enlarged [No Thyroid Nodules] : no palpable thyroid nodules [No Respiratory Distress] : no respiratory distress [No Accessory Muscle Use] : no accessory muscle use [Clear to Auscultation] : lungs were clear to auscultation bilaterally [Normal S1, S2] : normal S1 and S2 [Normal Rate] : heart rate was normal [Regular Rhythm] : with a regular rhythm [No Edema] : no peripheral edema [No Stigmata of Cushings Syndrome] : no stigmata of Cushings Syndrome [No Rash] : no rash [Oriented x3] : oriented to person, place, and time [Normal Gait] : normal gait [Acanthosis Nigricans] : no acanthosis nigricans [Normal Affect] : the affect was normal [Normal Mood] : the mood was normal

## 2023-04-26 LAB
24R-OH-CALCIDIOL SERPL-MCNC: 42.4 PG/ML
25(OH)D3 SERPL-MCNC: 46.1 NG/ML
ANION GAP SERPL CALC-SCNC: 14 MMOL/L
BUN SERPL-MCNC: 26 MG/DL
CALCIUM SERPL-MCNC: 9.4 MG/DL
CHLORIDE SERPL-SCNC: 110 MMOL/L
CO2 SERPL-SCNC: 22 MMOL/L
CREAT SERPL-MCNC: 1.4 MG/DL
EGFR: 40 ML/MIN/1.73M2
GLUCOSE SERPL-MCNC: 89 MG/DL
POTASSIUM SERPL-SCNC: 4.7 MMOL/L
SODIUM SERPL-SCNC: 145 MMOL/L

## 2023-05-31 ENCOUNTER — OUTPATIENT (OUTPATIENT)
Dept: OUTPATIENT SERVICES | Facility: HOSPITAL | Age: 73
LOS: 1 days | End: 2023-05-31
Payer: MEDICARE

## 2023-05-31 ENCOUNTER — APPOINTMENT (OUTPATIENT)
Dept: INFUSION THERAPY | Facility: CLINIC | Age: 73
End: 2023-05-31
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
## 2023-05-31 DIAGNOSIS — D64.9 ANEMIA, UNSPECIFIED: ICD-10-CM

## 2023-06-01 ENCOUNTER — APPOINTMENT (OUTPATIENT)
Dept: INFUSION THERAPY | Facility: CLINIC | Age: 73
End: 2023-06-01

## 2023-06-01 ENCOUNTER — OUTPATIENT (OUTPATIENT)
Dept: OUTPATIENT SERVICES | Facility: HOSPITAL | Age: 73
LOS: 1 days | End: 2023-06-01
Payer: MEDICARE

## 2023-06-01 VITALS
DIASTOLIC BLOOD PRESSURE: 75 MMHG | TEMPERATURE: 97 F | SYSTOLIC BLOOD PRESSURE: 120 MMHG | HEART RATE: 78 BPM | OXYGEN SATURATION: 99 % | RESPIRATION RATE: 18 BRPM

## 2023-06-01 DIAGNOSIS — D64.9 ANEMIA, UNSPECIFIED: ICD-10-CM
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
## 2023-06-01 PROCEDURE — 36415 COLL VENOUS BLD VENIPUNCTURE: CPT

## 2023-06-01 PROCEDURE — 86850 RBC ANTIBODY SCREEN: CPT

## 2023-06-01 PROCEDURE — P9040: CPT

## 2023-06-01 PROCEDURE — 86923 COMPATIBILITY TEST ELECTRIC: CPT

## 2023-06-01 PROCEDURE — 86901 BLOOD TYPING SEROLOGIC RH(D): CPT

## 2023-06-01 PROCEDURE — 36430 TRANSFUSION BLD/BLD COMPNT: CPT

## 2023-06-01 PROCEDURE — 86900 BLOOD TYPING SEROLOGIC ABO: CPT

## 2023-06-06 ENCOUNTER — APPOINTMENT (OUTPATIENT)
Dept: INTERNAL MEDICINE | Facility: CLINIC | Age: 73
End: 2023-06-06
Payer: MEDICARE

## 2023-06-06 ENCOUNTER — RESULT CHARGE (OUTPATIENT)
Age: 73
End: 2023-06-06

## 2023-06-06 VITALS
RESPIRATION RATE: 16 BRPM | TEMPERATURE: 98 F | OXYGEN SATURATION: 97 % | WEIGHT: 131 LBS | SYSTOLIC BLOOD PRESSURE: 131 MMHG | HEART RATE: 82 BPM | HEIGHT: 65 IN | DIASTOLIC BLOOD PRESSURE: 69 MMHG | BODY MASS INDEX: 21.83 KG/M2

## 2023-06-06 VITALS
SYSTOLIC BLOOD PRESSURE: 131 MMHG | OXYGEN SATURATION: 97 % | DIASTOLIC BLOOD PRESSURE: 69 MMHG | RESPIRATION RATE: 16 BRPM | BODY MASS INDEX: 21.83 KG/M2 | HEIGHT: 65 IN | WEIGHT: 131 LBS | TEMPERATURE: 98 F | HEART RATE: 82 BPM

## 2023-06-06 DIAGNOSIS — R35.89 OTHER POLYURIA: ICD-10-CM

## 2023-06-06 LAB
BILIRUB UR QL STRIP: NEGATIVE
CLARITY UR: CLEAR
COLLECTION METHOD: NORMAL
GLUCOSE UR-MCNC: NEGATIVE
HBA1C MFR BLD HPLC: 5
HCG UR QL: 0.2 EU/DL
HGB UR QL STRIP.AUTO: NEGATIVE
KETONES UR-MCNC: NEGATIVE
LEUKOCYTE ESTERASE UR QL STRIP: NEGATIVE
NITRITE UR QL STRIP: NEGATIVE
PH UR STRIP: 6
PROT UR STRIP-MCNC: NORMAL
SP GR UR STRIP: 1.02

## 2023-06-06 PROCEDURE — 36415 COLL VENOUS BLD VENIPUNCTURE: CPT

## 2023-06-06 PROCEDURE — 99214 OFFICE O/P EST MOD 30 MIN: CPT | Mod: GC,25

## 2023-06-06 NOTE — END OF VISIT
[] : Resident [FreeTextEntry3] :  72 year old F presenting for follow up. Reports urinary frequency x 1 month. Initially accompanied by dysuria, had urine culture performed by hematologist Dr. Stroud and was treated w/ macrobid, burning resolved. However, continues to have urinary frequency, reports getting up to urinate every 1.5 hours last night. Well appearing. POC UA and A1C normal today.  Referred to urology for possible overactive bladder.
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
## 2023-06-06 NOTE — PHYSICAL EXAM
[No Acute Distress] : no acute distress [Well Nourished] : well nourished [Well Developed] : well developed [Well-Appearing] : well-appearing [Normal Sclera/Conjunctiva] : normal sclera/conjunctiva [PERRL] : pupils equal round and reactive to light [EOMI] : extraocular movements intact [Normal Outer Ear/Nose] : the outer ears and nose were normal in appearance [Normal Oropharynx] : the oropharynx was normal [No JVD] : no jugular venous distention [No Lymphadenopathy] : no lymphadenopathy [Supple] : supple [Thyroid Normal, No Nodules] : the thyroid was normal and there were no nodules present [No Respiratory Distress] : no respiratory distress  [No Accessory Muscle Use] : no accessory muscle use [Clear to Auscultation] : lungs were clear to auscultation bilaterally [Normal Rate] : normal rate  [Regular Rhythm] : with a regular rhythm [Normal S1, S2] : normal S1 and S2 [No Murmur] : no murmur heard [No Carotid Bruits] : no carotid bruits [No Abdominal Bruit] : a ~M bruit was not heard ~T in the abdomen [No Varicosities] : no varicosities [Pedal Pulses Present] : the pedal pulses are present [No Edema] : there was no peripheral edema [No Palpable Aorta] : no palpable aorta [No Extremity Clubbing/Cyanosis] : no extremity clubbing/cyanosis [Soft] : abdomen soft [Non Tender] : non-tender [Non-distended] : non-distended [No Masses] : no abdominal mass palpated [No HSM] : no HSM [Normal Bowel Sounds] : normal bowel sounds [Normal Posterior Cervical Nodes] : no posterior cervical lymphadenopathy [Normal Anterior Cervical Nodes] : no anterior cervical lymphadenopathy [No CVA Tenderness] : no CVA  tenderness [No Spinal Tenderness] : no spinal tenderness [No Joint Swelling] : no joint swelling [Grossly Normal Strength/Tone] : grossly normal strength/tone [No Rash] : no rash [Coordination Grossly Intact] : coordination grossly intact [No Focal Deficits] : no focal deficits [Normal Gait] : normal gait [Deep Tendon Reflexes (DTR)] : deep tendon reflexes were 2+ and symmetric [Normal Affect] : the affect was normal [Normal Insight/Judgement] : insight and judgment were intact [de-identified] : Neg CVA tenderness

## 2023-06-11 ENCOUNTER — NON-APPOINTMENT (OUTPATIENT)
Age: 73
End: 2023-06-11

## 2023-06-13 ENCOUNTER — APPOINTMENT (OUTPATIENT)
Dept: NEPHROLOGY | Facility: CLINIC | Age: 73
End: 2023-06-13
Payer: MEDICARE

## 2023-06-13 VITALS
DIASTOLIC BLOOD PRESSURE: 61 MMHG | SYSTOLIC BLOOD PRESSURE: 122 MMHG | WEIGHT: 131 LBS | HEART RATE: 80 BPM | BODY MASS INDEX: 21.83 KG/M2 | HEIGHT: 65 IN

## 2023-06-13 DIAGNOSIS — R80.1 PERSISTENT PROTEINURIA, UNSPECIFIED: ICD-10-CM

## 2023-06-13 PROCEDURE — 99214 OFFICE O/P EST MOD 30 MIN: CPT

## 2023-06-13 NOTE — CONSULT LETTER
[Dear  ___] : Dear  [unfilled], [Consult Letter:] : I had the pleasure of evaluating your patient, [unfilled]. [Please see my note below.] : Please see my note below. [Consult Closing:] : Thank you very much for allowing me to participate in the care of this patient.  If you have any questions, please do not hesitate to contact me. [Sincerely,] : Sincerely, [FreeTextEntry2] : Dr Houser [FreeTextEntry3] : Sincerely, \par \par Nahum Mcknight MD, FACP\par

## 2023-06-13 NOTE — HISTORY OF PRESENT ILLNESS
[FreeTextEntry1] : 72-year-old woman with a history of hypertension, stage III CKD, myelofibrosis for over 30 years followed by Dr. Houser.  He was referred here with a creatinine of 1.4, and it was 1.4 in April, with a BUN of 26, K4.7, CO2 22, GFR 40.  She feels generally well.  She had a colonoscopy yesterday with no polyps and was told to return in 10 years.  She complains of some nocturia x6 and her PCP suggested a urology visit.  She needs a renewal for calcitriol.  Her PTH was 111.

## 2023-06-13 NOTE — ASSESSMENT
[FreeTextEntry1] : 72-year-old woman with stable CKD, good BP control -she will return in 4 months preceded by labs to include U ACR, urinalysis, BMP, Cystatin C, PTH.  She will follow-up regularly with Dr. Stroud.

## 2023-06-14 ENCOUNTER — APPOINTMENT (OUTPATIENT)
Dept: ENDOCRINOLOGY | Facility: CLINIC | Age: 73
End: 2023-06-14
Payer: MEDICARE

## 2023-06-14 VITALS
WEIGHT: 133 LBS | DIASTOLIC BLOOD PRESSURE: 65 MMHG | BODY MASS INDEX: 22.13 KG/M2 | SYSTOLIC BLOOD PRESSURE: 136 MMHG | HEART RATE: 69 BPM

## 2023-06-14 DIAGNOSIS — M81.0 AGE-RELATED OSTEOPOROSIS W/OUT CURRENT PATHOLOGICAL FRACTURE: ICD-10-CM

## 2023-06-14 PROCEDURE — 96372 THER/PROPH/DIAG INJ SC/IM: CPT

## 2023-06-14 RX ORDER — DENOSUMAB 60 MG/ML
60 INJECTION SUBCUTANEOUS
Qty: 1 | Refills: 0 | Status: COMPLETED | OUTPATIENT
Start: 2023-06-14

## 2023-06-14 RX ADMIN — DENOSUMAB 60 MG/ML: 60 INJECTION SUBCUTANEOUS at 00:00
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
## 2023-06-14 NOTE — PHYSICAL EXAM
[Alert] : alert [Well Nourished] : well nourished [No Acute Distress] : no acute distress [Normal Sclera/Conjunctiva] : normal sclera/conjunctiva [Thyroid Not Enlarged] : the thyroid was not enlarged [No Thyroid Nodules] : no palpable thyroid nodules [No Respiratory Distress] : no respiratory distress [No Accessory Muscle Use] : no accessory muscle use [Clear to Auscultation] : lungs were clear to auscultation bilaterally [Normal S1, S2] : normal S1 and S2 [Normal Rate] : heart rate was normal [Regular Rhythm] : with a regular rhythm [No Edema] : no peripheral edema [No Stigmata of Cushings Syndrome] : no stigmata of Cushings Syndrome [No Rash] : no rash [Oriented x3] : oriented to person, place, and time [Acanthosis Nigricans] : no acanthosis nigricans

## 2023-06-14 NOTE — ASSESSMENT
[FreeTextEntry1] : Ms. Swanson is a 72-year-old female with a past medical history of hypertension, CKD stage 3b (with secondary hyperparathyroidism), Polycythemia/myelofibrosis (over the past 30 years, following with Dr. Houser) who presents to clinic for evaluation of newly diagnosed osteoporosis. \par \par # Osteoporosis \par - She has CKD 3b with GFR of 40. 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 PTH was 111 (11/2022). \par - The patient reports having prior history of ankle fracture after mechanical fall in 2015. \par - Discussed different treatment options including bisphosphonates versus prolia. Patient preferred continuing with Prolia. Vitamin D25 46.1. Vitamin D1,25 42.4. Calcium 9.4 (4/26/23). \par - She will received her first dose of Denosumab today (6/14/23). No adverse reactions noted. Discussed that she needs to have follow-up injection in 6 months. Discussed importance of continuing therapy and risk of rebound bone loss if therapy is stopped. - Discussed about safety measures to prevent falls. She verbalized understanding.

## 2023-06-14 NOTE — REVIEW OF SYSTEMS
[Fatigue] : no fatigue [Decreased Appetite] : appetite not decreased [Chest Pain] : no chest pain [Palpitations] : no palpitations [Shortness Of Breath] : no shortness of breath [Nausea] : no nausea [Vomiting] : no vomiting
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
## 2023-06-29 ENCOUNTER — APPOINTMENT (OUTPATIENT)
Dept: UROLOGY | Facility: CLINIC | Age: 73
End: 2023-06-29
Payer: MEDICARE

## 2023-06-29 VITALS
SYSTOLIC BLOOD PRESSURE: 144 MMHG | HEART RATE: 75 BPM | OXYGEN SATURATION: 97 % | BODY MASS INDEX: 22.16 KG/M2 | HEIGHT: 65 IN | WEIGHT: 133 LBS | DIASTOLIC BLOOD PRESSURE: 69 MMHG | TEMPERATURE: 98 F

## 2023-06-29 DIAGNOSIS — R39.9 UNSPECIFIED SYMPTOMS AND SIGNS INVOLVING THE GENITOURINARY SYSTEM: ICD-10-CM

## 2023-06-29 DIAGNOSIS — R35.1 NOCTURIA: ICD-10-CM

## 2023-06-29 PROCEDURE — 99204 OFFICE O/P NEW MOD 45 MIN: CPT

## 2023-06-29 PROCEDURE — 51798 US URINE CAPACITY MEASURE: CPT

## 2023-06-29 NOTE — HISTORY OF PRESENT ILLNESS
[FreeTextEntry1] : 72 year old retired [employer]\par  \par no current partner\par not sexually active\par complaining of nocturia q 1 hours\par diurnil frequency q 2 hours \par occassional urgency\par no dysuria\par no hematuria

## 2023-06-30 LAB
APPEARANCE: ABNORMAL
BACTERIA: NEGATIVE /HPF
BILIRUBIN URINE: NEGATIVE
BLOOD URINE: NEGATIVE
CAST: 2 /LPF
COLOR: YELLOW
EPITHELIAL CELLS: 0 /HPF
GLUCOSE QUALITATIVE U: NEGATIVE MG/DL
KETONES URINE: NEGATIVE MG/DL
LEUKOCYTE ESTERASE URINE: ABNORMAL
MICROSCOPIC-UA: NORMAL
NITRITE URINE: NEGATIVE
PH URINE: 6.5
PROTEIN URINE: 30 MG/DL
RED BLOOD CELLS URINE: 4 /HPF
SPECIFIC GRAVITY URINE: 1.02
UROBILINOGEN URINE: 1 MG/DL
WHITE BLOOD CELLS URINE: 427 /HPF

## 2023-07-05 DIAGNOSIS — A49.9 URINARY TRACT INFECTION, SITE NOT SPECIFIED: ICD-10-CM

## 2023-07-05 DIAGNOSIS — N39.0 URINARY TRACT INFECTION, SITE NOT SPECIFIED: ICD-10-CM

## 2023-07-05 LAB — BACTERIA UR CULT: ABNORMAL

## 2023-09-05 ENCOUNTER — APPOINTMENT (OUTPATIENT)
Dept: HEART AND VASCULAR | Facility: CLINIC | Age: 73
End: 2023-09-05
Payer: MEDICARE

## 2023-09-05 ENCOUNTER — NON-APPOINTMENT (OUTPATIENT)
Age: 73
End: 2023-09-05

## 2023-09-05 VITALS — SYSTOLIC BLOOD PRESSURE: 142 MMHG | DIASTOLIC BLOOD PRESSURE: 63 MMHG

## 2023-09-05 VITALS
HEIGHT: 65 IN | HEART RATE: 77 BPM | DIASTOLIC BLOOD PRESSURE: 67 MMHG | SYSTOLIC BLOOD PRESSURE: 155 MMHG | BODY MASS INDEX: 21.49 KG/M2 | WEIGHT: 129 LBS | TEMPERATURE: 97.1 F

## 2023-09-05 PROCEDURE — 99213 OFFICE O/P EST LOW 20 MIN: CPT

## 2023-09-05 PROCEDURE — 93000 ELECTROCARDIOGRAM COMPLETE: CPT
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
## 2023-09-05 NOTE — DISCUSSION/SUMMARY
[FreeTextEntry1] : I, Dr. Jose Reynoso, personally performed the services described in the documentation, reviewed the documentation as recorded by the scribe, in my presence.  It accurately records my words and actions.\par  \par  I, CRISTINA Romero, am scribing for and in the presence of DR. REYNOSO for the following sections: history of present illness, past medical/surgical/family/social history, medication reconciliation, allergies, review of systems, vital signs, physical exam and disposition.\par

## 2023-09-05 NOTE — CARDIOLOGY SUMMARY
[de-identified] : 9/5/23 SR @ 68 bpm  12/16/22 EKG:NSR 71 bpm 1/19/23 EKG: sinus krysten ~55 bpm 3/3/23: NSR 70 bpm. Normal intervals ( ms, QRS 98 ms , QTc 409 ms)  [de-identified] : \par  1/3/23 Nuc Stress: normal imaging, normal EF, no chest pain, ST depressions, two runs of probable SVT [de-identified] : \par  1/10/2020 TTE: normal LV/RV size/fxn, severely dilated LA, mild TR, no pericardial effusion\par  1/3/23 TTE: normal LV function, EF 60-65%, grade 2 DD, prbly normal RV, mildly dilated LA, mild MR, mild-mod TR, PASP 40mmHg, mild PH, trace pericardial effusion [de-identified] : \par  1/4/23 LHC: no sig CAD

## 2023-09-13 ENCOUNTER — APPOINTMENT (OUTPATIENT)
Dept: HEART AND VASCULAR | Facility: CLINIC | Age: 73
End: 2023-09-13

## 2023-09-13 ENCOUNTER — APPOINTMENT (OUTPATIENT)
Dept: NEPHROLOGY | Facility: CLINIC | Age: 73
End: 2023-09-13
Payer: MEDICARE

## 2023-09-13 VITALS — WEIGHT: 129 LBS | BODY MASS INDEX: 25.32 KG/M2 | HEIGHT: 60 IN

## 2023-09-13 LAB
ANION GAP SERPL CALC-SCNC: 11 MMOL/L
APPEARANCE: CLEAR
BACTERIA: NEGATIVE /HPF
BILIRUBIN URINE: NEGATIVE
BLOOD URINE: NEGATIVE
BUN SERPL-MCNC: 19 MG/DL
CALCIUM SERPL-MCNC: 9 MG/DL
CALCIUM SERPL-MCNC: 9 MG/DL
CAST: 0 /LPF
CHLORIDE SERPL-SCNC: 108 MMOL/L
CO2 SERPL-SCNC: 23 MMOL/L
COLOR: NORMAL
CREAT SERPL-MCNC: 1.34 MG/DL
CREAT SPEC-SCNC: 112 MG/DL
CYSTATIN C SERPL-MCNC: 1.79 MG/L
EGFR: 42 ML/MIN/1.73M2
EPITHELIAL CELLS: 0 /HPF
GFR/BSA.PRED SERPLBLD CYS-BASED-ARV: 32 ML/MIN/1.73M2
GLUCOSE QUALITATIVE U: NEGATIVE
GLUCOSE SERPL-MCNC: 91 MG/DL
KETONES URINE: NEGATIVE
LEUKOCYTE ESTERASE URINE: NEGATIVE
MICROALBUMIN 24H UR DL<=1MG/L-MCNC: 35.2 MG/DL
MICROALBUMIN/CREAT 24H UR-RTO: 313 MG/G
MICROSCOPIC-UA: NORMAL
NITRITE URINE: NEGATIVE
PARATHYROID HORMONE INTACT: 177 PG/ML
PH URINE: 6
POTASSIUM SERPL-SCNC: 4.3 MMOL/L
PROTEIN URINE: ABNORMAL
RED BLOOD CELLS URINE: 1 /HPF
REVIEW: NORMAL
SODIUM SERPL-SCNC: 142 MMOL/L
SPECIFIC GRAVITY URINE: 1.02
UROBILINOGEN URINE: NORMAL
WHITE BLOOD CELLS URINE: 0 /HPF

## 2023-09-13 PROCEDURE — 99443: CPT | Mod: 95

## 2023-09-14 ENCOUNTER — APPOINTMENT (OUTPATIENT)
Dept: HEART AND VASCULAR | Facility: CLINIC | Age: 73
End: 2023-09-14
Payer: MEDICARE

## 2023-09-14 VITALS — SYSTOLIC BLOOD PRESSURE: 130 MMHG | DIASTOLIC BLOOD PRESSURE: 62 MMHG

## 2023-09-14 VITALS
OXYGEN SATURATION: 99 % | HEIGHT: 65 IN | SYSTOLIC BLOOD PRESSURE: 146 MMHG | TEMPERATURE: 97.8 F | WEIGHT: 135 LBS | BODY MASS INDEX: 22.49 KG/M2 | HEART RATE: 67 BPM | DIASTOLIC BLOOD PRESSURE: 74 MMHG

## 2023-09-14 DIAGNOSIS — I48.0 PAROXYSMAL ATRIAL FIBRILLATION: ICD-10-CM

## 2023-09-14 PROCEDURE — 99214 OFFICE O/P EST MOD 30 MIN: CPT

## 2023-09-20 ENCOUNTER — RX RENEWAL (OUTPATIENT)
Age: 73
End: 2023-09-20

## 2023-12-14 LAB
ANION GAP SERPL CALC-SCNC: 11 MMOL/L
BUN SERPL-MCNC: 19 MG/DL
CALCIUM SERPL-MCNC: 9.2 MG/DL
CALCIUM SERPL-MCNC: 9.2 MG/DL
CHLORIDE SERPL-SCNC: 108 MMOL/L
CO2 SERPL-SCNC: 25 MMOL/L
CREAT SERPL-MCNC: 1.41 MG/DL
CREAT SPEC-SCNC: 284 MG/DL
EGFR: 39 ML/MIN/1.73M2
GLUCOSE SERPL-MCNC: 86 MG/DL
MICROALBUMIN 24H UR DL<=1MG/L-MCNC: 33.8 MG/DL
MICROALBUMIN/CREAT 24H UR-RTO: 119 MG/G
PARATHYROID HORMONE INTACT: 135 PG/ML
POTASSIUM SERPL-SCNC: 4.9 MMOL/L
SODIUM SERPL-SCNC: 144 MMOL/L

## 2023-12-19 ENCOUNTER — APPOINTMENT (OUTPATIENT)
Dept: NEPHROLOGY | Facility: CLINIC | Age: 73
End: 2023-12-19
Payer: MEDICARE

## 2023-12-19 VITALS
SYSTOLIC BLOOD PRESSURE: 136 MMHG | WEIGHT: 135 LBS | DIASTOLIC BLOOD PRESSURE: 71 MMHG | HEIGHT: 65 IN | BODY MASS INDEX: 22.49 KG/M2

## 2023-12-19 DIAGNOSIS — N25.81 SECONDARY HYPERPARATHYROIDISM OF RENAL ORIGIN: ICD-10-CM

## 2023-12-19 PROCEDURE — 99214 OFFICE O/P EST MOD 30 MIN: CPT

## 2023-12-19 NOTE — PHYSICAL EXAM
[General Appearance - Alert] : alert [General Appearance - In No Acute Distress] : in no acute distress [Sclera] : the sclera and conjunctiva were normal [PERRL With Normal Accommodation] : pupils were equal in size, round, and reactive to light [Extraocular Movements] : extraocular movements were intact [FreeTextEntry1] : left eye abnormal [Outer Ear] : the ears and nose were normal in appearance [Oropharynx] : the oropharynx was normal [Neck Appearance] : the appearance of the neck was normal [Neck Cervical Mass (___cm)] : no neck mass was observed [Jugular Venous Distention Increased] : there was no jugular-venous distention [Auscultation Breath Sounds / Voice Sounds] : lungs were clear to auscultation bilaterally [Heart Rate And Rhythm] : heart rate was normal and rhythm regular [Heart Sounds] : normal S1 and S2 [Heart Sounds Gallop] : no gallops [Murmurs] : no murmurs [Heart Sounds Pericardial Friction Rub] : no pericardial rub [Skin Color & Pigmentation] : normal skin color and pigmentation [Skin Turgor] : normal skin turgor [] : no rash [Deep Tendon Reflexes (DTR)] : deep tendon reflexes were 2+ and symmetric [Sensation] : the sensory exam was normal to light touch and pinprick [No Focal Deficits] : no focal deficits

## 2023-12-19 NOTE — HISTORY OF PRESENT ILLNESS
[FreeTextEntry1] : 73-year-old woman with a history of hypertension, stage IIIb CKD, anemia of CKD, secondary hyperparathyroidism, metabolic acidosis.  She previously used NSAIDs, but with discontinuation, her creatinine improved from 1.6 down to its current level of 1.4, with a BUN of 19, GFR 39, K4.9, CO2 22.  Her PTH has dropped from 177 down to 135 on calcitriol.  Her urine microalbumin has improved from 313 down to 119.  Her BP was 130/62 when she saw cardiology on September 14.  Metoprolol was continued for rate control and A-fib.  Her LDL was 54 on atorvastatin.  Her BP remains good on amlodipine 10 mg daily.  She is on sodium bicarb 650 mg, 2 twice daily to minimize metabolic acidosis.  Her appetite is fair.  She has no edema on amlodipine, and no pruritus.  She takes her meds faithfully.

## 2023-12-19 NOTE — ASSESSMENT
[FreeTextEntry1] : 73-year-old woman with stable stage IIIb CKD, improved secondary hyperparathyroidism, improved microalbuminuria, stable controlled metabolic acidosis, normokalemia.  I have ordered labs to be repeated in 4 to 5 months, to include BMP, Cystatin C, PTH, H&H.

## 2023-12-28 ENCOUNTER — APPOINTMENT (OUTPATIENT)
Dept: INTERNAL MEDICINE | Facility: CLINIC | Age: 73
End: 2023-12-28
Payer: MEDICARE

## 2023-12-28 VITALS
SYSTOLIC BLOOD PRESSURE: 149 MMHG | HEART RATE: 75 BPM | DIASTOLIC BLOOD PRESSURE: 76 MMHG | WEIGHT: 135 LBS | TEMPERATURE: 98 F | BODY MASS INDEX: 22.49 KG/M2 | HEIGHT: 65 IN | OXYGEN SATURATION: 98 %

## 2023-12-28 VITALS
BODY MASS INDEX: 22.49 KG/M2 | HEIGHT: 65 IN | TEMPERATURE: 98 F | HEART RATE: 69 BPM | DIASTOLIC BLOOD PRESSURE: 78 MMHG | WEIGHT: 135 LBS | OXYGEN SATURATION: 98 % | SYSTOLIC BLOOD PRESSURE: 143 MMHG

## 2023-12-28 PROCEDURE — 99214 OFFICE O/P EST MOD 30 MIN: CPT

## 2023-12-28 NOTE — ASSESSMENT
[FreeTextEntry1] : 73-year-old woman with a history of hypertension, stage IIIb CKD, anemia of CKD, secondary hyperparathyroidism, metabolic acidosis, Anemia 2/2 myelofibrosis on ruxolitinib, and atrial fibrillation on eliquis presenting today for yearly physical   #Hemorrhoid patient w/ colonoscopy earlier this year and no concerning features found per patient - colorectal evaluation if bleeding persists or become painful - advise patient use wet wipes after bowel movement    #Polyuria  Pt with 1 month hx of polyuria, associated with q3 hrs frequency, nocturia as well as rare episodes of incontinence. UA currently negative but previously positive at oncologist office for which she was treated with 5 day course of Macrobid. Pt currently denies dysuria or hematuria. Pt denies polydipsia.  - RESOLVED   #Paroxysmal afib  Pt with hx of Afib with RVR while hospitalized at [facility]. Pt d/arvin with Lopressor 25mg BID and eliquis 5mg BID. Pt follows out pt cardiologist with Dr. Siddiqui. Currently rate controlled. [id number] : 3  -c/w lopressor 25BID and eliquis 5mg BID    #Osteoporosis  Pt with newly diagnosed osteoporosis follows outpt endocrinologist. ON Prolia.  - f/u with Endocrinologist for next injection q6months     # HTN  Pt w/ hx of HTN on Amlodipine 10mg qd  - c/w amlodipine 10mg     # CKD 3  Pt with CKD3, kidney function stable from previous visit. Pt to follow up with nephrologist Dr. Mcknight. Additionally pt with secondary hyperparathyroidism due to CKD. on Calcitrol and sodium bicarb 650 BID with meals.  - c/w sodium bicarb 650 BID w/ meals  - c/w calcitrol for vit D def  - outpt f/u with nephrology Dr. Mcknight    # HCM  - repeat mammo in January - return in 6 weeks for close f/u of BP diary. - obtain Shingles vax - UTD on FLu - UTD on Covid - UTD on Colonoscopy - f/u Pneumovax history - aged out of Pap

## 2023-12-28 NOTE — PHYSICAL EXAM
[No Acute Distress] : no acute distress [Well Nourished] : well nourished [Well Developed] : well developed [Well-Appearing] : well-appearing [Normal Sclera/Conjunctiva] : normal sclera/conjunctiva [PERRL] : pupils equal round and reactive to light [EOMI] : extraocular movements intact [Normal Outer Ear/Nose] : the outer ears and nose were normal in appearance [Normal Oropharynx] : the oropharynx was normal [No JVD] : no jugular venous distention [No Lymphadenopathy] : no lymphadenopathy [Supple] : supple [Thyroid Normal, No Nodules] : the thyroid was normal and there were no nodules present [No Respiratory Distress] : no respiratory distress  [No Accessory Muscle Use] : no accessory muscle use [Clear to Auscultation] : lungs were clear to auscultation bilaterally [Normal Rate] : normal rate  [Regular Rhythm] : with a regular rhythm [Normal S1, S2] : normal S1 and S2 [No Carotid Bruits] : no carotid bruits [No Abdominal Bruit] : a ~M bruit was not heard ~T in the abdomen [No Varicosities] : no varicosities [Pedal Pulses Present] : the pedal pulses are present [No Edema] : there was no peripheral edema [No Palpable Aorta] : no palpable aorta [No Extremity Clubbing/Cyanosis] : no extremity clubbing/cyanosis [Soft] : abdomen soft [Non Tender] : non-tender [Non-distended] : non-distended [No Masses] : no abdominal mass palpated [No HSM] : no HSM [Normal Bowel Sounds] : normal bowel sounds [Normal Posterior Cervical Nodes] : no posterior cervical lymphadenopathy [Normal Anterior Cervical Nodes] : no anterior cervical lymphadenopathy [No CVA Tenderness] : no CVA  tenderness [No Spinal Tenderness] : no spinal tenderness [No Joint Swelling] : no joint swelling [Grossly Normal Strength/Tone] : grossly normal strength/tone [No Rash] : no rash [Coordination Grossly Intact] : coordination grossly intact [No Focal Deficits] : no focal deficits [Normal Gait] : normal gait [Deep Tendon Reflexes (DTR)] : deep tendon reflexes were 2+ and symmetric [Normal Affect] : the affect was normal [Normal Insight/Judgement] : insight and judgment were intact [de-identified] : systolic murmur at RUSB Unna Boot Text: An Unna boot was placed to help immobilize the limb and facilitate more rapid healing.

## 2023-12-28 NOTE — HISTORY OF PRESENT ILLNESS
[FreeTextEntry1] : yearly physical  [de-identified] : 73-year-old woman with a history of hypertension, stage IIIb CKD, anemia of CKD, secondary hyperparathyroidism, metabolic acidosis, Anemia 2/2 myelofibrosis on ruxolitinib, and atrial fibrillation on eliquis presenting today for yearly physical. No acute complaints at this time. ROS is negative. She did, however, have Covid this past December, but she is feeling well at this time. She has had both vaccines and a recent booster in Oct '23. Interval events: seen by urology for urinary frequency and was asked to start a voiding diary, she states that it is no longer a problem and it has stopped on its own; she is following with cardiology for her newly diagnosed Afib, she has a visit on 12/29; she is also following with nephrology for her CKD hx where she was just asked to stop taking NSAIDs (she does not recall this) and recent increase in her calcitriol 2/2 rising PTH levels, she is also following with endocrinology for osteoporosis for which she was prescribed Denosumab, she missed her last injection but her next one will be in February to make up the injection.

## 2023-12-28 NOTE — END OF VISIT
[FreeTextEntry3] : 72 yo female with hx HTN, CKD, anemia, CKD, myelofibrosis, hemorroids, here for f/u  1) afib - following with cards, on metoprolol and eliquis, reviewed cards note 2) CKD - stable, reviewed nephro note 3) HTN - above goal today, counseled to monitor at home 4) hemorroids - recommended sitz baths, avoid constipation, will refer to colorectal if no improvement

## 2023-12-29 ENCOUNTER — LABORATORY RESULT (OUTPATIENT)
Age: 73
End: 2023-12-29

## 2023-12-29 ENCOUNTER — APPOINTMENT (OUTPATIENT)
Dept: HEART AND VASCULAR | Facility: CLINIC | Age: 73
End: 2023-12-29
Payer: MEDICARE

## 2023-12-29 ENCOUNTER — NON-APPOINTMENT (OUTPATIENT)
Age: 73
End: 2023-12-29

## 2023-12-29 VITALS — DIASTOLIC BLOOD PRESSURE: 70 MMHG | SYSTOLIC BLOOD PRESSURE: 136 MMHG

## 2023-12-29 VITALS
BODY MASS INDEX: 21.66 KG/M2 | HEART RATE: 79 BPM | DIASTOLIC BLOOD PRESSURE: 74 MMHG | HEIGHT: 65 IN | OXYGEN SATURATION: 99 % | WEIGHT: 129.98 LBS | SYSTOLIC BLOOD PRESSURE: 150 MMHG | TEMPERATURE: 98.2 F

## 2023-12-29 DIAGNOSIS — D75.81 MYELOFIBROSIS: ICD-10-CM

## 2023-12-29 LAB
ALBUMIN SERPL ELPH-MCNC: 4.8 G/DL
ALP BLD-CCNC: 76 U/L
ALT SERPL-CCNC: 21 U/L
ANION GAP SERPL CALC-SCNC: 12 MMOL/L
AST SERPL-CCNC: 20 U/L
BILIRUB SERPL-MCNC: 0.6 MG/DL
BUN SERPL-MCNC: 20 MG/DL
CALCIUM SERPL-MCNC: 9.4 MG/DL
CHLORIDE SERPL-SCNC: 102 MMOL/L
CHOLEST SERPL-MCNC: 111 MG/DL
CO2 SERPL-SCNC: 25 MMOL/L
CREAT SERPL-MCNC: 1.22 MG/DL
EGFR: 47 ML/MIN/1.73M2
ESTIMATED AVERAGE GLUCOSE: 91 MG/DL
GLUCOSE SERPL-MCNC: 83 MG/DL
HBA1C MFR BLD HPLC: 4.8 %
HDLC SERPL-MCNC: 29 MG/DL
LDLC SERPL CALC-MCNC: 61 MG/DL
NONHDLC SERPL-MCNC: 81 MG/DL
POTASSIUM SERPL-SCNC: 4.9 MMOL/L
PROT SERPL-MCNC: 7.1 G/DL
SODIUM SERPL-SCNC: 140 MMOL/L
TRIGL SERPL-MCNC: 110 MG/DL
TSH SERPL-ACNC: 3.71 UIU/ML

## 2023-12-29 PROCEDURE — 93000 ELECTROCARDIOGRAM COMPLETE: CPT

## 2023-12-29 PROCEDURE — 99214 OFFICE O/P EST MOD 30 MIN: CPT

## 2023-12-29 NOTE — HISTORY OF PRESENT ILLNESS
[FreeTextEntry1] :   Kusum Swanson is a 74yo F w/ PMHx of HTN, HLD, CKD, Anemia 2/2 myelofibrosis on ruxolitinib, normal cors on C in 2023, and atrial fibrillation on apixaban who presents for follow-up. Pt reports feeling well since her previous visit in March w/ Dr. Jimenez. Patient denies any chest pain, SOB, palpitations, orthopnea, PND or LE edema. Does c/o chronic PAGAN. Compliant w/ her meds and tolerating them well.   23 ECG: atrial flutter w/ VB at 66 bpm  EC22 EKG:NSR 71 bpm 23 EKG: sinus krysten ~55 bpm   Stress Test: 1/3/23 Nuc Stress: normal imaging, normal EF, no chest pain, ST depressions, two runs of probable SVT   Echo: 1/10/2020 TTE: normal LV/RV size/fxn, severely dilated LA, mild TR, no pericardial effusion 1/3/23 TTE: normal LV function, EF 60-65%, grade 2 DD, prbly normal RV, mildly dilated LA, mild MR, mild-mod TR, PASP 40mmHg, mild PH, trace pericardial effusion   Cardiac Cath/PCI: 23 LHC: no sig CAD

## 2023-12-29 NOTE — PHYSICAL EXAM
[Well Developed] : well developed [Well Nourished] : well nourished [No Acute Distress] : no acute distress [Normal Venous Pressure] : normal venous pressure [No Carotid Bruit] : no carotid bruit [Normal S1, S2] : normal S1, S2 [No Rub] : no rub [No Gallop] : no gallop [Clear Lung Fields] : clear lung fields [Good Air Entry] : good air entry [No Respiratory Distress] : no respiratory distress  [Soft] : abdomen soft [Non Tender] : non-tender [No Masses/organomegaly] : no masses/organomegaly [Normal Bowel Sounds] : normal bowel sounds [Normal Gait] : normal gait [No Edema] : no edema [No Cyanosis] : no cyanosis [No Clubbing] : no clubbing [No Varicosities] : no varicosities [No Rash] : no rash [No Skin Lesions] : no skin lesions [Moves all extremities] : moves all extremities [No Focal Deficits] : no focal deficits [Normal Speech] : normal speech [Alert and Oriented] : alert and oriented [Normal memory] : normal memory [de-identified] : L eye prosthesis [de-identified] : 3/6 HSM LSB 2-4thICS radiating to axialla

## 2023-12-29 NOTE — ASSESSMENT
[FreeTextEntry1] : #Atrial fibrillation/flutter: -on Eliquis -stable at f/u w/ Dr. Siddiqui 9/5/23; NSR on EKG - now in atrial flutter w/ VB - repeat TTE -continue metoprolol - no c/o abnl bleeding  - further recommendations pending results   # cardiac murmur - c/w MR - euvolemic on exam, VSS - check TTE   #HTN: -BP at goal on amlodipine alone; no further losartan/hctz at this time - pt will continue to monitor at home and alert us if any fluctuations  #HL: -LDL on atorvastatin, normal cors on 1/2023 [facility], LDL 54 in Jan 2023 - repeat labs today  - cont atorva 10 qd  - further recommendations pending results   RTC 3 weeks

## 2023-12-30 LAB
BASOPHILS # BLD AUTO: 0.13 K/UL
BASOPHILS NFR BLD AUTO: 2.6 %
EOSINOPHIL # BLD AUTO: 0.04 K/UL
EOSINOPHIL NFR BLD AUTO: 0.9 %
HCT VFR BLD CALC: 24.9 %
HGB BLD-MCNC: 7.3 G/DL
LYMPHOCYTES # BLD AUTO: 1.49 K/UL
LYMPHOCYTES NFR BLD AUTO: 30.1 %
MAN DIFF?: NORMAL
MCHC RBC-ENTMCNC: 29.3 GM/DL
MCHC RBC-ENTMCNC: 30.2 PG
MCV RBC AUTO: 102.9 FL
MONOCYTES # BLD AUTO: 0.44 K/UL
MONOCYTES NFR BLD AUTO: 8.8 %
NEUTROPHILS # BLD AUTO: 2.15 K/UL
NEUTROPHILS NFR BLD AUTO: 43.4 %
PLATELET # BLD AUTO: 268 K/UL
RBC # BLD: 2.42 M/UL
RBC # FLD: 19.2 %
WBC # FLD AUTO: 4.95 K/UL

## 2024-01-02 LAB — APO LP(A) SERPL-MCNC: 159.3 NMOL/L

## 2024-01-05 LAB
24R-OH-CALCIDIOL SERPL-MCNC: 72.2 PG/ML
25(OH)D3 SERPL-MCNC: 28.2 NG/ML
ANION GAP SERPL CALC-SCNC: 13 MMOL/L
BUN SERPL-MCNC: 19 MG/DL
CALCIUM SERPL-MCNC: 9.1 MG/DL
CHLORIDE SERPL-SCNC: 108 MMOL/L
CO2 SERPL-SCNC: 22 MMOL/L
CREAT SERPL-MCNC: 1.4 MG/DL
EGFR: 39 ML/MIN/1.73M2
GLUCOSE SERPL-MCNC: 85 MG/DL
POTASSIUM SERPL-SCNC: 4.9 MMOL/L
SODIUM SERPL-SCNC: 143 MMOL/L

## 2024-01-08 ENCOUNTER — APPOINTMENT (OUTPATIENT)
Dept: ENDOCRINOLOGY | Facility: CLINIC | Age: 74
End: 2024-01-08
Payer: MEDICARE

## 2024-01-08 PROCEDURE — 96372 THER/PROPH/DIAG INJ SC/IM: CPT

## 2024-01-18 ENCOUNTER — RX RENEWAL (OUTPATIENT)
Age: 74
End: 2024-01-18

## 2024-01-19 ENCOUNTER — APPOINTMENT (OUTPATIENT)
Dept: HEART AND VASCULAR | Facility: CLINIC | Age: 74
End: 2024-01-19
Payer: MEDICARE

## 2024-01-19 ENCOUNTER — APPOINTMENT (OUTPATIENT)
Dept: HEART AND VASCULAR | Facility: CLINIC | Age: 74
End: 2024-01-19

## 2024-01-19 VITALS
DIASTOLIC BLOOD PRESSURE: 62 MMHG | TEMPERATURE: 98.1 F | OXYGEN SATURATION: 99 % | HEART RATE: 69 BPM | HEIGHT: 65 IN | BODY MASS INDEX: 21.83 KG/M2 | SYSTOLIC BLOOD PRESSURE: 140 MMHG | WEIGHT: 131 LBS

## 2024-01-19 PROCEDURE — 93306 TTE W/DOPPLER COMPLETE: CPT

## 2024-01-23 ENCOUNTER — RX RENEWAL (OUTPATIENT)
Age: 74
End: 2024-01-23

## 2024-01-24 ENCOUNTER — NON-APPOINTMENT (OUTPATIENT)
Age: 74
End: 2024-01-24

## 2024-01-24 NOTE — HISTORY OF PRESENT ILLNESS
[FreeTextEntry1] : 72 y/o F presents for initial evaluation  referred by: Dr. Luzmaria Steen Reason for visit: Hemorrhoid  PMH: HTN. A Fib, CKD3 , Meylofibrosis, Vitamin D insufficiency PSH: Hernia repair FH: Denies CRC/IBD Meds: Eliquis, Metoprolol, Amlodipine atorvastatin, Prolia, Ruxolitinib Allergies:   F/u with Cardiology Dr. Mendoza for hx AFib/Aflutter last seen 12/29/2023, recommended to repeat TTE, continue Eliquis, amlodipine and atorvastatin. Next f/u 02/08/2024   Last Colonoscopy:

## 2024-01-25 ENCOUNTER — NON-APPOINTMENT (OUTPATIENT)
Age: 74
End: 2024-01-25

## 2024-01-25 ENCOUNTER — APPOINTMENT (OUTPATIENT)
Dept: COLORECTAL SURGERY | Facility: CLINIC | Age: 74
End: 2024-01-25
Payer: MEDICARE

## 2024-01-25 VITALS
BODY MASS INDEX: 21.83 KG/M2 | HEART RATE: 71 BPM | WEIGHT: 131 LBS | SYSTOLIC BLOOD PRESSURE: 149 MMHG | DIASTOLIC BLOOD PRESSURE: 61 MMHG | TEMPERATURE: 97.7 F | HEIGHT: 65 IN

## 2024-01-25 DIAGNOSIS — K64.9 UNSPECIFIED HEMORRHOIDS: ICD-10-CM

## 2024-01-25 PROCEDURE — 99203 OFFICE O/P NEW LOW 30 MIN: CPT | Mod: 25

## 2024-01-25 PROCEDURE — 46500 INJECTION INTO HEMORRHOID(S): CPT

## 2024-01-25 RX ORDER — HYDROCORTISONE 25 MG/G
2.5 CREAM TOPICAL
Qty: 1 | Refills: 3 | Status: ACTIVE | COMMUNITY
Start: 2024-01-25 | End: 1900-01-01
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
## 2024-02-09 ENCOUNTER — APPOINTMENT (OUTPATIENT)
Dept: HEART AND VASCULAR | Facility: CLINIC | Age: 74
End: 2024-02-09
Payer: MEDICARE

## 2024-02-09 VITALS
BODY MASS INDEX: 22.49 KG/M2 | TEMPERATURE: 97.5 F | SYSTOLIC BLOOD PRESSURE: 140 MMHG | OXYGEN SATURATION: 98 % | HEART RATE: 62 BPM | HEIGHT: 65 IN | WEIGHT: 135 LBS | DIASTOLIC BLOOD PRESSURE: 67 MMHG

## 2024-02-09 DIAGNOSIS — I34.0 NONRHEUMATIC MITRAL (VALVE) INSUFFICIENCY: ICD-10-CM

## 2024-02-09 DIAGNOSIS — R06.09 OTHER FORMS OF DYSPNEA: ICD-10-CM

## 2024-02-09 DIAGNOSIS — I07.1 RHEUMATIC TRICUSPID INSUFFICIENCY: ICD-10-CM

## 2024-02-09 PROCEDURE — 99214 OFFICE O/P EST MOD 30 MIN: CPT

## 2024-02-09 PROCEDURE — G2211 COMPLEX E/M VISIT ADD ON: CPT

## 2024-02-09 RX ORDER — HYDROCHLOROTHIAZIDE 25 MG/1
25 TABLET ORAL
Qty: 90 | Refills: 2 | Status: ACTIVE | COMMUNITY
Start: 2024-02-09 | End: 1900-01-01

## 2024-02-15 ENCOUNTER — APPOINTMENT (OUTPATIENT)
Dept: INTERNAL MEDICINE | Facility: CLINIC | Age: 74
End: 2024-02-15
Payer: MEDICARE

## 2024-02-15 VITALS
RESPIRATION RATE: 100 BRPM | WEIGHT: 137 LBS | BODY MASS INDEX: 22.82 KG/M2 | SYSTOLIC BLOOD PRESSURE: 118 MMHG | HEIGHT: 65 IN | DIASTOLIC BLOOD PRESSURE: 65 MMHG | TEMPERATURE: 97.9 F

## 2024-02-15 DIAGNOSIS — Z00.00 ENCOUNTER FOR GENERAL ADULT MEDICAL EXAMINATION W/OUT ABNORMAL FINDINGS: ICD-10-CM

## 2024-02-15 PROCEDURE — 99214 OFFICE O/P EST MOD 30 MIN: CPT

## 2024-02-15 RX ORDER — AMLODIPINE BESYLATE 10 MG/1
10 TABLET ORAL
Qty: 90 | Refills: 0 | Status: ACTIVE | COMMUNITY
Start: 2023-06-06 | End: 1900-01-01

## 2024-02-15 RX ORDER — MULTIVIT-MIN/FOLIC/VIT K/LYCOP 400-300MCG
1000 TABLET ORAL DAILY
Qty: 30 | Refills: 3 | Status: ACTIVE | COMMUNITY
Start: 2020-07-13

## 2024-02-15 RX ORDER — APIXABAN 5 MG/1
5 TABLET, FILM COATED ORAL
Qty: 60 | Refills: 6 | Status: ACTIVE | COMMUNITY
Start: 1900-01-01 | End: 1900-01-01

## 2024-02-15 RX ORDER — METOPROLOL TARTRATE 25 MG/1
25 TABLET, FILM COATED ORAL
Qty: 180 | Refills: 1 | Status: ACTIVE | COMMUNITY
Start: 1900-01-01 | End: 1900-01-01

## 2024-02-15 RX ORDER — ATORVASTATIN CALCIUM 10 MG/1
10 TABLET, FILM COATED ORAL
Qty: 90 | Refills: 3 | Status: ACTIVE | COMMUNITY
Start: 1900-01-01 | End: 1900-01-01

## 2024-02-15 RX ORDER — SODIUM BICARBONATE 650 MG/1
650 TABLET ORAL TWICE DAILY
Qty: 360 | Refills: 1 | Status: ACTIVE | COMMUNITY
Start: 2021-01-07 | End: 1900-01-01

## 2024-02-15 RX ORDER — CALCITRIOL 0.5 UG/1
0.5 CAPSULE, LIQUID FILLED ORAL
Qty: 90 | Refills: 0 | Status: ACTIVE | COMMUNITY
Start: 2023-09-13 | End: 1900-01-01

## 2024-02-15 NOTE — REVIEW OF SYSTEMS
[Initial Evaluation] : an initial evaluation of [Allergy Evaluation/ Skin Testing] : allergy evaluation and or skin testing [Congestion] : congestion [Dyspnea on Exertion] : dyspnea on exertion [Fever] : no fever [Runny Nose] : runny nose [Chills] : no chills [Mother] : mother [Discharge] : no discharge [Family Member] : family member [Pain] : no pain [Earache] : no earache [Chest Pain] : no chest pain [Palpitations] : no palpitations [Lower Ext Edema] : no lower extremity edema [Orthopnea] : no orthopnea [Paroxysmal Nocturnal Dyspnea] : no paroxysmal nocturnal dyspnea [Shortness Of Breath] : no shortness of breath [Wheezing] : no wheezing [Cough] : no cough [Abdominal Pain] : no abdominal pain [Constipation] : no constipation [Dysuria] : no dysuria [Joint Pain] : no joint pain [Skin Rash] : no skin rash [Headache] : no headache [Depression] : no depression [Easy Bleeding] : no easy bleeding

## 2024-02-15 NOTE — HEALTH RISK ASSESSMENT
[0] : 2) Feeling down, depressed, or hopeless: Not at all (0) [PHQ-2 Negative - No further assessment needed] : PHQ-2 Negative - No further assessment needed [[id number]] : 0

## 2024-02-15 NOTE — HISTORY OF PRESENT ILLNESS
[FreeTextEntry1] : follow up visit [de-identified] : Pt is a 73-year-old woman with a history of hypertension, stage IIIb CKD, anemia of CKD, secondary hyperparathyroidism, metabolic acidosis, Anemia 2/2 myelofibrosis on ruxolitinib, and atrial fibrillation on eliquis presenting today for f/u and medication refils. Pt recently seen by cardiology who was concerned for murmur causing, pt started on HCTZ. Last Echo in January showing normal LV sys fxn, EF 64%, grade 3 menjivar dysfxn, trace AR, mild MR, mild IL, mod TR, PASP 68 mmHg, small pericardial effusion. Repeat Echo ordered for 3/22/24.

## 2024-02-15 NOTE — ASSESSMENT
[FreeTextEntry1] : Pt is a 73-year-old woman with a history of hypertension, stage IIIb CKD, anemia of CKD, secondary hyperparathyroidism, metabolic acidosis, Anemia 2/2 myelofibrosis on ruxolitinib, and atrial fibrillation on eliquis presenting today for f/u and medication refils  #HTN  Refilled  - Lopressor 25mg qd,  - Amlodipine 10mg qd - HCTZ 25mg qd (no refilled as recently started and filled by cardiologist)   #Afib Refilled  - Eliquis 5mg BID  #HLD refilled  - Atorvastatin 10mg qhs  #CKD refilled  - Calcitriol 0.5mcg qd  - Na Bicarb 650mg TID - Vit C 1g qd   #Myelofibrosis  Pt sees HemeOnc who Rx Jakafi  #Endoi  Pt sees endo who Rx Denosumab q 6 months (Last inj Dec 2023)

## 2024-02-15 NOTE — PHYSICAL EXAM
[No Acute Distress] : no acute distress [Normal Sclera/Conjunctiva] : normal sclera/conjunctiva [PERRL] : pupils equal round and reactive to light [EOMI] : extraocular movements intact [Normal Outer Ear/Nose] : the outer ears and nose were normal in appearance [No JVD] : no jugular venous distention [No Lymphadenopathy] : no lymphadenopathy [Supple] : supple [No Respiratory Distress] : no respiratory distress  [No Accessory Muscle Use] : no accessory muscle use [Clear to Auscultation] : lungs were clear to auscultation bilaterally [Normal Rate] : normal rate  [Regular Rhythm] : with a regular rhythm [No Edema] : there was no peripheral edema [Soft] : abdomen soft [Non Tender] : non-tender [Normal Posterior Cervical Nodes] : no posterior cervical lymphadenopathy [Normal Anterior Cervical Nodes] : no anterior cervical lymphadenopathy [No CVA Tenderness] : no CVA  tenderness [No Spinal Tenderness] : no spinal tenderness [No Joint Swelling] : no joint swelling [No Rash] : no rash [Normal Gait] : normal gait [Normal Affect] : the affect was normal [Normal Insight/Judgement] : insight and judgment were intact [de-identified] : Right eye prosthetic  [de-identified] :  2/6 RIANNA CASTELLANOS

## 2024-02-28 ENCOUNTER — APPOINTMENT (OUTPATIENT)
Dept: ENDOCRINOLOGY | Facility: CLINIC | Age: 74
End: 2024-02-28
Payer: MEDICARE

## 2024-02-28 VITALS
BODY MASS INDEX: 22.3 KG/M2 | DIASTOLIC BLOOD PRESSURE: 61 MMHG | HEART RATE: 70 BPM | WEIGHT: 134 LBS | SYSTOLIC BLOOD PRESSURE: 122 MMHG

## 2024-02-28 PROCEDURE — 99213 OFFICE O/P EST LOW 20 MIN: CPT

## 2024-02-28 RX ORDER — HYDROCHLOROTHIAZIDE 25 MG/1
25 TABLET ORAL
Refills: 0 | Status: ACTIVE | COMMUNITY

## 2024-02-28 NOTE — REVIEW OF SYSTEMS
[Fatigue] : no fatigue [Decreased Appetite] : appetite not decreased [Recent Weight Gain (___ Lbs)] : no recent weight gain [Recent Weight Loss (___ Lbs)] : no recent weight loss [Chest Pain] : no chest pain [Palpitations] : no palpitations [Shortness Of Breath] : no shortness of breath [Nausea] : no nausea [Vomiting] : no vomiting

## 2024-02-28 NOTE — ASSESSMENT
[FreeTextEntry1] : Ms. Swanson is a 73-year-old female with a past medical history of hypertension, CKD stage 3b (with secondary hyperparathyroidism), polycythemia/myelofibrosis (over the past 30 years, following with Dr. Houser) who presents to clinic for follow-up of recently diagnosed osteoporosis.   # Osteoporosis  The patient had a DXA scan in 2018 which was within normal limits; however, a repeat DXA scan (2/13/23) showed a T-score of -2.8 at the left radius segment one third of the way from the wrist to the elbow (the left radius was not evaluated previously). The left hip T-score -1.1 (change of -14.3% compared to previous study). Femoral neck T-score -1.6 (change of -13.6% compared to previous study). Spine T-score 0.3 (change of -2.8% compared to previous study). - She has CKD 3b. 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 PTH was 135 (12/2023).  - Continue with denosumab injections, next due in July 8th, 2024.  - Continue with vitamin D supplementation - Discussed importance of continuing therapy and risk of rebound bone loss if therapy is stopped.  - Discussed about safety measures to prevent falls. She verbalized understanding.  - Repeat DXA scan now.  - Check vitamin D and calcium levels prior to her next injection.  RTC in 7/8/2024 Case discussed with Dr. Alas.
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
## 2024-02-28 NOTE — PHYSICAL EXAM
[Well Nourished] : well nourished [Alert] : alert [No Acute Distress] : no acute distress [Normal Sclera/Conjunctiva] : normal sclera/conjunctiva [Thyroid Not Enlarged] : the thyroid was not enlarged [No Thyroid Nodules] : no palpable thyroid nodules [No Respiratory Distress] : no respiratory distress [No Accessory Muscle Use] : no accessory muscle use [Clear to Auscultation] : lungs were clear to auscultation bilaterally [Normal S1, S2] : normal S1 and S2 [Regular Rhythm] : with a regular rhythm [Normal Rate] : heart rate was normal [No Edema] : no peripheral edema [Oriented x3] : oriented to person, place, and time

## 2024-02-29 NOTE — ASSESSMENT
[FreeTextEntry1] : #Atrial fibrillation/flutter - on Eliquis - stable at f/u w/ Dr. Siddiqui 9/5/23 - continue metoprolol - no c/o abnl bleeding  # cardiac murmur - c/w MR - euvolemic on exam, VSS - 1/19/24 TTE c/w nl LV sys fxn, EF 64%, grade 3 menjivar dysfxn, trace AR, mild MR, mild PA, mod TR, PASP 68 mmHg, small pericardial effusion - restart HCTZ 25 mg qd - check repeat TTE in 6 weeks   #HTN - BP today 140/67 - cont amlodipine 10 mg qd - restart HCTZ 25 mg qd - pt will continue to monitor at home and alert us if any fluctuations - check repeat TTE in 6 weeks   #HLD - cont atorvastatin 10 mg qd - normal cors on 1/2023 [facility] - labs reviewed in detail  - 12/2023 , , HDL 29, LDL 61  RTC 8 weeks

## 2024-02-29 NOTE — PHYSICAL EXAM
[Well Developed] : well developed [Well Nourished] : well nourished [No Acute Distress] : no acute distress [Normal Venous Pressure] : normal venous pressure [No Carotid Bruit] : no carotid bruit [Normal S1, S2] : normal S1, S2 [No Rub] : no rub [No Gallop] : no gallop [Murmur] : murmur [Clear Lung Fields] : clear lung fields [Good Air Entry] : good air entry [No Respiratory Distress] : no respiratory distress  [Soft] : abdomen soft [Non Tender] : non-tender [No Masses/organomegaly] : no masses/organomegaly [Normal Bowel Sounds] : normal bowel sounds [Normal Gait] : normal gait [No Edema] : no edema [No Cyanosis] : no cyanosis [No Clubbing] : no clubbing [No Varicosities] : no varicosities [No Rash] : no rash [No Skin Lesions] : no skin lesions [Moves all extremities] : moves all extremities [No Focal Deficits] : no focal deficits [Normal Speech] : normal speech [Alert and Oriented] : alert and oriented [Normal memory] : normal memory [de-identified] : L eye prosthesis [de-identified] : 3/6 HSM LSB 2-4thICS radiating to axilla

## 2024-02-29 NOTE — HISTORY OF PRESENT ILLNESS
[FreeTextEntry1] : 73F w/ HTN, HLD, CKD, Anemia 2/2 myelofibrosis on ruxolitinib, normal cors on LHC in Jan 2023, and atrial fibrillation (on apixaban) who presents for cardiac f/u. Pt reports feeling well since her previous visit in March w/ Dr. Jimenez. Patient denies any chest pain, SOB, palpitations, orthopnea, PND or LE edema. Does c/o chronic PAGAN. Compliant w/ her meds and tolerating them well.  2/9/24 OV: pt returns today for f/u and results of labs/TTE. Overall feeling well, no new complaints. Reports elevated BPs at home.   12/29/23 ECG: atrial flutter w/ VB at 66 bpm 12/16/22 EKG: NSR 71 bpm 1/19/23 EKG: sinus krysten ~55 bpm  1/3/23 MPI: normal imaging, normal EF, no chest pain, ST depressions, two runs of probable SVT 1/10/2020 TTE: normal LV/RV size/fxn, severely dilated LA, mild TR, no pericardial effusion 1/3/23 TTE: normal LV function, EF 60-65%, grade 2 DD, prbly normal RV, mildly dilated LA, mild MR, mild-mod TR, PASP 40mmHg, mild PH, trace pericardial effusion 1/4/23 LHC: no sig CAD

## 2024-03-12 NOTE — H&P ADULT - NSICDXPASTMEDICALHX_GEN_ALL_CORE_FT
PAST MEDICAL HISTORY:  HTN (hypertension)     MDS (myelodysplastic syndrome)     
no burning upon urination, no back pain, no difficulty starting urinary stream/no chills/no fever/no nausea

## 2024-03-22 ENCOUNTER — APPOINTMENT (OUTPATIENT)
Dept: HEART AND VASCULAR | Facility: CLINIC | Age: 74
End: 2024-03-22
Payer: MEDICARE

## 2024-03-22 PROCEDURE — 93306 TTE W/DOPPLER COMPLETE: CPT

## 2024-03-25 ENCOUNTER — OUTPATIENT (OUTPATIENT)
Dept: OUTPATIENT SERVICES | Facility: HOSPITAL | Age: 74
LOS: 1 days | End: 2024-03-25
Payer: MEDICARE

## 2024-03-25 ENCOUNTER — APPOINTMENT (OUTPATIENT)
Dept: RADIOLOGY | Facility: HOSPITAL | Age: 74
End: 2024-03-25
Payer: MEDICARE
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
## 2024-03-25 PROCEDURE — 77080 DXA BONE DENSITY AXIAL: CPT | Mod: 26

## 2024-03-25 PROCEDURE — 77080 DXA BONE DENSITY AXIAL: CPT

## 2024-04-18 PROBLEM — Z82.49 FAMILY HISTORY OF HYPERTENSION: Status: ACTIVE | Noted: 2024-01-25

## 2024-04-18 PROBLEM — R01.1 CARDIAC MURMUR: Status: ACTIVE | Noted: 2023-12-29

## 2024-04-18 PROBLEM — Z82.3 FAMILY HISTORY OF CEREBROVASCULAR ACCIDENT (CVA): Status: ACTIVE | Noted: 2024-01-25

## 2024-04-18 PROBLEM — I48.91 AFIB: Status: ACTIVE | Noted: 2023-12-28

## 2024-04-18 PROBLEM — Z84.1 FAMILY HISTORY OF KIDNEY DISEASE: Status: ACTIVE | Noted: 2024-01-25

## 2024-04-19 ENCOUNTER — APPOINTMENT (OUTPATIENT)
Dept: HEART AND VASCULAR | Facility: CLINIC | Age: 74
End: 2024-04-19
Payer: MEDICARE

## 2024-04-19 ENCOUNTER — NON-APPOINTMENT (OUTPATIENT)
Age: 74
End: 2024-04-19

## 2024-04-19 VITALS
HEIGHT: 65 IN | OXYGEN SATURATION: 98 % | WEIGHT: 131 LBS | TEMPERATURE: 98.1 F | BODY MASS INDEX: 21.83 KG/M2 | DIASTOLIC BLOOD PRESSURE: 62 MMHG | SYSTOLIC BLOOD PRESSURE: 122 MMHG | HEART RATE: 63 BPM

## 2024-04-19 DIAGNOSIS — I48.91 UNSPECIFIED ATRIAL FIBRILLATION: ICD-10-CM

## 2024-04-19 DIAGNOSIS — E78.5 HYPERLIPIDEMIA, UNSPECIFIED: ICD-10-CM

## 2024-04-19 DIAGNOSIS — R01.1 CARDIAC MURMUR, UNSPECIFIED: ICD-10-CM

## 2024-04-19 DIAGNOSIS — Z82.3 FAMILY HISTORY OF STROKE: ICD-10-CM

## 2024-04-19 DIAGNOSIS — Z84.1 FAMILY HISTORY OF DISORDERS OF KIDNEY AND URETER: ICD-10-CM

## 2024-04-19 DIAGNOSIS — Z82.49 FAMILY HISTORY OF ISCHEMIC HEART DISEASE AND OTHER DISEASES OF THE CIRCULATORY SYSTEM: ICD-10-CM

## 2024-04-19 PROCEDURE — G2211 COMPLEX E/M VISIT ADD ON: CPT

## 2024-04-19 PROCEDURE — 93000 ELECTROCARDIOGRAM COMPLETE: CPT

## 2024-04-19 PROCEDURE — 99214 OFFICE O/P EST MOD 30 MIN: CPT

## 2024-04-23 LAB
CYSTATIN C SERPL-MCNC: 2.15 MG/L
GFR/BSA.PRED SERPLBLD CYS-BASED-ARV: 25 ML/MIN/1.73M2
HCT VFR BLD CALC: 24.1 %
HGB BLD-MCNC: 7.5 G/DL

## 2024-04-24 LAB
ANION GAP SERPL CALC-SCNC: 15 MMOL/L
BUN SERPL-MCNC: 36 MG/DL
CALCIUM SERPL-MCNC: 9.5 MG/DL
CALCIUM SERPL-MCNC: 9.5 MG/DL
CHLORIDE SERPL-SCNC: 104 MMOL/L
CO2 SERPL-SCNC: 27 MMOL/L
CREAT SERPL-MCNC: 1.47 MG/DL
EGFR: 37 ML/MIN/1.73M2
GLUCOSE SERPL-MCNC: 73 MG/DL
PARATHYROID HORMONE INTACT: 61 PG/ML
POTASSIUM SERPL-SCNC: 4 MMOL/L
SODIUM SERPL-SCNC: 145 MMOL/L

## 2024-05-14 ENCOUNTER — APPOINTMENT (OUTPATIENT)
Dept: NEPHROLOGY | Facility: CLINIC | Age: 74
End: 2024-05-14
Payer: MEDICARE

## 2024-05-14 VITALS
SYSTOLIC BLOOD PRESSURE: 128 MMHG | WEIGHT: 131 LBS | BODY MASS INDEX: 21.83 KG/M2 | HEIGHT: 65 IN | DIASTOLIC BLOOD PRESSURE: 71 MMHG

## 2024-05-14 DIAGNOSIS — I10 ESSENTIAL (PRIMARY) HYPERTENSION: ICD-10-CM

## 2024-05-14 DIAGNOSIS — N18.9 CHRONIC KIDNEY DISEASE, UNSPECIFIED: ICD-10-CM

## 2024-05-14 DIAGNOSIS — N25.81 SECONDARY HYPERPARATHYROIDISM OF RENAL ORIGIN: ICD-10-CM

## 2024-05-14 DIAGNOSIS — D63.1 CHRONIC KIDNEY DISEASE, UNSPECIFIED: ICD-10-CM

## 2024-05-14 DIAGNOSIS — N18.30 CHRONIC KIDNEY DISEASE, STAGE 3 UNSPECIFIED: ICD-10-CM

## 2024-05-14 DIAGNOSIS — E87.20 ACIDOSIS, UNSPECIFIED: ICD-10-CM

## 2024-05-14 PROCEDURE — 99214 OFFICE O/P EST MOD 30 MIN: CPT

## 2024-05-14 PROCEDURE — G2211 COMPLEX E/M VISIT ADD ON: CPT

## 2024-05-14 NOTE — ASSESSMENT
[FreeTextEntry1] : 73-year-old woman with relatively stable renal function, excellent BP control, excellent response of secondary hyperparathyroidism to calcitriol, and now on SHANIA, pending response.  She will return in 5 to 6 months, preceded by labs.

## 2024-05-14 NOTE — CONSULT LETTER
[Dear  ___] : Dear  [unfilled], [Consult Letter:] : I had the pleasure of evaluating your patient, [unfilled]. [Please see my note below.] : Please see my note below. [Consult Closing:] : Thank you very much for allowing me to participate in the care of this patient.  If you have any questions, please do not hesitate to contact me. [Sincerely,] : Sincerely, [FreeTextEntry2] : Dr Houser [FreeTextEntry3] : Sincerely,   Nahum Mcknight MD, FACP

## 2024-05-14 NOTE — PHYSICAL EXAM
[General Appearance - Alert] : alert [General Appearance - In No Acute Distress] : in no acute distress [Sclera] : the sclera and conjunctiva were normal [PERRL With Normal Accommodation] : pupils were equal in size, round, and reactive to light [Extraocular Movements] : extraocular movements were intact [Outer Ear] : the ears and nose were normal in appearance [Oropharynx] : the oropharynx was normal [Neck Appearance] : the appearance of the neck was normal [Neck Cervical Mass (___cm)] : no neck mass was observed [Jugular Venous Distention Increased] : there was no jugular-venous distention [Auscultation Breath Sounds / Voice Sounds] : lungs were clear to auscultation bilaterally [Heart Rate And Rhythm] : heart rate was normal and rhythm regular [Heart Sounds] : normal S1 and S2 [Heart Sounds Gallop] : no gallops [Murmurs] : no murmurs [Heart Sounds Pericardial Friction Rub] : no pericardial rub [Skin Color & Pigmentation] : normal skin color and pigmentation [Skin Turgor] : normal skin turgor [] : no rash [Deep Tendon Reflexes (DTR)] : deep tendon reflexes were 2+ and symmetric [Sensation] : the sensory exam was normal to light touch and pinprick [No Focal Deficits] : no focal deficits [Oriented To Time, Place, And Person] : oriented to person, place, and time [Impaired Insight] : insight and judgment were intact [Affect] : the affect was normal [FreeTextEntry1] : left eye abnormal

## 2024-05-14 NOTE — HISTORY OF PRESENT ILLNESS
[FreeTextEntry1] : 73-year-old woman with a history of hypertension, stage IIIb CKD, myelofibrosis, severe anemia, secondary hyperparathyroidism, metabolic acidosis.  She has previously used NSAIDs, but stopped and her creatinine improved from 1.6 down to the range of 1.4-1.5.  Her latest labs from 2 weeks ago show a hemoglobin of 7.5, creatinine 1.47, BUN 36, GFR 25-37 depending on method, K4.0, CO2 27, PTH has normalized, now down to 61 on calcitriol 0.5 mcg daily.  Her hematologist is Dr. Houser, who has started her on Aranesp and she has received the first 3 doses.  It remains to be seen whether her marrow is capable of responding in view of myelofibrosis.  However she is doing remarkably well despite severe anemia, which she has become adapted to chronically.  Her BP has been excellent.  It was 118/65 when she saw Dr. Serna on February 15.  It was 122/62 on April 19 when she saw her cardiologist, Dr. Mendoza.  Her echo showed an EF of 71% with mild LVH.  Her LDL is beautifully controlled at 54.

## 2024-05-16 NOTE — HISTORY OF PRESENT ILLNESS
[FreeTextEntry1] : 73F w/ HTN, HLD, CKD, Anemia 2/2 myelofibrosis on ruxolitinib, normal cors on LHC in Jan 2023, and atrial fibrillation (on apixaban) who presents for cardiac f/u. Previously a pt of Dr. Jimenez. Denies any chest pain, SOB, palpitations, orthopnea, PND or LE edema. Does c/o chronic PAGAN.  4/19/24 OV: pt returns today for f/u and results of recent TTE. Overall feeling well, no new cardiac complaints. Compliant w/ her meds and tolerating them well. 2/9/24 OV: pt returns today for f/u and results of labs/TTE. Overall feeling well, no new complaints. Reports elevated BPs at home.   4/19/24 ECG: NSR at 60 bpm, PRWP 12/29/23 ECG: atrial flutter w/ VB at 66 bpm 12/16/22 ECG: NSR 71 bpm 1/19/23 ECG: sinus krysten ~55 bpm  1/4/23 LHC: no sig CAD 1/3/23 TTE: normal LV function, EF 60-65%, grade 2 DD, prbly normal RV, mildly dilated LA, mild MR, mild-mod TR, PASP 40mmHg, mild PH, trace pericardial effusion 1/3/23 MPI: normal imaging, normal EF, no chest pain, ST depressions, two runs of probable SVT 1/10/2020 TTE: normal LV/RV size/fxn, severely dilated LA, mild TR, no pericardial effusion

## 2024-05-16 NOTE — ASSESSMENT
[FreeTextEntry1] : #Atrial fibrillation/flutter - on Eliquis - cont f/u w/ Dr. Siddiqui - continue metoprolol - no c/o abnl bleeding  # cardiac murmur - c/w MR - euvolemic on exam, VSS - 1/19/24 TTE c/w nl LV sys fxn, grade 3 menjivar dysfxn, mild MR, mod TR, PASP 68 mmHg - cont HCTZ 25 mg qd - 3/2024 TTE c/w mild LVH, nl LV sys fxn, EF 71%, grade 2 LV menjivar dysfxn, PASP 39 mmHg, small pericardial effusion - check TTE in 4 months  # HTN - BP today 122/62 - cont amlodipine 10 mg qd - cont HCTZ 25 mg qd - 3/2024 TTE c/w mild LVH, nl LV sys fxn, EF 71%, grade 2 LV menjivar dysfxn, PASP 39 mmHg, small pericardial effusion - results discussed in detail with pt   # HLD - LDL on atorvastatin, normal cors on 1/2023 C - LDL 54 in Jan 2023 - cont atorva 10 qd - repeat labs reviewed  - results discussed in detail with pt   RTC 4 months

## 2024-05-16 NOTE — PHYSICAL EXAM
[Well Developed] : well developed [Well Nourished] : well nourished [No Acute Distress] : no acute distress [Normal Venous Pressure] : normal venous pressure [No Carotid Bruit] : no carotid bruit [Normal S1, S2] : normal S1, S2 [No Rub] : no rub [No Gallop] : no gallop [Clear Lung Fields] : clear lung fields [Good Air Entry] : good air entry [No Respiratory Distress] : no respiratory distress  [Soft] : abdomen soft [Non Tender] : non-tender [No Masses/organomegaly] : no masses/organomegaly [Normal Bowel Sounds] : normal bowel sounds [Normal Gait] : normal gait [No Edema] : no edema [No Cyanosis] : no cyanosis [No Clubbing] : no clubbing [No Varicosities] : no varicosities [No Rash] : no rash [No Skin Lesions] : no skin lesions [Moves all extremities] : moves all extremities [No Focal Deficits] : no focal deficits [Normal Speech] : normal speech [Alert and Oriented] : alert and oriented [Normal memory] : normal memory [de-identified] : L eye prosthesis [de-identified] : 3/6 HSM LSB 2-4thICS radiating to axialla

## 2024-06-07 ENCOUNTER — OUTPATIENT (OUTPATIENT)
Dept: OUTPATIENT SERVICES | Facility: HOSPITAL | Age: 74
LOS: 1 days | End: 2024-06-07
Payer: MEDICARE

## 2024-06-07 ENCOUNTER — APPOINTMENT (OUTPATIENT)
Dept: INFUSION THERAPY | Facility: CLINIC | Age: 74
End: 2024-06-07

## 2024-06-07 DIAGNOSIS — D64.9 ANEMIA, UNSPECIFIED: ICD-10-CM
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
## 2024-06-07 PROCEDURE — 86900 BLOOD TYPING SEROLOGIC ABO: CPT

## 2024-06-07 PROCEDURE — 86850 RBC ANTIBODY SCREEN: CPT

## 2024-06-07 PROCEDURE — 86901 BLOOD TYPING SEROLOGIC RH(D): CPT

## 2024-06-07 PROCEDURE — 36415 COLL VENOUS BLD VENIPUNCTURE: CPT

## 2024-06-10 ENCOUNTER — APPOINTMENT (OUTPATIENT)
Dept: INFUSION THERAPY | Facility: CLINIC | Age: 74
End: 2024-06-10

## 2024-06-10 ENCOUNTER — OUTPATIENT (OUTPATIENT)
Dept: OUTPATIENT SERVICES | Facility: HOSPITAL | Age: 74
LOS: 1 days | End: 2024-06-10
Payer: MEDICARE

## 2024-06-10 VITALS
HEART RATE: 61 BPM | HEIGHT: 66 IN | TEMPERATURE: 99 F | SYSTOLIC BLOOD PRESSURE: 131 MMHG | WEIGHT: 149.03 LBS | DIASTOLIC BLOOD PRESSURE: 72 MMHG | OXYGEN SATURATION: 98 % | RESPIRATION RATE: 18 BRPM

## 2024-06-10 VITALS
OXYGEN SATURATION: 100 % | RESPIRATION RATE: 18 BRPM | WEIGHT: 130.07 LBS | SYSTOLIC BLOOD PRESSURE: 137 MMHG | DIASTOLIC BLOOD PRESSURE: 65 MMHG | HEIGHT: 66 IN | TEMPERATURE: 96 F | HEART RATE: 71 BPM
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
## 2024-06-10 DIAGNOSIS — D64.9 ANEMIA, UNSPECIFIED: ICD-10-CM

## 2024-06-10 LAB
HCT VFR BLD CALC: 21.4 % — LOW (ref 34.5–45)
HGB BLD-MCNC: 6.5 G/DL — CRITICAL LOW (ref 11.5–15.5)
MCHC RBC-ENTMCNC: 30.4 GM/DL — LOW (ref 32–36)
MCHC RBC-ENTMCNC: 31.1 PG — SIGNIFICANT CHANGE UP (ref 27–34)
MCV RBC AUTO: 102.4 FL — HIGH (ref 80–100)
PLATELET # BLD AUTO: 217 K/UL — SIGNIFICANT CHANGE UP (ref 150–400)
RBC # BLD: 2.09 M/UL — LOW (ref 3.8–5.2)
RBC # FLD: 18.2 % — HIGH (ref 10.3–14.5)
WBC # BLD: 4 K/UL — SIGNIFICANT CHANGE UP (ref 3.8–10.5)
WBC # FLD AUTO: 4 K/UL — SIGNIFICANT CHANGE UP (ref 3.8–10.5)

## 2024-06-10 PROCEDURE — 36430 TRANSFUSION BLD/BLD COMPNT: CPT

## 2024-06-10 PROCEDURE — 86923 COMPATIBILITY TEST ELECTRIC: CPT

## 2024-06-10 PROCEDURE — 36415 COLL VENOUS BLD VENIPUNCTURE: CPT

## 2024-06-10 PROCEDURE — P9040: CPT

## 2024-06-10 PROCEDURE — 85027 COMPLETE CBC AUTOMATED: CPT

## 2024-06-12 DIAGNOSIS — M81.0 AGE-RELATED OSTEOPOROSIS W/OUT CURRENT PATHOLOGICAL FRACTURE: ICD-10-CM

## 2024-06-12 DIAGNOSIS — E55.9 VITAMIN D DEFICIENCY, UNSPECIFIED: ICD-10-CM

## 2024-06-21 ENCOUNTER — NON-APPOINTMENT (OUTPATIENT)
Age: 74
End: 2024-06-21

## 2024-06-27 LAB
25(OH)D3 SERPL-MCNC: 73 NG/ML
ALBUMIN SERPL ELPH-MCNC: 4.8 G/DL
ALP BLD-CCNC: 68 U/L
ALT SERPL-CCNC: 21 U/L
ANION GAP SERPL CALC-SCNC: 14 MMOL/L
AST SERPL-CCNC: 22 U/L
BILIRUB SERPL-MCNC: 0.4 MG/DL
BUN SERPL-MCNC: 41 MG/DL
CALCIUM SERPL-MCNC: 9.9 MG/DL
CHLORIDE SERPL-SCNC: 102 MMOL/L
CO2 SERPL-SCNC: 26 MMOL/L
CREAT SERPL-MCNC: 1.63 MG/DL
EGFR: 33 ML/MIN/1.73M2
GLUCOSE SERPL-MCNC: 83 MG/DL
POTASSIUM SERPL-SCNC: 4.3 MMOL/L
PROT SERPL-MCNC: 8 G/DL
SODIUM SERPL-SCNC: 141 MMOL/L

## 2024-07-08 ENCOUNTER — APPOINTMENT (OUTPATIENT)
Dept: ENDOCRINOLOGY | Facility: CLINIC | Age: 74
End: 2024-07-08

## 2024-07-08 VITALS
HEART RATE: 65 BPM | WEIGHT: 132 LBS | BODY MASS INDEX: 21.97 KG/M2 | DIASTOLIC BLOOD PRESSURE: 64 MMHG | SYSTOLIC BLOOD PRESSURE: 125 MMHG

## 2024-07-08 DIAGNOSIS — M81.0 AGE-RELATED OSTEOPOROSIS W/OUT CURRENT PATHOLOGICAL FRACTURE: ICD-10-CM

## 2024-07-08 DIAGNOSIS — E55.9 VITAMIN D DEFICIENCY, UNSPECIFIED: ICD-10-CM

## 2024-07-08 PROCEDURE — 99214 OFFICE O/P EST MOD 30 MIN: CPT

## 2024-07-08 RX ORDER — MULTIVIT-MIN/IRON/FOLIC ACID/K 18-600-40
50 MCG CAPSULE ORAL
Qty: 90 | Refills: 1 | Status: ACTIVE | COMMUNITY

## 2024-08-12 ENCOUNTER — APPOINTMENT (OUTPATIENT)
Dept: HEART AND VASCULAR | Facility: CLINIC | Age: 74
End: 2024-08-12
Payer: MEDICARE

## 2024-08-12 VITALS
BODY MASS INDEX: 21.99 KG/M2 | SYSTOLIC BLOOD PRESSURE: 118 MMHG | OXYGEN SATURATION: 99 % | HEART RATE: 65 BPM | HEIGHT: 65 IN | DIASTOLIC BLOOD PRESSURE: 68 MMHG | WEIGHT: 132 LBS

## 2024-08-12 PROCEDURE — 93306 TTE W/DOPPLER COMPLETE: CPT

## 2024-08-19 ENCOUNTER — APPOINTMENT (OUTPATIENT)
Dept: HEART AND VASCULAR | Facility: CLINIC | Age: 74
End: 2024-08-19

## 2024-08-19 ENCOUNTER — NON-APPOINTMENT (OUTPATIENT)
Age: 74
End: 2024-08-19

## 2024-08-23 ENCOUNTER — APPOINTMENT (OUTPATIENT)
Dept: HEART AND VASCULAR | Facility: CLINIC | Age: 74
End: 2024-08-23
Payer: MEDICARE

## 2024-08-23 ENCOUNTER — NON-APPOINTMENT (OUTPATIENT)
Age: 74
End: 2024-08-23

## 2024-08-23 VITALS
HEART RATE: 64 BPM | DIASTOLIC BLOOD PRESSURE: 70 MMHG | OXYGEN SATURATION: 100 % | TEMPERATURE: 98.3 F | SYSTOLIC BLOOD PRESSURE: 128 MMHG | HEIGHT: 65 IN | BODY MASS INDEX: 22.33 KG/M2 | WEIGHT: 134 LBS

## 2024-08-23 DIAGNOSIS — I10 ESSENTIAL (PRIMARY) HYPERTENSION: ICD-10-CM

## 2024-08-23 DIAGNOSIS — R01.1 CARDIAC MURMUR, UNSPECIFIED: ICD-10-CM

## 2024-08-23 DIAGNOSIS — E78.5 HYPERLIPIDEMIA, UNSPECIFIED: ICD-10-CM

## 2024-08-23 DIAGNOSIS — I48.91 UNSPECIFIED ATRIAL FIBRILLATION: ICD-10-CM

## 2024-08-23 PROCEDURE — 99214 OFFICE O/P EST MOD 30 MIN: CPT

## 2024-08-23 PROCEDURE — 93000 ELECTROCARDIOGRAM COMPLETE: CPT

## 2024-08-23 PROCEDURE — G2211 COMPLEX E/M VISIT ADD ON: CPT

## 2024-08-28 NOTE — HISTORY OF PRESENT ILLNESS
[FreeTextEntry1] : 74F w/ HTN, HLD, CKD, Anemia 2/2 myelofibrosis on ruxolitinib, normal cors on LH in Jan 2023, and atrial fibrillation (on apixaban) who presents for cardiac f/u. Previously a pt of Dr. Jimenez. Denies any chest pain, SOB, palpitations, orthopnea, PND or LE edema. Does c/o chronic PAGAN.  8/23/24 OV: pt returns today for f/u and results of TTE. Denies any c/o chest pain, palpitations or PAGAN. Compliant w/ all medications. No c/o abnormal bleeding.  4/19/24 OV: pt returns today for f/u and results of recent TTE. Overall feeling well, no new cardiac complaints. Compliant w/ her meds and tolerating them well. 2/9/24 OV: pt returns today for f/u and results of labs/TTE. Overall feeling well, no new complaints. Reports elevated BPs at home.   8/23/24 ECG: NSR at 64 bpm, nl axis 4/19/24 ECG: NSR at 60 bpm, PRWP 12/29/23 ECG: atrial flutter w/ VB at 66 bpm 12/16/22 ECG: NSR at 71 bpm 1/19/23 ECG: SB at 55 bpm  1/4/23 LHC: no sig CAD 1/3/23 TTE: normal LV function, EF 60-65%, grade 2 DD, prbly normal RV, mildly dilated LA, mild MR, mild-mod TR, PASP 40mmHg, mild PH, trace pericardial effusion 1/3/23 MPI: normal imaging, normal EF, no chest pain, ST depressions, two runs of probable SVT 1/10/2020 TTE: normal LV/RV size/fxn, severely dilated LA, mild TR, no pericardial effusion

## 2024-08-28 NOTE — PHYSICAL EXAM
[Well Developed] : well developed [Well Nourished] : well nourished [No Acute Distress] : no acute distress [Normal Venous Pressure] : normal venous pressure [No Carotid Bruit] : no carotid bruit [Normal S1, S2] : normal S1, S2 [No Rub] : no rub [No Gallop] : no gallop [Murmur] : murmur [Clear Lung Fields] : clear lung fields [Good Air Entry] : good air entry [No Respiratory Distress] : no respiratory distress  [Soft] : abdomen soft [Non Tender] : non-tender [No Masses/organomegaly] : no masses/organomegaly [Normal Bowel Sounds] : normal bowel sounds [Normal Gait] : normal gait [No Edema] : no edema [No Cyanosis] : no cyanosis [No Clubbing] : no clubbing [No Varicosities] : no varicosities [No Rash] : no rash [No Skin Lesions] : no skin lesions [Moves all extremities] : moves all extremities [No Focal Deficits] : no focal deficits [Normal Speech] : normal speech [Alert and Oriented] : alert and oriented [Normal memory] : normal memory [de-identified] : L eye prosthesis [de-identified] : 3/6 HSM LSB 2-4thICS radiating to axilla

## 2024-08-28 NOTE — ASSESSMENT
[FreeTextEntry1] : #Atrial fibrillation/flutter - cont eliquis - cont f/u w/ Dr. Siddiqui - cont toprol 25 mg qd - no c/o abnl bleeding  # cardiac murmur - euvolemic on exam, VSS - 1/2024 TTE c/w nl LV sys fxn, grade 3 menjivar dysfxn, mild MR, mod TR, PASP 68 mmHg - cont HCTZ 25 mg qd - 3/2024 TTE c/w mild LVH, nl LV sys fxn, EF 71%, grade 2 LV menjivar dysfxn, PASP 39 mmHg, small pericardial effusion - 8/2024 TTE c/w nl LV size and fxn, mild LVH, EF 60%, grade 2 menjivar dysfxn, mild TR, PASP 42 mmHg c/w mild pHTN, small pericardial effusion  - results discussed in detail with pt  # HTN - BP today 128/70 - cont amlodipine 10 mg qd - cont HCTZ 25 mg qd - cont toprol 25 mg qd - 3/2024 TTE c/w mild LVH, nl LV sys fxn, EF 71%, grade 2 LV menjivar dysfxn, PASP 39 mmHg, small pericardial effusion - 8/2024 TTE c/w nl LV size and fxn, mild LVH, EF 60%, grade 2 menjivar dysfxn, mild TR, PASP 42 mmHg c/w mild pHTN, small pericardial effusion  - results discussed in detail with pt  # HLD - cont atorva 10 qd - results discussed in detail with pt  RTC 4 months.

## 2024-08-28 NOTE — PHYSICAL EXAM
[Well Developed] : well developed [Well Nourished] : well nourished [No Acute Distress] : no acute distress [Normal Venous Pressure] : normal venous pressure [No Carotid Bruit] : no carotid bruit [Normal S1, S2] : normal S1, S2 [No Rub] : no rub [No Gallop] : no gallop [Murmur] : murmur [Clear Lung Fields] : clear lung fields [Good Air Entry] : good air entry [No Respiratory Distress] : no respiratory distress  [Soft] : abdomen soft [Non Tender] : non-tender [No Masses/organomegaly] : no masses/organomegaly [Normal Bowel Sounds] : normal bowel sounds [Normal Gait] : normal gait [No Edema] : no edema [No Cyanosis] : no cyanosis [No Clubbing] : no clubbing [No Varicosities] : no varicosities [No Rash] : no rash [No Skin Lesions] : no skin lesions [Moves all extremities] : moves all extremities [No Focal Deficits] : no focal deficits [Normal Speech] : normal speech [Alert and Oriented] : alert and oriented [Normal memory] : normal memory [de-identified] : L eye prosthesis [de-identified] : 3/6 HSM LSB 2-4thICS radiating to axilla

## 2024-08-28 NOTE — PHYSICAL EXAM
[Well Developed] : well developed [Well Nourished] : well nourished [No Acute Distress] : no acute distress [Normal Venous Pressure] : normal venous pressure [No Carotid Bruit] : no carotid bruit [Normal S1, S2] : normal S1, S2 [No Rub] : no rub [No Gallop] : no gallop [Murmur] : murmur [Clear Lung Fields] : clear lung fields [Good Air Entry] : good air entry [No Respiratory Distress] : no respiratory distress  [Soft] : abdomen soft [Non Tender] : non-tender [No Masses/organomegaly] : no masses/organomegaly [Normal Bowel Sounds] : normal bowel sounds [Normal Gait] : normal gait [No Edema] : no edema [No Cyanosis] : no cyanosis [No Clubbing] : no clubbing [No Varicosities] : no varicosities [No Rash] : no rash [No Skin Lesions] : no skin lesions [Moves all extremities] : moves all extremities [No Focal Deficits] : no focal deficits [Normal Speech] : normal speech [Alert and Oriented] : alert and oriented [Normal memory] : normal memory [de-identified] : L eye prosthesis [de-identified] : 3/6 HSM LSB 2-4thICS radiating to axilla

## 2024-10-15 ENCOUNTER — APPOINTMENT (OUTPATIENT)
Dept: NEPHROLOGY | Facility: CLINIC | Age: 74
End: 2024-10-15
Payer: MEDICARE

## 2024-10-15 VITALS
SYSTOLIC BLOOD PRESSURE: 134 MMHG | WEIGHT: 134 LBS | BODY MASS INDEX: 22.33 KG/M2 | DIASTOLIC BLOOD PRESSURE: 68 MMHG | HEIGHT: 65 IN

## 2024-10-15 DIAGNOSIS — Z86.39 PERSONAL HISTORY OF OTHER ENDOCRINE, NUTRITIONAL AND METABOLIC DISEASE: ICD-10-CM

## 2024-10-15 DIAGNOSIS — N25.81 SECONDARY HYPERPARATHYROIDISM OF RENAL ORIGIN: ICD-10-CM

## 2024-10-15 DIAGNOSIS — N18.9 CHRONIC KIDNEY DISEASE, UNSPECIFIED: ICD-10-CM

## 2024-10-15 DIAGNOSIS — D63.1 CHRONIC KIDNEY DISEASE, UNSPECIFIED: ICD-10-CM

## 2024-10-15 DIAGNOSIS — N18.30 CHRONIC KIDNEY DISEASE, STAGE 3 UNSPECIFIED: ICD-10-CM

## 2024-10-15 DIAGNOSIS — E87.20 ACIDOSIS, UNSPECIFIED: ICD-10-CM

## 2024-10-15 PROCEDURE — 99214 OFFICE O/P EST MOD 30 MIN: CPT

## 2024-10-15 PROCEDURE — G2211 COMPLEX E/M VISIT ADD ON: CPT

## 2024-10-17 ENCOUNTER — APPOINTMENT (OUTPATIENT)
Dept: UROLOGY | Facility: CLINIC | Age: 74
End: 2024-10-17
Payer: MEDICARE

## 2024-10-17 VITALS
SYSTOLIC BLOOD PRESSURE: 166 MMHG | WEIGHT: 138 LBS | HEART RATE: 62 BPM | OXYGEN SATURATION: 98 % | DIASTOLIC BLOOD PRESSURE: 77 MMHG | BODY MASS INDEX: 22.99 KG/M2 | HEIGHT: 65 IN

## 2024-10-17 DIAGNOSIS — R35.1 NOCTURIA: ICD-10-CM

## 2024-10-17 PROCEDURE — 51798 US URINE CAPACITY MEASURE: CPT

## 2024-10-17 PROCEDURE — 99214 OFFICE O/P EST MOD 30 MIN: CPT

## 2024-10-24 ENCOUNTER — RX RENEWAL (OUTPATIENT)
Age: 74
End: 2024-10-24

## 2024-10-25 ENCOUNTER — RX RENEWAL (OUTPATIENT)
Age: 74
End: 2024-10-25

## 2024-12-20 ENCOUNTER — LABORATORY RESULT (OUTPATIENT)
Age: 74
End: 2024-12-20

## 2024-12-20 ENCOUNTER — APPOINTMENT (OUTPATIENT)
Dept: HEART AND VASCULAR | Facility: CLINIC | Age: 74
End: 2024-12-20

## 2024-12-20 ENCOUNTER — NON-APPOINTMENT (OUTPATIENT)
Age: 74
End: 2024-12-20

## 2024-12-20 VITALS
SYSTOLIC BLOOD PRESSURE: 150 MMHG | DIASTOLIC BLOOD PRESSURE: 80 MMHG | HEART RATE: 65 BPM | BODY MASS INDEX: 22.49 KG/M2 | HEIGHT: 65 IN | TEMPERATURE: 98 F | OXYGEN SATURATION: 97 % | WEIGHT: 135 LBS

## 2024-12-20 VITALS — SYSTOLIC BLOOD PRESSURE: 138 MMHG | DIASTOLIC BLOOD PRESSURE: 60 MMHG

## 2024-12-20 DIAGNOSIS — I10 ESSENTIAL (PRIMARY) HYPERTENSION: ICD-10-CM

## 2024-12-20 DIAGNOSIS — I34.0 NONRHEUMATIC MITRAL (VALVE) INSUFFICIENCY: ICD-10-CM

## 2024-12-20 DIAGNOSIS — E78.5 HYPERLIPIDEMIA, UNSPECIFIED: ICD-10-CM

## 2024-12-20 DIAGNOSIS — R01.1 CARDIAC MURMUR, UNSPECIFIED: ICD-10-CM

## 2024-12-20 DIAGNOSIS — I07.1 RHEUMATIC TRICUSPID INSUFFICIENCY: ICD-10-CM

## 2024-12-20 DIAGNOSIS — R07.89 OTHER CHEST PAIN: ICD-10-CM

## 2024-12-20 DIAGNOSIS — I48.91 UNSPECIFIED ATRIAL FIBRILLATION: ICD-10-CM

## 2024-12-20 PROCEDURE — 93000 ELECTROCARDIOGRAM COMPLETE: CPT

## 2024-12-20 PROCEDURE — 99214 OFFICE O/P EST MOD 30 MIN: CPT

## 2024-12-20 PROCEDURE — G2211 COMPLEX E/M VISIT ADD ON: CPT

## 2024-12-21 LAB
ALBUMIN SERPL ELPH-MCNC: 4.8 G/DL
ALP BLD-CCNC: 58 U/L
ALT SERPL-CCNC: 25 U/L
ANION GAP SERPL CALC-SCNC: 15 MMOL/L
AST SERPL-CCNC: 19 U/L
BASOPHILS # BLD AUTO: 0.23 K/UL
BASOPHILS NFR BLD AUTO: 3.2 %
BILIRUB SERPL-MCNC: 0.4 MG/DL
BUN SERPL-MCNC: 42 MG/DL
CALCIUM SERPL-MCNC: 9.9 MG/DL
CHLORIDE SERPL-SCNC: 101 MMOL/L
CHOLEST SERPL-MCNC: 130 MG/DL
CO2 SERPL-SCNC: 26 MMOL/L
CREAT SERPL-MCNC: 1.71 MG/DL
EGFR: 31 ML/MIN/1.73M2
EOSINOPHIL # BLD AUTO: 0 K/UL
EOSINOPHIL NFR BLD AUTO: 0 %
ESTIMATED AVERAGE GLUCOSE: 100 MG/DL
GLUCOSE SERPL-MCNC: 89 MG/DL
HBA1C MFR BLD HPLC: 5.1 %
HCT VFR BLD CALC: 28.5 %
HDLC SERPL-MCNC: 26 MG/DL
HGB BLD-MCNC: 8.7 G/DL
LDLC SERPL CALC-MCNC: 78 MG/DL
LYMPHOCYTES # BLD AUTO: 2.85 K/UL
LYMPHOCYTES NFR BLD AUTO: 39.2 %
MAN DIFF?: NORMAL
MCHC RBC-ENTMCNC: 30.5 G/DL
MCHC RBC-ENTMCNC: 33.1 PG
MCV RBC AUTO: 108.4 FL
MONOCYTES # BLD AUTO: 0.76 K/UL
MONOCYTES NFR BLD AUTO: 10.4 %
NEUTROPHILS # BLD AUTO: 2.9 K/UL
NEUTROPHILS NFR BLD AUTO: 35.2 %
NONHDLC SERPL-MCNC: 104 MG/DL
PLATELET # BLD AUTO: 236 K/UL
POTASSIUM SERPL-SCNC: 4.4 MMOL/L
PROT SERPL-MCNC: 7.8 G/DL
RBC # BLD: 2.63 M/UL
RBC # FLD: 17.9 %
SODIUM SERPL-SCNC: 143 MMOL/L
TRIGL SERPL-MCNC: 147 MG/DL
TSH SERPL-ACNC: 5.6 UIU/ML
WBC # FLD AUTO: 7.26 K/UL

## 2024-12-26 ENCOUNTER — APPOINTMENT (OUTPATIENT)
Dept: HEART AND VASCULAR | Facility: CLINIC | Age: 74
End: 2024-12-26
Payer: MEDICARE

## 2024-12-26 DIAGNOSIS — R07.89 OTHER CHEST PAIN: ICD-10-CM

## 2024-12-26 PROCEDURE — 93306 TTE W/DOPPLER COMPLETE: CPT

## 2024-12-30 ENCOUNTER — APPOINTMENT (OUTPATIENT)
Dept: INTERNAL MEDICINE | Facility: CLINIC | Age: 74
End: 2024-12-30
Payer: MEDICARE

## 2024-12-30 VITALS
TEMPERATURE: 97.7 F | HEART RATE: 64 BPM | SYSTOLIC BLOOD PRESSURE: 129 MMHG | HEIGHT: 65 IN | DIASTOLIC BLOOD PRESSURE: 66 MMHG | WEIGHT: 137 LBS | BODY MASS INDEX: 22.82 KG/M2 | OXYGEN SATURATION: 98 %

## 2024-12-30 DIAGNOSIS — Z00.00 ENCOUNTER FOR GENERAL ADULT MEDICAL EXAMINATION W/OUT ABNORMAL FINDINGS: ICD-10-CM

## 2024-12-30 PROCEDURE — 99213 OFFICE O/P EST LOW 20 MIN: CPT

## 2024-12-30 PROCEDURE — G2211 COMPLEX E/M VISIT ADD ON: CPT

## 2024-12-31 ENCOUNTER — OUTPATIENT (OUTPATIENT)
Dept: OUTPATIENT SERVICES | Facility: HOSPITAL | Age: 74
LOS: 1 days | End: 2024-12-31
Payer: MEDICARE

## 2024-12-31 ENCOUNTER — RESULT REVIEW (OUTPATIENT)
Age: 74
End: 2024-12-31
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
## 2024-12-31 DIAGNOSIS — R07.9 CHEST PAIN, UNSPECIFIED: ICD-10-CM

## 2024-12-31 PROCEDURE — 93018 CV STRESS TEST I&R ONLY: CPT

## 2024-12-31 PROCEDURE — 78452 HT MUSCLE IMAGE SPECT MULT: CPT

## 2024-12-31 PROCEDURE — 78452 HT MUSCLE IMAGE SPECT MULT: CPT | Mod: 26,MH

## 2024-12-31 PROCEDURE — A9500: CPT

## 2024-12-31 PROCEDURE — 93017 CV STRESS TEST TRACING ONLY: CPT

## 2024-12-31 PROCEDURE — 93016 CV STRESS TEST SUPVJ ONLY: CPT

## 2025-01-10 ENCOUNTER — APPOINTMENT (OUTPATIENT)
Dept: HEART AND VASCULAR | Facility: CLINIC | Age: 75
End: 2025-01-10

## 2025-01-10 VITALS
DIASTOLIC BLOOD PRESSURE: 70 MMHG | BODY MASS INDEX: 22.82 KG/M2 | SYSTOLIC BLOOD PRESSURE: 132 MMHG | OXYGEN SATURATION: 98 % | TEMPERATURE: 98 F | WEIGHT: 137 LBS | HEIGHT: 65 IN | HEART RATE: 78 BPM

## 2025-01-10 VITALS — SYSTOLIC BLOOD PRESSURE: 125 MMHG | DIASTOLIC BLOOD PRESSURE: 60 MMHG

## 2025-01-10 DIAGNOSIS — R07.89 OTHER CHEST PAIN: ICD-10-CM

## 2025-01-10 DIAGNOSIS — I10 ESSENTIAL (PRIMARY) HYPERTENSION: ICD-10-CM

## 2025-01-10 DIAGNOSIS — I27.20 PULMONARY HYPERTENSION, UNSPECIFIED: ICD-10-CM

## 2025-01-10 DIAGNOSIS — R01.1 CARDIAC MURMUR, UNSPECIFIED: ICD-10-CM

## 2025-01-10 DIAGNOSIS — E78.5 HYPERLIPIDEMIA, UNSPECIFIED: ICD-10-CM

## 2025-01-10 DIAGNOSIS — I48.91 UNSPECIFIED ATRIAL FIBRILLATION: ICD-10-CM

## 2025-01-10 DIAGNOSIS — I48.0 PAROXYSMAL ATRIAL FIBRILLATION: ICD-10-CM

## 2025-01-10 PROCEDURE — G2211 COMPLEX E/M VISIT ADD ON: CPT

## 2025-01-10 PROCEDURE — 99214 OFFICE O/P EST MOD 30 MIN: CPT

## 2025-01-13 ENCOUNTER — APPOINTMENT (OUTPATIENT)
Dept: ENDOCRINOLOGY | Facility: CLINIC | Age: 75
End: 2025-01-13
Payer: MEDICARE

## 2025-01-13 VITALS
SYSTOLIC BLOOD PRESSURE: 120 MMHG | BODY MASS INDEX: 22.8 KG/M2 | WEIGHT: 137 LBS | DIASTOLIC BLOOD PRESSURE: 67 MMHG | HEART RATE: 73 BPM

## 2025-01-13 DIAGNOSIS — M81.0 AGE-RELATED OSTEOPOROSIS W/OUT CURRENT PATHOLOGICAL FRACTURE: ICD-10-CM

## 2025-01-13 PROCEDURE — 36415 COLL VENOUS BLD VENIPUNCTURE: CPT

## 2025-01-13 PROCEDURE — 99213 OFFICE O/P EST LOW 20 MIN: CPT

## 2025-01-13 PROCEDURE — G2211 COMPLEX E/M VISIT ADD ON: CPT

## 2025-01-14 VITALS
OXYGEN SATURATION: 98 % | SYSTOLIC BLOOD PRESSURE: 133 MMHG | DIASTOLIC BLOOD PRESSURE: 62 MMHG | RESPIRATION RATE: 16 BRPM | HEART RATE: 63 BPM | TEMPERATURE: 97 F

## 2025-01-14 NOTE — H&P ADULT - NSHPLABSRESULTS_GEN_ALL_CORE
8.1    6.51  )-----------( 171      ( 04 Feb 2025 09:04 )             26.4         Mg     1.9     02-04        PT/INR - ( 04 Feb 2025 09:04 )   PT: 12.3 sec;   INR: 1.05          PTT - ( 04 Feb 2025 09:04 )  PTT:35.2 sec              EKG:

## 2025-01-14 NOTE — H&P ADULT - NSICDXFAMILYHX_GEN_ALL_CORE_FT
FAMILY HISTORY:  Mother  Still living? Unknown  Family history of CVA, Age at diagnosis: Age Unknown    Sibling  Still living? Unknown  FH: CAD (coronary artery disease), Age at diagnosis: Age Unknown
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
## 2025-01-16 ENCOUNTER — APPOINTMENT (OUTPATIENT)
Dept: UROLOGY | Facility: CLINIC | Age: 75
End: 2025-01-16
Payer: MEDICARE

## 2025-01-16 ENCOUNTER — NON-APPOINTMENT (OUTPATIENT)
Age: 75
End: 2025-01-16

## 2025-01-16 VITALS
OXYGEN SATURATION: 99 % | HEIGHT: 65 IN | SYSTOLIC BLOOD PRESSURE: 152 MMHG | HEART RATE: 69 BPM | BODY MASS INDEX: 22.99 KG/M2 | WEIGHT: 138 LBS | TEMPERATURE: 97.3 F | DIASTOLIC BLOOD PRESSURE: 73 MMHG

## 2025-01-16 DIAGNOSIS — R35.1 NOCTURIA: ICD-10-CM

## 2025-01-16 PROCEDURE — 99213 OFFICE O/P EST LOW 20 MIN: CPT

## 2025-01-16 PROCEDURE — G2211 COMPLEX E/M VISIT ADD ON: CPT

## 2025-01-21 ENCOUNTER — RX RENEWAL (OUTPATIENT)
Age: 75
End: 2025-01-21

## 2025-01-22 ENCOUNTER — RX RENEWAL (OUTPATIENT)
Age: 75
End: 2025-01-22

## 2025-01-23 ENCOUNTER — APPOINTMENT (OUTPATIENT)
Dept: INFUSION THERAPY | Facility: CLINIC | Age: 75
End: 2025-01-23

## 2025-01-23 ENCOUNTER — OUTPATIENT (OUTPATIENT)
Dept: OUTPATIENT SERVICES | Facility: HOSPITAL | Age: 75
LOS: 1 days | End: 2025-01-23
Payer: MEDICARE

## 2025-01-23 VITALS
RESPIRATION RATE: 18 BRPM | DIASTOLIC BLOOD PRESSURE: 76 MMHG | HEART RATE: 61 BPM | WEIGHT: 141.1 LBS | HEIGHT: 66 IN | OXYGEN SATURATION: 98 % | TEMPERATURE: 98 F | SYSTOLIC BLOOD PRESSURE: 136 MMHG

## 2025-01-23 DIAGNOSIS — Z98.890 OTHER SPECIFIED POSTPROCEDURAL STATES: Chronic | ICD-10-CM
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
## 2025-01-23 DIAGNOSIS — M81.0 AGE-RELATED OSTEOPOROSIS WITHOUT CURRENT PATHOLOGICAL FRACTURE: ICD-10-CM

## 2025-01-23 LAB
25(OH)D3 SERPL-MCNC: 79.2 NG/ML
ANION GAP SERPL CALC-SCNC: 15 MMOL/L
BUN SERPL-MCNC: 34 MG/DL
CALCIUM SERPL-MCNC: 9.6 MG/DL
CHLORIDE SERPL-SCNC: 103 MMOL/L
CO2 SERPL-SCNC: 25 MMOL/L
CREAT SERPL-MCNC: 1.61 MG/DL
EGFR: 33 ML/MIN/1.73M2
GLUCOSE SERPL-MCNC: 91 MG/DL
POTASSIUM SERPL-SCNC: 4 MMOL/L
SODIUM SERPL-SCNC: 143 MMOL/L

## 2025-01-23 PROCEDURE — 96372 THER/PROPH/DIAG INJ SC/IM: CPT

## 2025-01-23 RX ORDER — DENOSUMAB 120 MG/1.7ML
60 INJECTION SUBCUTANEOUS ONCE
Refills: 0 | Status: COMPLETED | OUTPATIENT
Start: 2025-01-23 | End: 2025-01-23

## 2025-01-23 RX ADMIN — DENOSUMAB 60 MILLIGRAM(S): 120 INJECTION SUBCUTANEOUS at 13:54
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
## 2025-01-28 ENCOUNTER — APPOINTMENT (OUTPATIENT)
Dept: HEART AND VASCULAR | Facility: CLINIC | Age: 75
End: 2025-01-28

## 2025-01-28 PROCEDURE — 93225 XTRNL ECG REC<48 HRS REC: CPT

## 2025-02-04 ENCOUNTER — OUTPATIENT (OUTPATIENT)
Dept: OUTPATIENT SERVICES | Facility: HOSPITAL | Age: 75
LOS: 1 days | Discharge: HOME | End: 2025-02-04
Payer: MEDICARE
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
## 2025-02-04 DIAGNOSIS — Z98.890 OTHER SPECIFIED POSTPROCEDURAL STATES: Chronic | ICD-10-CM

## 2025-02-04 LAB
A1C WITH ESTIMATED AVERAGE GLUCOSE RESULT: 5 % — SIGNIFICANT CHANGE UP (ref 4–5.6)
ALBUMIN SERPL ELPH-MCNC: 5 G/DL — SIGNIFICANT CHANGE UP (ref 3.3–5)
ALP SERPL-CCNC: 75 U/L — SIGNIFICANT CHANGE UP (ref 40–120)
ALT FLD-CCNC: 36 U/L — SIGNIFICANT CHANGE UP (ref 10–45)
ANION GAP SERPL CALC-SCNC: 15 MMOL/L — SIGNIFICANT CHANGE UP (ref 5–17)
APTT BLD: 35.2 SEC — SIGNIFICANT CHANGE UP (ref 24.5–35.6)
AST SERPL-CCNC: 24 U/L — SIGNIFICANT CHANGE UP (ref 10–40)
BASOPHILS # BLD AUTO: 0.06 K/UL — SIGNIFICANT CHANGE UP (ref 0–0.2)
BASOPHILS NFR BLD AUTO: 0.9 % — SIGNIFICANT CHANGE UP (ref 0–2)
BILIRUB SERPL-MCNC: 0.4 MG/DL — SIGNIFICANT CHANGE UP (ref 0.2–1.2)
BUN SERPL-MCNC: 35 MG/DL — HIGH (ref 7–23)
CALCIUM SERPL-MCNC: 9.9 MG/DL — SIGNIFICANT CHANGE UP (ref 8.4–10.5)
CHLORIDE SERPL-SCNC: 102 MMOL/L — SIGNIFICANT CHANGE UP (ref 96–108)
CHOLEST SERPL-MCNC: 127 MG/DL — SIGNIFICANT CHANGE UP
CK MB CFR SERPL CALC: 2.1 NG/ML — SIGNIFICANT CHANGE UP (ref 0–6.7)
CK SERPL-CCNC: 76 U/L — SIGNIFICANT CHANGE UP (ref 25–170)
CO2 SERPL-SCNC: 24 MMOL/L — SIGNIFICANT CHANGE UP (ref 22–31)
CREAT SERPL-MCNC: 1.5 MG/DL — HIGH (ref 0.5–1.3)
EGFR: 36 ML/MIN/1.73M2 — LOW
EOSINOPHIL # BLD AUTO: 0 K/UL — SIGNIFICANT CHANGE UP (ref 0–0.5)
EOSINOPHIL NFR BLD AUTO: 0 % — SIGNIFICANT CHANGE UP (ref 0–6)
ESTIMATED AVERAGE GLUCOSE: 97 MG/DL — SIGNIFICANT CHANGE UP (ref 68–114)
GLUCOSE SERPL-MCNC: 83 MG/DL — SIGNIFICANT CHANGE UP (ref 70–99)
HCT VFR BLD CALC: 26.4 % — LOW (ref 34.5–45)
HDLC SERPL-MCNC: 29 MG/DL — LOW
HGB BLD-MCNC: 8.1 G/DL — LOW (ref 11.5–15.5)
INR BLD: 1.05 — SIGNIFICANT CHANGE UP (ref 0.85–1.16)
LIPID PNL WITH DIRECT LDL SERPL: 77 MG/DL — SIGNIFICANT CHANGE UP
LYMPHOCYTES # BLD AUTO: 2.15 K/UL — SIGNIFICANT CHANGE UP (ref 1–3.3)
LYMPHOCYTES # BLD AUTO: 33 % — SIGNIFICANT CHANGE UP (ref 13–44)
MAGNESIUM SERPL-MCNC: 1.9 MG/DL — SIGNIFICANT CHANGE UP (ref 1.6–2.6)
MCHC RBC-ENTMCNC: 30.7 G/DL — LOW (ref 32–36)
MCHC RBC-ENTMCNC: 31.8 PG — SIGNIFICANT CHANGE UP (ref 27–34)
MCV RBC AUTO: 103.5 FL — HIGH (ref 80–100)
MONOCYTES # BLD AUTO: 0.35 K/UL — SIGNIFICANT CHANGE UP (ref 0–0.9)
MONOCYTES NFR BLD AUTO: 5.4 % — SIGNIFICANT CHANGE UP (ref 2–14)
NEUTROPHILS # BLD AUTO: 3.54 K/UL — SIGNIFICANT CHANGE UP (ref 1.8–7.4)
NEUTROPHILS NFR BLD AUTO: 44.6 % — SIGNIFICANT CHANGE UP (ref 43–77)
NON HDL CHOLESTEROL: 98 MG/DL — SIGNIFICANT CHANGE UP
NRBC # BLD: SIGNIFICANT CHANGE UP /100 WBCS (ref 0–0)
NRBC BLD-RTO: SIGNIFICANT CHANGE UP /100 WBCS (ref 0–0)
PLATELET # BLD AUTO: 171 K/UL — SIGNIFICANT CHANGE UP (ref 150–400)
POTASSIUM SERPL-MCNC: 3.4 MMOL/L — LOW (ref 3.5–5.3)
POTASSIUM SERPL-SCNC: 3.4 MMOL/L — LOW (ref 3.5–5.3)
PROT SERPL-MCNC: 8.6 G/DL — HIGH (ref 6–8.3)
PROTHROM AB SERPL-ACNC: 12.3 SEC — SIGNIFICANT CHANGE UP (ref 9.9–13.4)
RBC # BLD: 2.55 M/UL — LOW (ref 3.8–5.2)
RBC # FLD: 17.1 % — HIGH (ref 10.3–14.5)
SODIUM SERPL-SCNC: 141 MMOL/L — SIGNIFICANT CHANGE UP (ref 135–145)
TRIGL SERPL-MCNC: 116 MG/DL — SIGNIFICANT CHANGE UP
WBC # BLD: 6.51 K/UL — SIGNIFICANT CHANGE UP (ref 3.8–10.5)
WBC # FLD AUTO: 6.51 K/UL — SIGNIFICANT CHANGE UP (ref 3.8–10.5)

## 2025-02-04 PROCEDURE — 93010 ELECTROCARDIOGRAM REPORT: CPT

## 2025-02-04 PROCEDURE — 83036 HEMOGLOBIN GLYCOSYLATED A1C: CPT

## 2025-02-04 PROCEDURE — 80061 LIPID PANEL: CPT

## 2025-02-04 PROCEDURE — 85610 PROTHROMBIN TIME: CPT

## 2025-02-04 PROCEDURE — 85730 THROMBOPLASTIN TIME PARTIAL: CPT

## 2025-02-04 PROCEDURE — 85025 COMPLETE CBC W/AUTO DIFF WBC: CPT

## 2025-02-04 PROCEDURE — 93005 ELECTROCARDIOGRAM TRACING: CPT

## 2025-02-04 PROCEDURE — 85347 COAGULATION TIME ACTIVATED: CPT

## 2025-02-04 PROCEDURE — 93799 UNLISTED CV SVC/PROCEDURE: CPT

## 2025-02-04 PROCEDURE — 36415 COLL VENOUS BLD VENIPUNCTURE: CPT

## 2025-02-04 PROCEDURE — 93454 CORONARY ARTERY ANGIO S&I: CPT | Mod: 26

## 2025-02-04 PROCEDURE — 83735 ASSAY OF MAGNESIUM: CPT

## 2025-02-04 PROCEDURE — C1894: CPT

## 2025-02-04 PROCEDURE — 80053 COMPREHEN METABOLIC PANEL: CPT

## 2025-02-04 PROCEDURE — 99152 MOD SED SAME PHYS/QHP 5/>YRS: CPT

## 2025-02-04 PROCEDURE — C1887: CPT

## 2025-02-04 PROCEDURE — 93454 CORONARY ARTERY ANGIO S&I: CPT

## 2025-02-04 PROCEDURE — C1769: CPT

## 2025-02-04 PROCEDURE — 82550 ASSAY OF CK (CPK): CPT

## 2025-02-04 PROCEDURE — 93571 IV DOP VEL&/PRESS C FLO 1ST: CPT | Mod: 26,LD

## 2025-02-04 PROCEDURE — 82553 CREATINE MB FRACTION: CPT

## 2025-02-04 RX ORDER — RUXOLITINIB 20 MG/1
4 TABLET ORAL
Refills: 0 | DISCHARGE

## 2025-02-04 RX ORDER — HYDROCHLOROTHIAZIDE 50 MG
1 TABLET ORAL
Refills: 0 | DISCHARGE

## 2025-02-04 RX ORDER — POTASSIUM CHLORIDE 750 MG/1
40 TABLET, EXTENDED RELEASE ORAL ONCE
Refills: 0 | Status: COMPLETED | OUTPATIENT
Start: 2025-02-04 | End: 2025-02-04

## 2025-02-04 RX ORDER — DILTIAZEM HYDROCHLORIDE 60 MG/1
1 TABLET ORAL
Qty: 30 | Refills: 2
Start: 2025-02-04 | End: 2025-05-04

## 2025-02-04 RX ORDER — BACTERIOSTATIC SODIUM CHLORIDE 0.9 %
1000 VIAL (ML) INJECTION
Refills: 0 | Status: DISCONTINUED | OUTPATIENT
Start: 2025-02-04 | End: 2025-02-18

## 2025-02-04 RX ORDER — MAGNESIUM OXIDE 400 MG
400 TABLET ORAL ONCE
Refills: 0 | Status: COMPLETED | OUTPATIENT
Start: 2025-02-04 | End: 2025-02-04

## 2025-02-04 RX ADMIN — Medication 400 MILLIGRAM(S): at 10:43

## 2025-02-04 RX ADMIN — Medication 125 MILLILITER(S): at 13:31

## 2025-02-04 RX ADMIN — POTASSIUM CHLORIDE 40 MILLIEQUIVALENT(S): 750 TABLET, EXTENDED RELEASE ORAL at 13:58
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
## 2025-02-11 DIAGNOSIS — I25.110 ATHEROSCLEROTIC HEART DISEASE OF NATIVE CORONARY ARTERY WITH UNSTABLE ANGINA PECTORIS: ICD-10-CM

## 2025-02-13 PROBLEM — I31.39 PERICARDIAL EFFUSION WITHOUT CARDIAC TAMPONADE: Status: ACTIVE | Noted: 2018-12-17

## 2025-02-14 ENCOUNTER — APPOINTMENT (OUTPATIENT)
Dept: HEART AND VASCULAR | Facility: CLINIC | Age: 75
End: 2025-02-14

## 2025-02-14 VITALS
DIASTOLIC BLOOD PRESSURE: 80 MMHG | BODY MASS INDEX: 23.32 KG/M2 | SYSTOLIC BLOOD PRESSURE: 140 MMHG | OXYGEN SATURATION: 98 % | HEIGHT: 65 IN | WEIGHT: 140 LBS | TEMPERATURE: 97.8 F | HEART RATE: 78 BPM

## 2025-02-14 DIAGNOSIS — R07.89 OTHER CHEST PAIN: ICD-10-CM

## 2025-02-14 DIAGNOSIS — R01.1 CARDIAC MURMUR, UNSPECIFIED: ICD-10-CM

## 2025-02-14 DIAGNOSIS — I25.10 ATHEROSCLEROTIC HEART DISEASE OF NATIVE CORONARY ARTERY W/OUT ANGINA PECTORIS: ICD-10-CM

## 2025-02-14 DIAGNOSIS — E78.5 HYPERLIPIDEMIA, UNSPECIFIED: ICD-10-CM

## 2025-02-14 DIAGNOSIS — I25.85 CHRONIC CORONARY MICROVASCULAR DYSFUNCTION: ICD-10-CM

## 2025-02-14 DIAGNOSIS — I48.0 PAROXYSMAL ATRIAL FIBRILLATION: ICD-10-CM

## 2025-02-14 DIAGNOSIS — I48.91 UNSPECIFIED ATRIAL FIBRILLATION: ICD-10-CM

## 2025-02-14 DIAGNOSIS — I20.1 ANGINA PECTORIS WITH DOCUMENTED SPASM: ICD-10-CM

## 2025-02-14 DIAGNOSIS — I31.39 OTHER PERICARDIAL EFFUSION (NONINFLAMMATORY): ICD-10-CM

## 2025-02-14 PROCEDURE — 99214 OFFICE O/P EST MOD 30 MIN: CPT

## 2025-02-14 PROCEDURE — G2211 COMPLEX E/M VISIT ADD ON: CPT

## 2025-02-14 RX ORDER — DILTIAZEM HYDROCHLORIDE 120 MG/1
120 CAPSULE, EXTENDED RELEASE ORAL DAILY
Qty: 90 | Refills: 1 | Status: ACTIVE | COMMUNITY
Start: 1900-01-01 | End: 1900-01-01

## 2025-02-14 RX ORDER — ASPIRIN 81 MG/1
81 TABLET ORAL
Qty: 90 | Refills: 1 | Status: ACTIVE | COMMUNITY
Start: 2025-02-14 | End: 1900-01-01

## 2025-02-19 ENCOUNTER — NON-APPOINTMENT (OUTPATIENT)
Age: 75
End: 2025-02-19

## 2025-02-19 ENCOUNTER — APPOINTMENT (OUTPATIENT)
Dept: NEPHROLOGY | Facility: CLINIC | Age: 75
End: 2025-02-19
Payer: MEDICARE

## 2025-02-19 VITALS
HEIGHT: 65 IN | DIASTOLIC BLOOD PRESSURE: 72 MMHG | BODY MASS INDEX: 23.32 KG/M2 | WEIGHT: 140 LBS | SYSTOLIC BLOOD PRESSURE: 148 MMHG

## 2025-02-19 DIAGNOSIS — N25.81 SECONDARY HYPERPARATHYROIDISM OF RENAL ORIGIN: ICD-10-CM

## 2025-02-19 DIAGNOSIS — D63.1 CHRONIC KIDNEY DISEASE, UNSPECIFIED: ICD-10-CM

## 2025-02-19 DIAGNOSIS — N18.30 CHRONIC KIDNEY DISEASE, STAGE 3 UNSPECIFIED: ICD-10-CM

## 2025-02-19 DIAGNOSIS — I10 ESSENTIAL (PRIMARY) HYPERTENSION: ICD-10-CM

## 2025-02-19 DIAGNOSIS — N18.9 CHRONIC KIDNEY DISEASE, UNSPECIFIED: ICD-10-CM

## 2025-02-19 PROCEDURE — 99215 OFFICE O/P EST HI 40 MIN: CPT

## 2025-02-19 PROCEDURE — G2211 COMPLEX E/M VISIT ADD ON: CPT

## 2025-02-20 PROBLEM — I20.1 CORONARY ARTERY VASOSPASM: Status: ACTIVE | Noted: 2025-02-20

## 2025-02-20 PROBLEM — I25.85 CARDIAC MICROVASCULAR DISEASE: Status: ACTIVE | Noted: 2025-02-20

## 2025-03-13 ENCOUNTER — APPOINTMENT (OUTPATIENT)
Dept: UROLOGY | Facility: CLINIC | Age: 75
End: 2025-03-13

## 2025-04-17 ENCOUNTER — APPOINTMENT (OUTPATIENT)
Dept: UROLOGY | Facility: CLINIC | Age: 75
End: 2025-04-17
Payer: MEDICARE

## 2025-04-17 VITALS
SYSTOLIC BLOOD PRESSURE: 136 MMHG | HEIGHT: 65 IN | DIASTOLIC BLOOD PRESSURE: 66 MMHG | OXYGEN SATURATION: 100 % | HEART RATE: 61 BPM | WEIGHT: 140 LBS | BODY MASS INDEX: 23.32 KG/M2 | TEMPERATURE: 98.2 F

## 2025-04-17 DIAGNOSIS — R35.1 NOCTURIA: ICD-10-CM

## 2025-04-17 DIAGNOSIS — R39.9 UNSPECIFIED SYMPTOMS AND SIGNS INVOLVING THE GENITOURINARY SYSTEM: ICD-10-CM

## 2025-04-17 PROCEDURE — 99213 OFFICE O/P EST LOW 20 MIN: CPT

## 2025-04-17 PROCEDURE — G2211 COMPLEX E/M VISIT ADD ON: CPT

## 2025-04-18 LAB
APPEARANCE: CLEAR
BACTERIA: NEGATIVE /HPF
BILIRUBIN URINE: NEGATIVE
BLOOD URINE: NEGATIVE
CAST: 2 /LPF
COLOR: YELLOW
EPITHELIAL CELLS: 3 /HPF
GLUCOSE QUALITATIVE U: NEGATIVE MG/DL
KETONES URINE: ABNORMAL MG/DL
LEUKOCYTE ESTERASE URINE: ABNORMAL
MICROSCOPIC-UA: NORMAL
NITRITE URINE: NEGATIVE
PH URINE: 5.5
PROTEIN URINE: NORMAL MG/DL
RED BLOOD CELLS URINE: 3 /HPF
SPECIFIC GRAVITY URINE: 1.02
UROBILINOGEN URINE: 0.2 MG/DL
WHITE BLOOD CELLS URINE: 34 /HPF

## 2025-04-21 LAB — BACTERIA UR CULT: NORMAL

## 2025-05-09 ENCOUNTER — APPOINTMENT (OUTPATIENT)
Dept: HEART AND VASCULAR | Facility: CLINIC | Age: 75
End: 2025-05-09

## 2025-05-09 ENCOUNTER — NON-APPOINTMENT (OUTPATIENT)
Age: 75
End: 2025-05-09

## 2025-05-09 ENCOUNTER — LABORATORY RESULT (OUTPATIENT)
Age: 75
End: 2025-05-09

## 2025-05-09 VITALS
BODY MASS INDEX: 21.99 KG/M2 | HEART RATE: 59 BPM | SYSTOLIC BLOOD PRESSURE: 140 MMHG | DIASTOLIC BLOOD PRESSURE: 71 MMHG | HEIGHT: 65 IN | WEIGHT: 132 LBS | OXYGEN SATURATION: 99 %

## 2025-05-09 PROCEDURE — G2211 COMPLEX E/M VISIT ADD ON: CPT

## 2025-05-09 PROCEDURE — 99214 OFFICE O/P EST MOD 30 MIN: CPT

## 2025-05-09 PROCEDURE — 93000 ELECTROCARDIOGRAM COMPLETE: CPT

## 2025-05-12 ENCOUNTER — APPOINTMENT (OUTPATIENT)
Dept: HEART AND VASCULAR | Facility: CLINIC | Age: 75
End: 2025-05-12

## 2025-05-12 DIAGNOSIS — I10 ESSENTIAL (PRIMARY) HYPERTENSION: ICD-10-CM

## 2025-05-12 DIAGNOSIS — I48.91 UNSPECIFIED ATRIAL FIBRILLATION: ICD-10-CM

## 2025-05-12 DIAGNOSIS — I27.20 PULMONARY HYPERTENSION, UNSPECIFIED: ICD-10-CM

## 2025-05-12 DIAGNOSIS — I31.39 OTHER PERICARDIAL EFFUSION (NONINFLAMMATORY): ICD-10-CM

## 2025-05-12 DIAGNOSIS — E78.5 HYPERLIPIDEMIA, UNSPECIFIED: ICD-10-CM

## 2025-05-12 DIAGNOSIS — I50.30 UNSPECIFIED DIASTOLIC (CONGESTIVE) HEART FAILURE: ICD-10-CM

## 2025-05-12 DIAGNOSIS — I20.1 ANGINA PECTORIS WITH DOCUMENTED SPASM: ICD-10-CM

## 2025-05-12 DIAGNOSIS — I25.85 CHRONIC CORONARY MICROVASCULAR DYSFUNCTION: ICD-10-CM

## 2025-05-12 DIAGNOSIS — I48.0 PAROXYSMAL ATRIAL FIBRILLATION: ICD-10-CM

## 2025-05-12 DIAGNOSIS — I25.10 ATHEROSCLEROTIC HEART DISEASE OF NATIVE CORONARY ARTERY W/OUT ANGINA PECTORIS: ICD-10-CM

## 2025-05-12 LAB
ALBUMIN SERPL ELPH-MCNC: 4.5 G/DL
ALP BLD-CCNC: 63 U/L
ALT SERPL-CCNC: 31 U/L
ANION GAP SERPL CALC-SCNC: 14 MMOL/L
AST SERPL-CCNC: 25 U/L
BASOPHILS # BLD AUTO: 0 K/UL
BASOPHILS NFR BLD AUTO: 0 %
BILIRUB SERPL-MCNC: 0.4 MG/DL
BUN SERPL-MCNC: 31 MG/DL
CALCIUM SERPL-MCNC: 9.3 MG/DL
CHLORIDE SERPL-SCNC: 105 MMOL/L
CHOLEST SERPL-MCNC: 128 MG/DL
CO2 SERPL-SCNC: 23 MMOL/L
CREAT SERPL-MCNC: 1.52 MG/DL
EGFRCR SERPLBLD CKD-EPI 2021: 36 ML/MIN/1.73M2
EOSINOPHIL # BLD AUTO: 0.33 K/UL
EOSINOPHIL NFR BLD AUTO: 4.4 %
ESTIMATED AVERAGE GLUCOSE: 100 MG/DL
GLUCOSE SERPL-MCNC: 84 MG/DL
HBA1C MFR BLD HPLC: 5.1 %
HCT VFR BLD CALC: 25.9 %
HDLC SERPL-MCNC: 28 MG/DL
HGB BLD-MCNC: 7.6 G/DL
LDLC SERPL-MCNC: 80 MG/DL
LYMPHOCYTES # BLD AUTO: 2 K/UL
LYMPHOCYTES NFR BLD AUTO: 27 %
MAN DIFF?: NORMAL
MCHC RBC-ENTMCNC: 29.3 G/DL
MCHC RBC-ENTMCNC: 31.9 PG
MCV RBC AUTO: 108.8 FL
MONOCYTES # BLD AUTO: 0.32 K/UL
MONOCYTES NFR BLD AUTO: 4.3 %
NEUTROPHILS # BLD AUTO: 3.6 K/UL
NEUTROPHILS NFR BLD AUTO: 43.5 %
NONHDLC SERPL-MCNC: 100 MG/DL
PLATELET # BLD AUTO: 163 K/UL
POTASSIUM SERPL-SCNC: 4.4 MMOL/L
PROT SERPL-MCNC: 7.5 G/DL
RBC # BLD: 2.38 M/UL
RBC # FLD: 18.6 %
SODIUM SERPL-SCNC: 142 MMOL/L
T3FREE SERPL-MCNC: 2.54 PG/ML
T4 FREE SERPL-MCNC: 1.1 NG/DL
TRIGL SERPL-MCNC: 105 MG/DL
TSH SERPL-ACNC: 2.97 UIU/ML
WBC # FLD AUTO: 7.4 K/UL

## 2025-05-12 PROCEDURE — G2211 COMPLEX E/M VISIT ADD ON: CPT | Mod: 93

## 2025-05-12 PROCEDURE — 99214 OFFICE O/P EST MOD 30 MIN: CPT | Mod: 93

## 2025-05-12 RX ORDER — ISOSORBIDE MONONITRATE 30 MG/1
30 TABLET, EXTENDED RELEASE ORAL DAILY
Qty: 90 | Refills: 1 | Status: ACTIVE | COMMUNITY
Start: 2025-05-12 | End: 1900-01-01

## 2025-05-12 RX ORDER — ATORVASTATIN CALCIUM 40 MG/1
40 TABLET, FILM COATED ORAL
Qty: 90 | Refills: 1 | Status: ACTIVE | COMMUNITY
Start: 2025-05-12 | End: 1900-01-01

## 2025-06-09 PROCEDURE — 93784 AMBL BP MNTR W/SOFTWARE: CPT

## 2025-06-12 LAB
25(OH)D3 SERPL-MCNC: 77.6 NG/ML
ALBUMIN SERPL ELPH-MCNC: 4.4 G/DL
ALP BLD-CCNC: 77 U/L
ALT SERPL-CCNC: 25 U/L
ANION GAP SERPL CALC-SCNC: 14 MMOL/L
AST SERPL-CCNC: 21 U/L
BILIRUB SERPL-MCNC: 0.6 MG/DL
BUN SERPL-MCNC: 22 MG/DL
CALCIUM SERPL-MCNC: 8.8 MG/DL
CHLORIDE SERPL-SCNC: 107 MMOL/L
CO2 SERPL-SCNC: 22 MMOL/L
CREAT SERPL-MCNC: 1.75 MG/DL
EGFRCR SERPLBLD CKD-EPI 2021: 30 ML/MIN/1.73M2
GLUCOSE SERPL-MCNC: 101 MG/DL
POTASSIUM SERPL-SCNC: 4 MMOL/L
PROT SERPL-MCNC: 7.6 G/DL
SODIUM SERPL-SCNC: 142 MMOL/L

## 2025-06-19 ENCOUNTER — APPOINTMENT (OUTPATIENT)
Dept: NEPHROLOGY | Facility: CLINIC | Age: 75
End: 2025-06-19
Payer: MEDICARE

## 2025-06-19 VITALS
HEIGHT: 65 IN | WEIGHT: 132 LBS | BODY MASS INDEX: 21.99 KG/M2 | SYSTOLIC BLOOD PRESSURE: 126 MMHG | DIASTOLIC BLOOD PRESSURE: 68 MMHG

## 2025-06-19 PROCEDURE — 99214 OFFICE O/P EST MOD 30 MIN: CPT

## 2025-06-19 PROCEDURE — G2211 COMPLEX E/M VISIT ADD ON: CPT

## 2025-07-10 ENCOUNTER — APPOINTMENT (OUTPATIENT)
Dept: ENDOCRINOLOGY | Facility: CLINIC | Age: 75
End: 2025-07-10

## 2025-07-29 ENCOUNTER — APPOINTMENT (OUTPATIENT)
Dept: INFUSION THERAPY | Facility: CLINIC | Age: 75
End: 2025-07-29

## 2025-07-29 ENCOUNTER — OUTPATIENT (OUTPATIENT)
Dept: OUTPATIENT SERVICES | Facility: HOSPITAL | Age: 75
LOS: 1 days | End: 2025-07-29
Payer: MEDICARE

## 2025-07-29 VITALS
TEMPERATURE: 97 F | RESPIRATION RATE: 18 BRPM | OXYGEN SATURATION: 100 % | HEART RATE: 64 BPM | SYSTOLIC BLOOD PRESSURE: 110 MMHG | DIASTOLIC BLOOD PRESSURE: 64 MMHG

## 2025-07-29 DIAGNOSIS — M81.0 AGE-RELATED OSTEOPOROSIS WITHOUT CURRENT PATHOLOGICAL FRACTURE: ICD-10-CM

## 2025-07-29 DIAGNOSIS — Z98.890 OTHER SPECIFIED POSTPROCEDURAL STATES: Chronic | ICD-10-CM
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
## 2025-07-29 PROCEDURE — 96372 THER/PROPH/DIAG INJ SC/IM: CPT

## 2025-07-29 RX ORDER — DENOSUMAB 60 MG/ML
60 INJECTION SUBCUTANEOUS ONCE
Refills: 0 | Status: COMPLETED | OUTPATIENT
Start: 2025-07-29 | End: 2025-07-29

## 2025-07-29 RX ADMIN — DENOSUMAB 60 MILLIGRAM(S): 60 INJECTION SUBCUTANEOUS at 11:55

## 2025-07-30 ENCOUNTER — APPOINTMENT (OUTPATIENT)
Dept: INFUSION THERAPY | Facility: CLINIC | Age: 75
End: 2025-07-30

## 2025-07-30 ENCOUNTER — OUTPATIENT (OUTPATIENT)
Dept: OUTPATIENT SERVICES | Facility: HOSPITAL | Age: 75
LOS: 1 days | End: 2025-07-30
Payer: MEDICARE

## 2025-07-30 DIAGNOSIS — Z98.890 OTHER SPECIFIED POSTPROCEDURAL STATES: Chronic | ICD-10-CM

## 2025-07-30 PROCEDURE — 86900 BLOOD TYPING SEROLOGIC ABO: CPT

## 2025-07-30 PROCEDURE — 86901 BLOOD TYPING SEROLOGIC RH(D): CPT

## 2025-07-30 PROCEDURE — 36430 TRANSFUSION BLD/BLD COMPNT: CPT

## 2025-07-30 PROCEDURE — 86850 RBC ANTIBODY SCREEN: CPT

## 2025-07-31 ENCOUNTER — APPOINTMENT (OUTPATIENT)
Dept: INFUSION THERAPY | Facility: CLINIC | Age: 75
End: 2025-07-31

## 2025-07-31 ENCOUNTER — OUTPATIENT (OUTPATIENT)
Dept: OUTPATIENT SERVICES | Facility: HOSPITAL | Age: 75
LOS: 1 days | End: 2025-07-31
Payer: MEDICARE

## 2025-07-31 VITALS
TEMPERATURE: 98 F | RESPIRATION RATE: 18 BRPM | WEIGHT: 132.94 LBS | HEART RATE: 61 BPM | SYSTOLIC BLOOD PRESSURE: 115 MMHG | HEIGHT: 66 IN | DIASTOLIC BLOOD PRESSURE: 64 MMHG | OXYGEN SATURATION: 99 %

## 2025-07-31 VITALS
TEMPERATURE: 98 F | HEART RATE: 54 BPM | OXYGEN SATURATION: 99 % | SYSTOLIC BLOOD PRESSURE: 153 MMHG | DIASTOLIC BLOOD PRESSURE: 63 MMHG | RESPIRATION RATE: 18 BRPM
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
## 2025-07-31 DIAGNOSIS — Z98.890 OTHER SPECIFIED POSTPROCEDURAL STATES: Chronic | ICD-10-CM

## 2025-07-31 DIAGNOSIS — D75.81 MYELOFIBROSIS: ICD-10-CM

## 2025-07-31 PROCEDURE — 36430 TRANSFUSION BLD/BLD COMPNT: CPT

## 2025-08-01 DIAGNOSIS — M81.0 AGE-RELATED OSTEOPOROSIS WITHOUT CURRENT PATHOLOGICAL FRACTURE: ICD-10-CM

## 2025-08-19 ENCOUNTER — APPOINTMENT (OUTPATIENT)
Dept: HEART AND VASCULAR | Facility: CLINIC | Age: 75
End: 2025-08-19
Payer: MEDICARE

## 2025-08-19 VITALS — SYSTOLIC BLOOD PRESSURE: 138 MMHG | DIASTOLIC BLOOD PRESSURE: 68 MMHG

## 2025-08-19 PROCEDURE — 93306 TTE W/DOPPLER COMPLETE: CPT

## 2025-09-05 ENCOUNTER — APPOINTMENT (OUTPATIENT)
Dept: HEART AND VASCULAR | Facility: CLINIC | Age: 75
End: 2025-09-05

## 2025-09-05 VITALS
TEMPERATURE: 98.4 F | BODY MASS INDEX: 22.16 KG/M2 | DIASTOLIC BLOOD PRESSURE: 64 MMHG | WEIGHT: 132.98 LBS | OXYGEN SATURATION: 99 % | SYSTOLIC BLOOD PRESSURE: 130 MMHG | HEART RATE: 62 BPM | HEIGHT: 65 IN

## 2025-09-05 DIAGNOSIS — I10 ESSENTIAL (PRIMARY) HYPERTENSION: ICD-10-CM

## 2025-09-05 DIAGNOSIS — I48.91 UNSPECIFIED ATRIAL FIBRILLATION: ICD-10-CM

## 2025-09-05 DIAGNOSIS — I25.85 CHRONIC CORONARY MICROVASCULAR DYSFUNCTION: ICD-10-CM

## 2025-09-05 DIAGNOSIS — I25.10 ATHEROSCLEROTIC HEART DISEASE OF NATIVE CORONARY ARTERY W/OUT ANGINA PECTORIS: ICD-10-CM

## 2025-09-05 DIAGNOSIS — E78.5 HYPERLIPIDEMIA, UNSPECIFIED: ICD-10-CM

## 2025-09-05 DIAGNOSIS — I20.1 ANGINA PECTORIS WITH DOCUMENTED SPASM: ICD-10-CM

## 2025-09-05 DIAGNOSIS — I31.39 OTHER PERICARDIAL EFFUSION (NONINFLAMMATORY): ICD-10-CM

## 2025-09-05 DIAGNOSIS — I27.20 PULMONARY HYPERTENSION, UNSPECIFIED: ICD-10-CM

## 2025-09-05 DIAGNOSIS — I50.30 UNSPECIFIED DIASTOLIC (CONGESTIVE) HEART FAILURE: ICD-10-CM

## 2025-09-05 DIAGNOSIS — I48.0 PAROXYSMAL ATRIAL FIBRILLATION: ICD-10-CM

## 2025-09-05 PROCEDURE — 99214 OFFICE O/P EST MOD 30 MIN: CPT

## 2025-09-05 PROCEDURE — 93000 ELECTROCARDIOGRAM COMPLETE: CPT

## 2025-09-05 PROCEDURE — G2211 COMPLEX E/M VISIT ADD ON: CPT
